# Patient Record
Sex: MALE | Race: ASIAN | NOT HISPANIC OR LATINO | Employment: OTHER | URBAN - METROPOLITAN AREA
[De-identification: names, ages, dates, MRNs, and addresses within clinical notes are randomized per-mention and may not be internally consistent; named-entity substitution may affect disease eponyms.]

---

## 2017-02-09 ENCOUNTER — APPOINTMENT (OUTPATIENT)
Dept: INTERVENTIONAL RADIOLOGY/VASCULAR | Facility: CLINIC | Age: 72
End: 2017-02-09
Attending: INTERNAL MEDICINE
Payer: MEDICARE

## 2017-02-09 ENCOUNTER — ANESTHESIA (OUTPATIENT)
Dept: SURGERY | Facility: CLINIC | Age: 72
End: 2017-02-09
Payer: MEDICARE

## 2017-02-09 ENCOUNTER — HOSPITAL ENCOUNTER (OUTPATIENT)
Facility: CLINIC | Age: 72
Setting detail: OBSERVATION
Discharge: HOME | End: 2017-02-10
Attending: EMERGENCY MEDICINE | Admitting: HOSPITALIST
Payer: MEDICARE

## 2017-02-09 ENCOUNTER — APPOINTMENT (OUTPATIENT)
Dept: ULTRASOUND IMAGING | Facility: CLINIC | Age: 72
End: 2017-02-09
Attending: EMERGENCY MEDICINE
Payer: MEDICARE

## 2017-02-09 ENCOUNTER — ANESTHESIA EVENT (OUTPATIENT)
Dept: SURGERY | Facility: CLINIC | Age: 72
End: 2017-02-09
Payer: MEDICARE

## 2017-02-09 DIAGNOSIS — K80.70 CHOLELITHIASIS WITH CHOLEDOCHOLITHIASIS: ICD-10-CM

## 2017-02-09 PROBLEM — K80.20 CHOLELITHIASIS: Status: ACTIVE | Noted: 2017-02-09

## 2017-02-09 LAB
ALBUMIN SERPL-MCNC: 3.6 G/DL (ref 3.4–5)
ALP SERPL-CCNC: 170 U/L (ref 40–150)
ALT SERPL W P-5'-P-CCNC: 1120 U/L (ref 0–70)
ANION GAP SERPL CALCULATED.3IONS-SCNC: 11 MMOL/L (ref 3–14)
AST SERPL W P-5'-P-CCNC: 993 U/L (ref 0–45)
BASOPHILS # BLD AUTO: 0 10E9/L (ref 0–0.2)
BASOPHILS NFR BLD AUTO: 0.4 %
BILIRUB SERPL-MCNC: 1.3 MG/DL (ref 0.2–1.3)
BUN SERPL-MCNC: 15 MG/DL (ref 7–30)
CALCIUM SERPL-MCNC: 8.3 MG/DL (ref 8.5–10.1)
CHLORIDE SERPL-SCNC: 102 MMOL/L (ref 94–109)
CO2 SERPL-SCNC: 24 MMOL/L (ref 20–32)
CREAT SERPL-MCNC: 0.7 MG/DL (ref 0.66–1.25)
DIFFERENTIAL METHOD BLD: NORMAL
EOSINOPHIL # BLD AUTO: 0.1 10E9/L (ref 0–0.7)
EOSINOPHIL NFR BLD AUTO: 1.8 %
ERCP: NORMAL
ERYTHROCYTE [DISTWIDTH] IN BLOOD BY AUTOMATED COUNT: 12.9 % (ref 10–15)
GFR SERPL CREATININE-BSD FRML MDRD: ABNORMAL ML/MIN/1.7M2
GLUCOSE BLDC GLUCOMTR-MCNC: 113 MG/DL (ref 70–99)
GLUCOSE BLDC GLUCOMTR-MCNC: 127 MG/DL (ref 70–99)
GLUCOSE SERPL-MCNC: 123 MG/DL (ref 70–99)
HCT VFR BLD AUTO: 42.5 % (ref 40–53)
HGB BLD-MCNC: 14.5 G/DL (ref 13.3–17.7)
IMM GRANULOCYTES # BLD: 0 10E9/L (ref 0–0.4)
IMM GRANULOCYTES NFR BLD: 0.4 %
INR PPP: 0.94 (ref 0.86–1.14)
LIPASE SERPL-CCNC: 206 U/L (ref 73–393)
LYMPHOCYTES # BLD AUTO: 1 10E9/L (ref 0.8–5.3)
LYMPHOCYTES NFR BLD AUTO: 19.9 %
MCH RBC QN AUTO: 30.1 PG (ref 26.5–33)
MCHC RBC AUTO-ENTMCNC: 34.1 G/DL (ref 31.5–36.5)
MCV RBC AUTO: 88 FL (ref 78–100)
MONOCYTES # BLD AUTO: 0.4 10E9/L (ref 0–1.3)
MONOCYTES NFR BLD AUTO: 8.6 %
NEUTROPHILS # BLD AUTO: 3.5 10E9/L (ref 1.6–8.3)
NEUTROPHILS NFR BLD AUTO: 68.9 %
NRBC # BLD AUTO: 0 10*3/UL
NRBC BLD AUTO-RTO: 0 /100
PLATELET # BLD AUTO: 250 10E9/L (ref 150–450)
POTASSIUM SERPL-SCNC: 3.9 MMOL/L (ref 3.4–5.3)
PROT SERPL-MCNC: 6.9 G/DL (ref 6.8–8.8)
RBC # BLD AUTO: 4.82 10E12/L (ref 4.4–5.9)
SODIUM SERPL-SCNC: 137 MMOL/L (ref 133–144)
TROPONIN I SERPL-MCNC: NORMAL UG/L (ref 0–0.04)
UPPER EUS: NORMAL
WBC # BLD AUTO: 5 10E9/L (ref 4–11)

## 2017-02-09 PROCEDURE — 27210995 ZZH RX 272: Performed by: INTERNAL MEDICINE

## 2017-02-09 PROCEDURE — 25000132 ZZH RX MED GY IP 250 OP 250 PS 637: Mod: GY | Performed by: PHYSICIAN ASSISTANT

## 2017-02-09 PROCEDURE — 25000128 H RX IP 250 OP 636: Performed by: PHYSICIAN ASSISTANT

## 2017-02-09 PROCEDURE — 40000306 ZZH STATISTIC PRE PROC ASSESS II: Performed by: INTERNAL MEDICINE

## 2017-02-09 PROCEDURE — C1726 CATH, BAL DIL, NON-VASCULAR: HCPCS | Performed by: INTERNAL MEDICINE

## 2017-02-09 PROCEDURE — 36000058 ZZH SURGERY LEVEL 3 EA 15 ADDTL MIN: Performed by: INTERNAL MEDICINE

## 2017-02-09 PROCEDURE — 25500064 ZZH RX 255 OP 636: Performed by: INTERNAL MEDICINE

## 2017-02-09 PROCEDURE — 93005 ELECTROCARDIOGRAM TRACING: CPT

## 2017-02-09 PROCEDURE — 83690 ASSAY OF LIPASE: CPT | Performed by: EMERGENCY MEDICINE

## 2017-02-09 PROCEDURE — 36000060 ZZH SURGERY LEVEL 3 W FLUORO 1ST 30 MIN: Performed by: INTERNAL MEDICINE

## 2017-02-09 PROCEDURE — 25000125 ZZHC RX 250: Performed by: EMERGENCY MEDICINE

## 2017-02-09 PROCEDURE — 36415 COLL VENOUS BLD VENIPUNCTURE: CPT | Performed by: EMERGENCY MEDICINE

## 2017-02-09 PROCEDURE — 25000125 ZZHC RX 250: Performed by: NURSE ANESTHETIST, CERTIFIED REGISTERED

## 2017-02-09 PROCEDURE — 37000009 ZZH ANESTHESIA TECHNICAL FEE, EACH ADDTL 15 MIN: Performed by: INTERNAL MEDICINE

## 2017-02-09 PROCEDURE — 99235 HOSP IP/OBS SAME DATE MOD 70: CPT | Performed by: PHYSICIAN ASSISTANT

## 2017-02-09 PROCEDURE — 40000935 ZZH STATISTIC OUTPATIENT (NON-OBS) EVE

## 2017-02-09 PROCEDURE — 40000063 ZZH STATISTIC EGD (OR PROCEDURE): Performed by: INTERNAL MEDICINE

## 2017-02-09 PROCEDURE — 80053 COMPREHEN METABOLIC PANEL: CPT | Performed by: EMERGENCY MEDICINE

## 2017-02-09 PROCEDURE — 84484 ASSAY OF TROPONIN QUANT: CPT | Performed by: EMERGENCY MEDICINE

## 2017-02-09 PROCEDURE — 85610 PROTHROMBIN TIME: CPT | Performed by: EMERGENCY MEDICINE

## 2017-02-09 PROCEDURE — G0378 HOSPITAL OBSERVATION PER HR: HCPCS

## 2017-02-09 PROCEDURE — 85025 COMPLETE CBC W/AUTO DIFF WBC: CPT | Performed by: EMERGENCY MEDICINE

## 2017-02-09 PROCEDURE — 76705 ECHO EXAM OF ABDOMEN: CPT

## 2017-02-09 PROCEDURE — 71000012 ZZH RECOVERY PHASE 1 LEVEL 1 FIRST HR: Performed by: INTERNAL MEDICINE

## 2017-02-09 PROCEDURE — 25000132 ZZH RX MED GY IP 250 OP 250 PS 637: Performed by: EMERGENCY MEDICINE

## 2017-02-09 PROCEDURE — 25000566 ZZH SEVOFLURANE, EA 15 MIN: Performed by: INTERNAL MEDICINE

## 2017-02-09 PROCEDURE — 27210794 ZZH OR GENERAL SUPPLY STERILE: Performed by: INTERNAL MEDICINE

## 2017-02-09 PROCEDURE — 25000128 H RX IP 250 OP 636: Performed by: NURSE ANESTHETIST, CERTIFIED REGISTERED

## 2017-02-09 PROCEDURE — C1769 GUIDE WIRE: HCPCS | Performed by: INTERNAL MEDICINE

## 2017-02-09 PROCEDURE — 25000125 ZZHC RX 250: Performed by: PHYSICIAN ASSISTANT

## 2017-02-09 PROCEDURE — 00000146 ZZHCL STATISTIC GLUCOSE BY METER IP

## 2017-02-09 PROCEDURE — 40000067 ZZH STATISTIC ERCP (OR PROCEDURE): Performed by: INTERNAL MEDICINE

## 2017-02-09 PROCEDURE — 37000008 ZZH ANESTHESIA TECHNICAL FEE, 1ST 30 MIN: Performed by: INTERNAL MEDICINE

## 2017-02-09 PROCEDURE — 25800025 ZZH RX 258: Performed by: ANESTHESIOLOGY

## 2017-02-09 PROCEDURE — 99285 EMERGENCY DEPT VISIT HI MDM: CPT | Mod: 25

## 2017-02-09 PROCEDURE — 40000799 ZZH STATISTIC EUS (OR PROCEDURE): Performed by: INTERNAL MEDICINE

## 2017-02-09 RX ORDER — ONDANSETRON 4 MG/1
4 TABLET, ORALLY DISINTEGRATING ORAL EVERY 30 MIN PRN
Status: DISCONTINUED | OUTPATIENT
Start: 2017-02-09 | End: 2017-02-09 | Stop reason: HOSPADM

## 2017-02-09 RX ORDER — LABETALOL HYDROCHLORIDE 5 MG/ML
10 INJECTION, SOLUTION INTRAVENOUS
Status: DISCONTINUED | OUTPATIENT
Start: 2017-02-09 | End: 2017-02-09 | Stop reason: HOSPADM

## 2017-02-09 RX ORDER — EPHEDRINE SULFATE 50 MG/ML
INJECTION, SOLUTION INTRAVENOUS PRN
Status: DISCONTINUED | OUTPATIENT
Start: 2017-02-09 | End: 2017-02-09

## 2017-02-09 RX ORDER — FENTANYL CITRATE 50 UG/ML
INJECTION, SOLUTION INTRAMUSCULAR; INTRAVENOUS PRN
Status: DISCONTINUED | OUTPATIENT
Start: 2017-02-09 | End: 2017-02-09

## 2017-02-09 RX ORDER — OXYCODONE HYDROCHLORIDE 5 MG/1
5-10 TABLET ORAL
Status: DISCONTINUED | OUTPATIENT
Start: 2017-02-09 | End: 2017-02-10 | Stop reason: HOSPADM

## 2017-02-09 RX ORDER — LIDOCAINE 40 MG/G
CREAM TOPICAL
Status: DISCONTINUED | OUTPATIENT
Start: 2017-02-09 | End: 2017-02-09 | Stop reason: HOSPADM

## 2017-02-09 RX ORDER — AMOXICILLIN 250 MG
1-2 CAPSULE ORAL 2 TIMES DAILY PRN
Status: DISCONTINUED | OUTPATIENT
Start: 2017-02-09 | End: 2017-02-10 | Stop reason: HOSPADM

## 2017-02-09 RX ORDER — PROPOFOL 10 MG/ML
INJECTION, EMULSION INTRAVENOUS PRN
Status: DISCONTINUED | OUTPATIENT
Start: 2017-02-09 | End: 2017-02-09

## 2017-02-09 RX ORDER — PROCHLORPERAZINE 25 MG
12.5 SUPPOSITORY, RECTAL RECTAL EVERY 12 HOURS PRN
Status: DISCONTINUED | OUTPATIENT
Start: 2017-02-09 | End: 2017-02-10 | Stop reason: HOSPADM

## 2017-02-09 RX ORDER — INDOMETHACIN 50 MG/1
100 SUPPOSITORY RECTAL
Status: COMPLETED | OUTPATIENT
Start: 2017-02-09 | End: 2017-02-09

## 2017-02-09 RX ORDER — HYDROMORPHONE HCL/0.9% NACL/PF 0.2MG/0.2
0.2 SYRINGE (ML) INTRAVENOUS
Status: DISCONTINUED | OUTPATIENT
Start: 2017-02-09 | End: 2017-02-10 | Stop reason: HOSPADM

## 2017-02-09 RX ORDER — PROCHLORPERAZINE MALEATE 5 MG
5 TABLET ORAL EVERY 6 HOURS PRN
Status: DISCONTINUED | OUTPATIENT
Start: 2017-02-09 | End: 2017-02-10 | Stop reason: HOSPADM

## 2017-02-09 RX ORDER — FENTANYL CITRATE 50 UG/ML
25-50 INJECTION, SOLUTION INTRAMUSCULAR; INTRAVENOUS
Status: DISCONTINUED | OUTPATIENT
Start: 2017-02-09 | End: 2017-02-09 | Stop reason: HOSPADM

## 2017-02-09 RX ORDER — ONDANSETRON 4 MG/1
4 TABLET, ORALLY DISINTEGRATING ORAL EVERY 6 HOURS PRN
Status: DISCONTINUED | OUTPATIENT
Start: 2017-02-09 | End: 2017-02-10 | Stop reason: HOSPADM

## 2017-02-09 RX ORDER — ONDANSETRON 2 MG/ML
4 INJECTION INTRAMUSCULAR; INTRAVENOUS EVERY 6 HOURS PRN
Status: DISCONTINUED | OUTPATIENT
Start: 2017-02-09 | End: 2017-02-10 | Stop reason: HOSPADM

## 2017-02-09 RX ORDER — SODIUM CHLORIDE, SODIUM LACTATE, POTASSIUM CHLORIDE, CALCIUM CHLORIDE 600; 310; 30; 20 MG/100ML; MG/100ML; MG/100ML; MG/100ML
INJECTION, SOLUTION INTRAVENOUS CONTINUOUS
Status: DISCONTINUED | OUTPATIENT
Start: 2017-02-09 | End: 2017-02-09 | Stop reason: HOSPADM

## 2017-02-09 RX ORDER — SODIUM CHLORIDE 9 MG/ML
INJECTION, SOLUTION INTRAVENOUS CONTINUOUS
Status: DISCONTINUED | OUTPATIENT
Start: 2017-02-09 | End: 2017-02-10 | Stop reason: HOSPADM

## 2017-02-09 RX ORDER — NALOXONE HYDROCHLORIDE 0.4 MG/ML
.1-.4 INJECTION, SOLUTION INTRAMUSCULAR; INTRAVENOUS; SUBCUTANEOUS
Status: DISCONTINUED | OUTPATIENT
Start: 2017-02-09 | End: 2017-02-10 | Stop reason: HOSPADM

## 2017-02-09 RX ORDER — HYDRALAZINE HYDROCHLORIDE 20 MG/ML
2.5-5 INJECTION INTRAMUSCULAR; INTRAVENOUS EVERY 10 MIN PRN
Status: DISCONTINUED | OUTPATIENT
Start: 2017-02-09 | End: 2017-02-09 | Stop reason: HOSPADM

## 2017-02-09 RX ORDER — CEFTAZIDIME 1 G/1
1 INJECTION, POWDER, FOR SOLUTION INTRAMUSCULAR; INTRAVENOUS
Status: COMPLETED | OUTPATIENT
Start: 2017-02-09 | End: 2017-02-09

## 2017-02-09 RX ORDER — GLYCOPYRROLATE 0.2 MG/ML
INJECTION, SOLUTION INTRAMUSCULAR; INTRAVENOUS PRN
Status: DISCONTINUED | OUTPATIENT
Start: 2017-02-09 | End: 2017-02-09

## 2017-02-09 RX ORDER — LIDOCAINE 40 MG/G
CREAM TOPICAL
Status: DISCONTINUED | OUTPATIENT
Start: 2017-02-09 | End: 2017-02-10 | Stop reason: HOSPADM

## 2017-02-09 RX ORDER — INDOMETHACIN 50 MG/1
100 SUPPOSITORY RECTAL
Status: DISCONTINUED | OUTPATIENT
Start: 2017-02-09 | End: 2017-02-09 | Stop reason: HOSPADM

## 2017-02-09 RX ORDER — NEOSTIGMINE METHYLSULFATE 1 MG/ML
VIAL (ML) INJECTION PRN
Status: DISCONTINUED | OUTPATIENT
Start: 2017-02-09 | End: 2017-02-09

## 2017-02-09 RX ORDER — ONDANSETRON 2 MG/ML
4 INJECTION INTRAMUSCULAR; INTRAVENOUS EVERY 30 MIN PRN
Status: DISCONTINUED | OUTPATIENT
Start: 2017-02-09 | End: 2017-02-09 | Stop reason: HOSPADM

## 2017-02-09 RX ORDER — MULTIVITAMIN,THERAPEUTIC
1 TABLET ORAL EVERY EVENING
COMMUNITY

## 2017-02-09 RX ORDER — DIMENHYDRINATE 50 MG/ML
25 INJECTION, SOLUTION INTRAMUSCULAR; INTRAVENOUS
Status: DISCONTINUED | OUTPATIENT
Start: 2017-02-09 | End: 2017-02-09 | Stop reason: HOSPADM

## 2017-02-09 RX ORDER — POLYETHYLENE GLYCOL 3350 17 G/17G
17 POWDER, FOR SOLUTION ORAL DAILY PRN
Status: DISCONTINUED | OUTPATIENT
Start: 2017-02-09 | End: 2017-02-10 | Stop reason: HOSPADM

## 2017-02-09 RX ORDER — HYDROMORPHONE HYDROCHLORIDE 1 MG/ML
.3-.5 INJECTION, SOLUTION INTRAMUSCULAR; INTRAVENOUS; SUBCUTANEOUS EVERY 5 MIN PRN
Status: DISCONTINUED | OUTPATIENT
Start: 2017-02-09 | End: 2017-02-09 | Stop reason: HOSPADM

## 2017-02-09 RX ORDER — DEXAMETHASONE SODIUM PHOSPHATE 4 MG/ML
INJECTION, SOLUTION INTRA-ARTICULAR; INTRALESIONAL; INTRAMUSCULAR; INTRAVENOUS; SOFT TISSUE PRN
Status: DISCONTINUED | OUTPATIENT
Start: 2017-02-09 | End: 2017-02-09

## 2017-02-09 RX ORDER — METFORMIN HCL 500 MG
1000 TABLET, EXTENDED RELEASE 24 HR ORAL
COMMUNITY

## 2017-02-09 RX ADMIN — MIDAZOLAM HYDROCHLORIDE 2 MG: 1 INJECTION, SOLUTION INTRAMUSCULAR; INTRAVENOUS at 16:00

## 2017-02-09 RX ADMIN — ONDANSETRON 4 MG: 2 INJECTION INTRAMUSCULAR; INTRAVENOUS at 16:08

## 2017-02-09 RX ADMIN — GLYCOPYRROLATE 0.4 MG: 0.2 INJECTION, SOLUTION INTRAMUSCULAR; INTRAVENOUS at 16:45

## 2017-02-09 RX ADMIN — CEFTAZIDIME 1 G: 1 INJECTION, POWDER, FOR SOLUTION INTRAMUSCULAR; INTRAVENOUS at 16:18

## 2017-02-09 RX ADMIN — GLYCOPYRROLATE 0.2 MG: 0.2 INJECTION, SOLUTION INTRAMUSCULAR; INTRAVENOUS at 16:08

## 2017-02-09 RX ADMIN — ROCURONIUM BROMIDE 35 MG: 10 INJECTION INTRAVENOUS at 16:08

## 2017-02-09 RX ADMIN — SODIUM CHLORIDE, POTASSIUM CHLORIDE, SODIUM LACTATE AND CALCIUM CHLORIDE: 600; 310; 30; 20 INJECTION, SOLUTION INTRAVENOUS at 16:00

## 2017-02-09 RX ADMIN — DEXAMETHASONE SODIUM PHOSPHATE 4 MG: 4 INJECTION, SOLUTION INTRAMUSCULAR; INTRAVENOUS at 16:08

## 2017-02-09 RX ADMIN — SODIUM CHLORIDE: 9 INJECTION, SOLUTION INTRAVENOUS at 18:54

## 2017-02-09 RX ADMIN — FENTANYL CITRATE 100 MCG: 50 INJECTION, SOLUTION INTRAMUSCULAR; INTRAVENOUS at 16:08

## 2017-02-09 RX ADMIN — PROPOFOL 200 MG: 10 INJECTION, EMULSION INTRAVENOUS at 16:08

## 2017-02-09 RX ADMIN — EPHEDRINE SULFATE 5 MG: 50 INJECTION, SOLUTION INTRAMUSCULAR; INTRAVENOUS; SUBCUTANEOUS at 16:28

## 2017-02-09 RX ADMIN — Medication 3.5 MG: at 16:45

## 2017-02-09 RX ADMIN — Medication 30 MG: at 16:08

## 2017-02-09 RX ADMIN — LIDOCAINE HYDROCHLORIDE 30 ML: 20 SOLUTION ORAL; TOPICAL at 10:19

## 2017-02-09 RX ADMIN — FENTANYL CITRATE 50 MCG: 50 INJECTION, SOLUTION INTRAMUSCULAR; INTRAVENOUS at 16:36

## 2017-02-09 ASSESSMENT — ENCOUNTER SYMPTOMS
ABDOMINAL PAIN: 1
FEVER: 0
BLOOD IN STOOL: 0
DYSRHYTHMIAS: 0
SHORTNESS OF BREATH: 0
STRIDOR: 0
SEIZURES: 0
NAUSEA: 0
DIARRHEA: 0
VOMITING: 0

## 2017-02-09 ASSESSMENT — COPD QUESTIONNAIRES: COPD: 0

## 2017-02-09 ASSESSMENT — LIFESTYLE VARIABLES: TOBACCO_USE: 0

## 2017-02-09 NOTE — ED NOTES
Pt presents c/o epigastric pain on and off since eating a large meal on 1/29. Pt states pain presents after eating. Pt is having pain at this time 7/10. Pt is A&O, ABC's intact.

## 2017-02-09 NOTE — CONSULTS
GASTROENTEROLOGY CONSULTATION      Krista Antonio  [address] [facility] [address]  71 year old male     Admission Date/Time: 2/9/2017  Primary Care Provider: [facility], [place]     We were asked to see the patient in consultation by Dr. Layne for evaluation of epigastric pain, elevated LFTs.    CC: epigastric pain     HPI:  Krista Antonio is a 71 year old male with past medical history significant for diabetes and hyperlipidemia presenting to the hospital with episodic postprandial epigastric abdominal pain starting 4 days ago. He reports eating a hamburger with a beer 4 days ago at a [employer] Bowl party and approximately an hour later developed sharp epigastric pain, nonradiating. The pain did improve but subsequently every time he at, the pain would recur and become more intense. He took an aspirin to see if this would help but no change. He denies any fevers, chills, heartburn, reflux, nausea, vomiting, or changes in his stools. Due to the pain, he presented to the hospital this morning. Labs show elevated transaminases with normal bilirubin. RUQ US shows gall bladder wall thickening, gall stones, and prominent common bile duct (normal for age).     , ALT 1120, Alk phos 170. lipase, WBC, and total bilirubin normal. He consumes alcohol rarely. He is a nonsmoker. He takes metformin for the last year, and Crestor for the past 5 years. He also takes CoQ10 supplement.      PAST MEDICAL HISTORY:  Diabetes  Hyperlipidemia     ROS: A comprehensive ten point review of systems was negative aside from those in mentioned in the HPI.       MEDICATIONS:   Metformin  Crestor     ALLERGIES:   Allergies   Allergen Reactions     Sulfa Drugs         SOCIAL HISTORY:  Social History   Substance Use Topics     Smoking status: Not on file     Smokeless tobacco: Not on file     Alcohol Use: Not on file        FAMILY HISTORY:  Denies family history of gall bladder disease or liver issues.      PHYSICAL  "EXAM:   /82 mmHg  Pulse 82  Temp(Src) 97.7  F (36.5  C) (Oral)  Resp 18  Ht 1.753 m (5' 9\")  Wt 78.472 kg (173 lb)  BMI 25.54 kg/m2  SpO2 97%     PHYSICAL EXAM:  General: alert, oriented, NAD  SKIN: no suspicious lesions, rashes, jaundice, or spider angiomas  HEAD: Normocephalic. No masses, lesions, tenderness or abnormalities  NECK: Neck supple. No adenopathy. Thyroid symmetric, normal size.  EYES: No scleral icterus  ENT: ENT exam normal, no neck nodes or sinus tenderness  RESPIRATORY: negative, Good diaphragmatic excursion. Lungs clear  CARDIOVASCULAR: negative, PMI normal. No lifts, heaves, or thrills. RRR. No murmurs, clicks gallops or rub  GASTROINTESTINAL: +BS, soft, minimal epigastric tenderness, ND, no HSM, no masses/guarding/rebound  JOINT/EXTREMITIES: extremities normal- no gross deformities noted, gait normal and normal muscle tone  NEURO: Reflexes grossly normal and symmetric. Sensation grossly WNL.  PSYCH: no abnormal anxiety/depression  LYMPH: No anterior cervical, posterior cervical, or supraclavicular adenopathy     LABS:  I reviewed the patient's new clinical lab test results.   Recent Labs   Lab Test  02/09/17 0927   WBC  5.0   HGB  14.5   MCV  88   PLT  250     Recent Labs   Lab Test  02/09/17 0927   NA  137   POTASSIUM  3.9   CHLORIDE  102   CO2  24   BUN  15   ANIONGAP  11   NANCI  8.3*     Recent Labs   Lab Test  02/09/17 0927   ALBUMIN  3.6   BILITOTAL  1.3   ALT  1120*   AST  993*   ALKPHOS  170*   LIPASE  206        IMAGING  I personally reviewed the patient's new imaging results.    RUQ US:  FINDINGS: Multiple small mobile echogenic foci are noted within the  gallbladder which may represent nonshadowing stones. The gallbladder  wall was mildly thickened measuring 0.4 cm. The common bile duct was  prominent but likely normal for age. Increased hepatic echotexture  suggests fatty infiltration. The right kidney and visualized portion  of the pancreas appear within normal " limits.                                                                       IMPRESSION: Multiple small gallstones. Nonspecific gallbladder wall  thickening. Probable hepatic fatty infiltration.     CONSULTATION ASSESSMENT AND PLAN:    71 year old male presenting with episodic postprandial epigastric pain starting 4 days ago with workup consistent with biliary colic and mild cholecystitis with concern for possible obstructing stone. Given how elevated transaminases are at this point, prefer EUS to exclude CBD stone. EUS, possible ERCP today. INR ordered. Continue NPO. Orders placed.       Thank you for asking us to participate in the care of this patient.    Annetta Evans PA-C  [facility]

## 2017-02-09 NOTE — ED PROVIDER NOTES
"Chief Complaint:  Abdominal pain   History of Present Illness   Krista Antonio is a 71 year old male with a history of hypertension, diabetes and hyperlipidemia who presents to the emergency department for evaluation of abdominal pain. The patient reports that four days ago shortly after eating dinner, he developed pain to his \"solar plexus\" region. He describes the pain as \"someone punching [him] in the stomach\".  It is non-radiating. He initially thought this was indigestion but states that since that time, he experiences similar epigastric pains 30 minutes after eating. He states that he had the most severe episode of pain yesterday after lunch that has lasted through the night and into this morning. He experiences some relief when lying down. He denies any radiation of pain and has taken aspirin without significant change. He has not experienced this pain while exercising. His daughter-in-law, a hospitalist, encouraged him to be evaluated for cardiac vs gallbladder pathology. He rates his current pain at 6-7/10. He denies any fever, nausea, vomiting, black/bloody stool, sore throat, shortness of breath or known trauma/injury. The patient voices no further concerns at this time.    Cardiac Risk Factors:  SEX:              male  Tobacco:              Negative  Hypertension:       Positive  Diabetes:             Positive  Lipids:                Positive  Personal history:  Negative  Family History:       Negative    Allergies:  Sulfa Drugs    Medications:  Baby aspirin    Medical History:  Diabetes  Hypertension   Hyperlipidemia    Surgical History:  Hip surgery    Family History:  History reviewed. No pertinent family history.    Social History:  The patient presents to the emergency department alone, brought in by daughter-in-law.   Tobacco use: No  Alcohol use: Yes  Marital Status:   PCP: No primary care provider on file.     Review of Systems   Constitutional: Negative for fever.   Respiratory: " "Negative for shortness of breath.    Gastrointestinal: Positive for abdominal pain. Negative for nausea, vomiting, diarrhea and blood in stool.   All other systems reviewed and are negative.    First Vitals:  Patient Vitals for the past 24 hrs:   BP Temp Temp src Pulse Heart Rate Resp SpO2 Height Weight   02/09/17 1030 - - - - 74 - 96 % - -   02/09/17 1000 143/87 mmHg - - - 83 - - - -   02/09/17 0850 151/82 mmHg 97.7  F (36.5  C) Oral 82 - 18 97 % 1.753 m (5' 9\") 78.472 kg (173 lb)     Physical Exam  Gen: Pleasant, appears stated age.  Minimal discomfort.    Eye:   Pupils are equal, round, and reactive.     Sclera non-injected.    ENT:   Moist mucus membranes.     Normal tongue.    Oropharynx without lesions.    Cardiac:     Normal rate, occasional PVC's.   No murmurs, gallops, or rubs.    Pulmonary:     Clear to auscultation bilaterally.    No wheezes, rales, or rhonchi.    Abdomen:     Normal active bowel sounds.     Abdomen is soft and non-distended.    Mild epigastric abdominal tenderness, negative Hendrickson's sign.    Musculoskeletal:     Normal movement of all extremities without evidence for deficit.    Extremities:    No edema.    Skin:   Warm and dry.    Neurologic:    Non-focal exam without asymmetric weakness or numbness.    Normal tone    Psychiatric:     Normal affect with appropriate interaction with examiner.     Emergency Department Course   Laboratory:  CBC: WNL (WBC - 5.0, HGB - 14.5, PLT - 250)  CMP: Glucose - 123 (H), Ca - 8.3 (L), Alkphos - 170 (H), ALT - 1120 (HH), AST - 993 (HH) o/w WNL (Creat - 0.70)  Lipase: 206  Troponin: <0.015    EKG:  Indication: epigastric pain  Time: 08:46  Vent. Rate 84 bpm. AR interval 176. QRS duration 88. QT/QTc 378/446. P-R-T axis 27 37 26. Sinus rhythm with premature atrial complexes with aberrant conduction. Otherwise normal ECG. Read time: 1000. Agree.     Imaging:  US Abdomen Limited: Multiple small mobile echogenic foci are noted within the  gallbladder which " may represent nonshadowing stones. The gallbladder  wall was mildly thickened measuring 0.4 cm. The common bile duct was  prominent but likely normal for age. Increased hepatic echotexture  suggests fatty infiltration. The right kidney and visualized portion  of the pancreas appear within normal limits. Report per radiology     Interventions:  1019 Oral GI Cocktail    Medications   lidocaine (XYLOCAINE) 2 % 15 mL, alum & mag hydroxide-simethicone (MYLANTA ES/MAALOX  ES) 15 mL GI Cocktail (30 mLs Oral Given 2/9/17 1019)       Emergency Department Course Summary:  I reviewed the patient's medical history, charts, vitals, and nursing notes. I examined the patient and discussed the plan of care.    EKG obtained in the ED, see results above.     Blood drawn. This was sent to the lab for further testing, results above.    The patient was sent for an abdominal ultrasound while in the emergency department, findings above.     1033 I discussed the patient with Dr. Roblero, CHANEL. He will try to arrange for EUS.    1036 I reevaluated the patient and provided an update in regards to his ED course.  He declines any pain medications.    11:42   I discussed the case with Dr. Aaron, hospitalist.    12:31 PM  Dr. Roblero confirmed that the patient is scheduled for an endoscopic ultrasound and possible ERCP.    Findings and plan explained to patient. I spoke with Dr. Roblero, from , regarding the patient's case. They agree to accept the patient for transfer and admission to an Adult med/surg bed for further monitoring, evaluation, and treatment. The patient was made aware of this plan, consented, and all questions were answered prior to transfer.    Medical Decision Making   Impression and Plan:  Krista Antonio is a 71 year old male with a history of diabetes, hypertension and hyperlipidemia who presents with intermittent, now constant, epigastric abdominal pain. On exam, the patient has a benign abdominal but mild epigastric  tenderness. He has normal vitals. Labs were notable to markedly elevated ALT and AST. Right upper quadrant ultrasound demonstrates a borderline normal common duct but multiple gallstones. I discussed the case with Dr. Roblero, who evaluated the patient in the ED. He is arranging for an endoscopic ultrasound and possibly ERCP. The patient has been updated and agrees with this plan. His pain is currently controlled and he will remain NPO while in the ED. He will be admitted to the observation unit pending the above interventions.     Diagnosis:    ICD-10-CM    1. Cholelithiasis with choledocholithiasis K80.70       Disposition:   Discharge    Sonia ALLEN, am serving as a scribe at 9:14 AM on 2/9/2017 to document services personally performed by Indu Layne MD, based on my observations and the provider's statements to me.     [facility] EMERGENCY DEPARTMENT  [employer]      Indu Layne MD  02/09/17 [id number]

## 2017-02-09 NOTE — PHARMACY-ADMISSION MEDICATION HISTORY
Admission medication history interview status for this patient is complete. See [facility] admission navigator for allergy information, prior to admission medications and immunization status.     Medication history interview source(s):Patient and Family  Medication history resources (including written lists, pill bottles, clinic record):None  Primary pharmacy:[facility]    Changes made to [facility] medication list:  Added: all  Deleted: none  Changed: none    Actions taken by pharmacist (provider contacted, etc):None     Additional medication history information:None    Medication reconciliation/reorder completed by provider prior to medication history? No    For patients on insulin therapy: No (Yes/No)    Time spent in this activity: 10 min    Prior to Admission medications    Medication Sig Last Dose Taking? Auth Provider   ASPIRIN EC PO Take 81 mg by mouth every evening  2/8/2017 at pm Yes Unknown, Entered By History   metFORMIN (GLUCOPHAGE-XR) 500 MG 24 hr tablet Take 1,000 mg by mouth daily (with dinner)  2/8/2017 at pm Yes Unknown, Entered By History   Rosuvastatin Calcium (CRESTOR PO) Take 10 mg by mouth every evening  2/8/2017 at pm Yes Unknown, Entered By History   Coenzyme Q10 (COQ10 PO) Take 1 tablet by mouth every evening  2/8/2017 at pm Yes Unknown, Entered By History   multivitamin, therapeutic (THERA-VIT) TABS tablet Take 1 tablet by mouth every evening  2/8/2017 at pm Yes Unknown, Entered By History

## 2017-02-09 NOTE — ANESTHESIA CARE TRANSFER NOTE
Patient: Krista Antonio    Procedure(s):  COMBINED ENDOSCOPIC ULTRASOUND, ESOPHAGOSCOPY, GASTROSCOPY, DUODENOSCOPY (EGD)  POSSIBLE ENDOSCOPIC RETROGRADE CHOLANGIOPANCREATOGRAM  - Wound Class: II-Clean Contaminated   - Wound Class: II-Clean Contaminated    Diagnosis: unknown  Diagnosis Additional Information: No value filed.    Anesthesia Type:   General, ETT     Note:  Airway :Face Mask  Patient transferred to:PACU  Comments: To PACU, adequate gas exchange, report to RN.      Vitals: (Last set prior to Anesthesia Care Transfer)    CRNA VITALS  2/9/2017 1625 - 2/9/2017 1704      2/9/2017             Pulse: 78    SpO2: 99 %    Resp Rate (observed): (!) 5                Electronically Signed By: REANNA Berrios CRNA  February 9, 2017  5:04 PM

## 2017-02-09 NOTE — ANESTHESIA POSTPROCEDURE EVALUATION
Patient: Krista Antonio    Procedure(s):  COMBINED ENDOSCOPIC ULTRASOUND, ESOPHAGOSCOPY, GASTROSCOPY, DUODENOSCOPY (EGD)  POSSIBLE ENDOSCOPIC RETROGRADE CHOLANGIOPANCREATOGRAM  - Wound Class: II-Clean Contaminated   - Wound Class: II-Clean Contaminated    Diagnosis:unknown  Diagnosis Additional Information: choledocholithiasis    Anesthesia Type:  General, ETT    Note:  Anesthesia Post Evaluation    Patient location during evaluation: PACU  Patient participation: Able to fully participate in evaluation  Level of consciousness: awake  Pain management: adequate  Airway patency: patent  Cardiovascular status: acceptable  Respiratory status: acceptable  Hydration status: acceptable  PONV: controlled     Anesthetic complications: None          Last vitals:  Filed Vitals:    02/09/17 1700 02/09/17 1705 02/09/17 1710   BP: 165/116  145/78   Pulse:      Temp: 97.2  F (36.2  C)     Resp: 18 11 10   SpO2: 100% 100% 99%         Electronically Signed By: David Coleman MD  February 9, 2017  5:28 PM

## 2017-02-09 NOTE — H&P
History and Physical     Krista Antonio MRN# [id number]   YOB: 1945 Age: 71 year old      Date of Admission:  2/9/2017    Primary care provider: [facility], [place]          Assessment and Plan:   Krista Antonio is a 71 year old male with a PMH significant for diabetes, HLD, who presents with postprandial epigastric pain.    1. Epigastric pain in setting of cholelithiasis: Seen in ED by GI, episodic postprandial epigastric pain and ED workup c/w biliary colic and mild cholecystitis, possible obstructing stone.  Marked elevated transaminases and RUQ u/s demonstrating mildly thickened gallbladder with multiple stones.  -NPO w/ IVF  -Will place formal GI consult to follow during admission  -EUS to exclude CBD stone.   -Likely ERCP today following EUS.  -Likely cholecystectomy tomorrow.  2. Diabetes mellitus: Will hold metformin 1000 mg today while NPO. Will have on SSI.  3. HLD: Hold rosuvastatin 10 mg while NPO, to resume following procedures.    Prophylaxis - PCDs, encourage ambulation every shift.  Code status - Full Code.  Dispo - Anticipate < 2 evening stay.              Chief Complaint:   Abdominal pain         History of Present Illness:   Krista Antonio is a 71 year old male with PMH significant for diabetes, HLD presenting to the hospital with episodic postprandial epigastric abdominal pain.  His episodes started 4 days ago. He reported eating a hamburger and drinking a beer 4 days ago at a [employer] party; an hour later developed sharp epigastric pain, nonradiating. His pain improved on its own, but every time he ate something the pain would recur and intensify.  He took an aspirin yesterday to see if this would help but no change.  His pain recurred this AM after drinking water, so he presented to the hospital for evaluation.    He denies any fevers, chills, heartburn, reflux, nausea, vomiting, or changes in his stools. Due to the pain, he presented to the hospital this  morning.  On arrival to ED, VSS. Labs show elevated transaminases (ALT 1120, ), alk phos 170, with normal bilirubin on CMP. Troponin < 0.015. CBC w/ diff unremarkable.  INR 0.94.  RUQ US shows gallbladder wall thickening 0.4 cm, gallstones, and prominent common bile duct.  GI was contacted and evaluated the patient in the ED.  He has been placed NPO and plan for EUS and likely ERCP today.    The patient denies fever, chills, diaphoresis, change in weight, lightheadedness, syncope, sore throat, nasal congestion, sinus pressure, cough, wheezing, shortness of breath, chest pain, palpitations, nausea, vomiting, reflux, or diarrhea.  He has no history of CAD, MI, DVT/PE, CVA, or pulmonary issues.  He exercises daily with no chest pain or sob.    History was obtained by speaking with the Emergency Room physician, chart review and interview with the patient.             Past Medical History:   History reviewed. No pertinent past medical history.         Past Surgical History:   History reviewed. No pertinent past surgical history.         Social History:     Social History     Social History     Marital Status:      Spouse Name: N/A     Number of Children: N/A     Years of Education: N/A     Occupational History     Not on file.     Social History Main Topics     Smoking status: Never Smoker      Smokeless tobacco: Not on file     Alcohol Use: Not on file     Drug Use: Not on file     Sexual Activity: Not on file     Other Topics Concern     Not on file     Social History Narrative     No narrative on file          Family History:   History reviewed. No pertinent family history.          Allergies:      Allergies   Allergen Reactions     Sulfa Drugs           Medications:     Prior to Admission medications    Medication Sig Last Dose Taking? Auth Provider   ASPIRIN EC PO Take 81 mg by mouth every evening  2/8/2017 at pm Yes Unknown, Entered By History   metFORMIN (GLUCOPHAGE-XR) 500 MG 24 hr tablet Take 1,000  "mg by mouth daily (with dinner)  2/8/2017 at pm Yes Unknown, Entered By History   Rosuvastatin Calcium (CRESTOR PO) Take 10 mg by mouth every evening  2/8/2017 at pm Yes Unknown, Entered By History   Coenzyme Q10 (COQ10 PO) Take 1 tablet by mouth every evening  2/8/2017 at pm Yes Unknown, Entered By History   multivitamin, therapeutic (THERA-VIT) TABS tablet Take 1 tablet by mouth every evening  2/8/2017 at pm Yes Unknown, Entered By History          Review of Systems:   A Comprehensive greater than 10 system review of systems was carried out.  Pertinent positives and negatives are noted above.  Otherwise negative for contributory information.          Physical Exam:   Blood pressure 147/90, pulse 82, temperature 97.7  F (36.5  C), temperature source Oral, resp. rate 16, height 1.753 m (5' 9\"), weight 78.472 kg (173 lb), SpO2 95 %.  Exam:  GENERAL: Comfortable.  PSYCH: Pleasant, oriented, no acute distress.  HEENT:  PERRLA. Normal conjunctiva, hearing intact. Normal nasal mucosa. Oropharynx pink and moist.  NECK:  Supple, no neck vein distention, adenopathy or bruits, normal thyroid.  HEART:  Normal S1, S2 w/ occasional extra beats, no pericardial rub, gallops or S3 or S4.  LUNGS:  Clear to auscultation, normal respiratory effort. No wheezing, rales, or ronchi.  ABDOMEN:  Soft, no hepatosplenomegaly, normal bowel sounds. Non-tender, non distended.    EXTREMITIES:  No pedal edema, +2 pulses bilateral and equal.  SKIN:  Dry to touch. No rash, wound or ulcerations.  NEUROLOGIC:  CN 2-12 intact, BL 5/5 symmetric upper and lower extremity strength, sensation is intact with no focal deficits.           Data:     Results for orders placed or performed during the hospital encounter of 02/09/17   US Abdomen Limited    Narrative    ULTRASOUND ABDOMEN LIMITED 2/9/2017 10:05 AM     HISTORY: Right upper quadrant pain.    FINDINGS: Multiple small mobile echogenic foci are noted within the  gallbladder which may represent " nonshadowing stones. The gallbladder  wall was mildly thickened measuring 0.4 cm. The common bile duct was  prominent but likely normal for age. Increased hepatic echotexture  suggests fatty infiltration. The right kidney and visualized portion  of the pancreas appear within normal limits.      Impression    IMPRESSION: Multiple small gallstones. Nonspecific gallbladder wall  thickening. Probable hepatic fatty infiltration.    SAIDA DAS MD   CBC with platelets differential   Result Value Ref Range    WBC 5.0 4.0 - 11.0 10e9/L    RBC Count 4.82 4.4 - 5.9 10e12/L    Hemoglobin 14.5 13.3 - 17.7 g/dL    Hematocrit 42.5 40.0 - 53.0 %    MCV 88 78 - 100 fl    MCH 30.1 26.5 - 33.0 pg    MCHC 34.1 31.5 - 36.5 g/dL    RDW 12.9 10.0 - 15.0 %    Platelet Count 250 150 - 450 10e9/L    Diff Method Automated Method     % Neutrophils 68.9 %    % Lymphocytes 19.9 %    % Monocytes 8.6 %    % Eosinophils 1.8 %    % Basophils 0.4 %    % Immature Granulocytes 0.4 %    Nucleated RBCs 0 0 /100    Absolute Neutrophil 3.5 1.6 - 8.3 10e9/L    Absolute Lymphocytes 1.0 0.8 - 5.3 10e9/L    Absolute Monocytes 0.4 0.0 - 1.3 10e9/L    Absolute Eosinophils 0.1 0.0 - 0.7 10e9/L    Absolute Basophils 0.0 0.0 - 0.2 10e9/L    Abs Immature Granulocytes 0.0 0 - 0.4 10e9/L    Absolute Nucleated RBC 0.0    Comprehensive metabolic panel   Result Value Ref Range    Sodium 137 133 - 144 mmol/L    Potassium 3.9 3.4 - 5.3 mmol/L    Chloride 102 94 - 109 mmol/L    Carbon Dioxide 24 20 - 32 mmol/L    Anion Gap 11 3 - 14 mmol/L    Glucose 123 (H) 70 - 99 mg/dL    Urea Nitrogen 15 7 - 30 mg/dL    Creatinine 0.70 0.66 - 1.25 mg/dL    GFR Estimate >90  Non  GFR Calc   >60 mL/min/1.7m2    GFR Estimate If Black >90   GFR Calc   >60 mL/min/1.7m2    Calcium 8.3 (L) 8.5 - 10.1 mg/dL    Bilirubin Total 1.3 0.2 - 1.3 mg/dL    Albumin 3.6 3.4 - 5.0 g/dL    Protein Total 6.9 6.8 - 8.8 g/dL    Alkaline Phosphatase 170 (H) 40 - 150 U/L     ALT 1120 (HH) 0 - 70 U/L     (HH) 0 - 45 U/L   Lipase   Result Value Ref Range    Lipase 206 73 - 393 U/L   Troponin I   Result Value Ref Range    Troponin I ES  0.000 - 0.045 ug/L     <0.015  The 99th percentile for upper reference range is 0.045 ug/L.  Troponin values in   the range of 0.045 - 0.120 ug/L may be associated with risks of adverse   clinical events.     INR   Result Value Ref Range    INR 0.94 0.86 - 1.14       Jenn Rausch PA-C

## 2017-02-09 NOTE — IP AVS SNAPSHOT
MRN:[id number]                      After Visit Summary   2/9/2017    Krista Antonio    MRN: [id number]           Thank you!     Thank you for choosing [facility] for your care. Our goal is always to provide you with excellent care. Hearing back from our patients is one way we can continue to improve our services. Please take a few minutes to complete the written survey that you may receive in the mail after you visit. If you would like to speak to someone directly about your visit please contact Patient Relations at [phone]. Thank you!          Patient Information     Date Of Birth          1945        About your hospital stay     You were admitted on:  February 9, 2017 You last received care in the:  [facility]    You were discharged on:  February 10, 2017        Reason for your hospital stay       You were evaluate in the hospital for upper abdominal pain related to gallstones passing through your biliary tract.  You had an endoscopic ultrasound and an ERCP to evaluate the biliary tract system, and they found evidence of sludge, likely recently passed stone.  Today you had surgery to remove your gallbladder so that you will not have symptoms like this in the future.  You should follow up with the general surgery team in clinic per their recommendations that they will give you on discharge post-operatively.                  Who to Call     For medical emergencies, please call [phone].  For non-urgent questions about your medical care, please call your primary care provider or clinic, [phone]  For questions related to your surgery, please call your surgery clinic        Attending Provider     Provider    Indu Layne MD Young, Jean Tsai, MD       Primary Care Provider Office Phone # Fax #    [facility] [phone] [address] [place] [address]         When to contact your care team        Call your primary doctor if you have any of the following: temperature greater than 101, increased shortness of breath, increased drainage or increased pain.                  After Care Instructions     Activity       Your activity upon discharge: activity as tolerated            Diet       Follow this diet upon discharge: Moderate CHO diet for diabetes                  Follow-up Appointments     Follow-up and recommended labs and tests        Follow up with primary care provider, [facility], within 7 days for hospital follow- up.  No follow up labs or test are needed.    Follow up with Dr. Kemp in clinic per his recommendations for post-operative evaluation.                  Further instructions from your care team           HOME CARE FOLLOWING LAPAROSCOPIC CHOLECYSTECTOMY  Anh Kemp, JIMMIE Chan, GLORIA Hyman. BUZZ Rasheed &  SVEN Ballard      INCISIONAL CARE:  Replace the bandage over your incisions until all drainage stops, or if more comfortable to have in place.  If present, leave the steri-strips (white paper tapes) in place until they fall off.  If Dermabond (a type of skin glue) is present, leave in place until it wears/flakes off.     BATHING:  Avoid baths for 1 week after surgery.  Showers are okay.  You may wash your hair at any time.  Gently pat your incisions dry after bathing.    ACTIVITY:  Light Activity -- you may immediately be up and about as tolerated.  Driving -- you may drive when comfortable and off narcotic pain medications.  Light Work -- resume when comfortable off pain medications.  (If you can drive, you probably can work.)  Strenuous Work/Activity -- limit lifting to 20 pounds for 1 week.  Progressively increase with time.  Active Sports (running, biking, etc.) -- cautiously resume after 2 weeks.    DISCOMFORT:  Use pain medications as prescribed by your surgeon.  Take the pain medication with some food, when possible, to minimize side effects.   Intermittent use of ice packs at the incision sites may help during the first 48 hours.  Expect gradual improvement.  You may experience shoulder pain, which is due to the air placed within your abdomen during the procedure.  This is temporary and usually passes within 2 days.    DIET:  Drink plenty of fluids.  While taking pain medications, increase dietary fiber or add a fiber supplementation like Metamucil or Citrucel to help prevent constipation - a possible side effect of pain medications.  If taking Metamucil or Citrucel, take with plenty of fluids as instructed.  It is not uncommon to experience some bowel changes (loose stools or constipation) after surgery.  Your body has to adapt to you no longer having a gall bladder.  To help minimize this side effect, avoid fatty foods for the first week after surgery.  You may then slowly increase the amount of fatty foods in your diet.      NAUSEA:  If nauseated from the anesthetic/pain meds; rest in bed, get up cautiously with assistance, and drink clear liquids (juice, tea, broth).    RETURN APPOINTMENT:  Schedule a follow-up visit 1-3 weeks post-op (you may do this any time after surgery is scheduled).  Office Phone:  [phone]      CONTACT US IF THE FOLLOWING DEVELOPS:  1.  A fever that is above 101    2.  If there is a large amount of drainage, bleeding, or swelling.  3.  Severe pain that is not relieved by your prescription.  4.  Drainage that is thick, cloudy, yellow, green or white.  5.  Any other questions not answered by  Frequently Asked Questions  sheet.         GENERAL ANESTHESIA OR SEDATION ADULT DISCHARGE INSTRUCTIONS   SPECIAL PRECAUTIONS FOR 24 HOURS AFTER SURGERY    IT IS NOT UNUSUAL TO FEEL LIGHT-HEADED OR FAINT, UP TO 24 HOURS AFTER SURGERY OR WHILE TAKING PAIN MEDICATION.  IF YOU HAVE THESE SYMPTOMS; SIT FOR A FEW MINUTES BEFORE STANDING AND HAVE SOMEONE ASSIST YOU WHEN YOU GET UP TO WALK OR USE THE BATHROOM.    YOU SHOULD REST AND RELAX FOR  "THE NEXT 24 HOURS AND YOU MUST MAKE ARRANGEMENTS TO HAVE SOMEONE STAY WITH YOU FOR AT LEAST 24 HOURS AFTER YOUR DISCHARGE.  AVOID HAZARDOUS AND STRENUOUS ACTIVITIES.  DO NOT MAKE IMPORTANT DECISIONS FOR 24 HOURS.    DO NOT DRIVE ANY VEHICLE OR OPERATE MECHANICAL EQUIPMENT FOR 24 HOURS FOLLOWING THE END OF YOUR SURGERY.  EVEN THOUGH YOU MAY FEEL NORMAL, YOUR REACTIONS MAY BE AFFECTED BY THE MEDICATION YOU HAVE RECEIVED.    DO NOT DRINK ALCOHOLIC BEVERAGES FOR 24 HOURS FOLLOWING YOUR SURGERY.    DRINK CLEAR LIQUIDS (APPLE JUICE, GINGER ALE, 7-UP, BROTH, ETC.).  PROGRESS TO YOUR REGULAR DIET AS YOU FEEL ABLE.    YOU MAY HAVE A DRY MOUTH, A SORE THROAT, MUSCLES ACHES OR TROUBLE SLEEPING.  THESE SHOULD GO AWAY AFTER 24 HOURS.    CALL YOUR DOCTOR FOR ANY OF THE FOLLOWING:  SIGNS OF INFECTION (FEVER, GROWING TENDERNESS AT THE SURGERY SITE, A LARGE AMOUNT OF DRAINAGE OR BLEEDING, SEVERE PAIN, FOUL-SMELLING DRAINAGE, REDNESS OR SWELLING.    IT HAS BEEN OVER 8 TO 10 HOURS SINCE SURGERY AND YOU ARE STILL NOT ABLE TO URINATE (PASS WATER).         Medications Given:  **You received Toradol, an IV form of ibuprofen (Motrin) at 3:00  PM.  Do not take any ibuprofen products until 9:00 PM.          Pending Results     Date and Time Order Name Status Description    2/10/2017 1316 Surgical pathology exam In process             Admission Information        Provider Department Dept Phone    2/9/2017 Greyson Aaron MD  [facility] [phone]      Your Vitals Were     Blood Pressure Pulse Temperature    163/94 mmHg 71 97.3  F (36.3  C) (Temporal)    Respirations Height Weight    12 1.753 m (5' 9\") 78.019 kg (172 lb)    BMI (Body Mass Index) Pulse Oximetry       25.39 kg/m2 97%       MyChart Information     [employer] lets you send messages to your doctor, view your test results, renew your prescriptions, schedule appointments and more. To sign up, go to [email or web address] . Click on \"Log in\" on the left side of the screen, which will " "take you to the Welcome page. Then click on \"Sign up Now\" on the right side of the page.     You will be asked to enter the access code listed below, as well as some personal information. Please follow the directions to create your username and password.     Your access code is: [email or web address]  Expires: 2017  2:06 PM     Your access code will  in 90 days. If you need help or a new code, please call your [facility] or [phone].        Care EveryWhere ID     This is your Care EveryWhere ID. This could be used by other organizations to access your [facility] medical records  [id number]           Review of your medicines      START taking        Dose / Directions    oxyCODONE-acetaminophen 5-325 MG per tablet   Commonly known as:  PERCOCET   Used for:  Cholelithiasis with choledocholithiasis        Dose:  1-2 tablet   Take 1-2 tablets by mouth every 4 hours as needed for pain (moderate to severe)   Quantity:  25 tablet   Refills:  0         CONTINUE these medicines which have NOT CHANGED        Dose / Directions    ASPIRIN EC PO        Dose:  81 mg   Take 81 mg by mouth every evening   Refills:  0       COQ10 PO        Dose:  1 tablet   Take 1 tablet by mouth every evening   Refills:  0       CRESTOR PO        Dose:  10 mg   Take 10 mg by mouth every evening   Refills:  0       metFORMIN 500 MG 24 hr tablet   Commonly known as:  GLUCOPHAGE-XR        Dose:  1000 mg   Take 1,000 mg by mouth daily (with dinner)   Refills:  0       multivitamin, therapeutic Tabs tablet        Dose:  1 tablet   Take 1 tablet by mouth every evening   Refills:  0            Where to get your medicines      Some of these will need a paper prescription and others can be bought over the counter. Ask your nurse if you have questions.     Bring a paper prescription for each of these medications    - oxyCODONE-acetaminophen 5-325 MG per tablet             Protect others around you: Learn how to safely use, store and throw away your " medicines at www.disposemymeds.org.             Medication List: This is a list of all your medications and when to take them. Check marks below indicate your daily home schedule. Keep this list as a reference.      Medications           Morning Afternoon Evening Bedtime As Needed    ASPIRIN EC PO   Take 81 mg by mouth every evening                                COQ10 PO   Take 1 tablet by mouth every evening                                CRESTOR PO   Take 10 mg by mouth every evening                                metFORMIN 500 MG 24 hr tablet   Commonly known as:  GLUCOPHAGE-XR   Take 1,000 mg by mouth daily (with dinner)                                multivitamin, therapeutic Tabs tablet   Take 1 tablet by mouth every evening                                oxyCODONE-acetaminophen 5-325 MG per tablet   Commonly known as:  PERCOCET   Take 1-2 tablets by mouth every 4 hours as needed for pain (moderate to severe)

## 2017-02-09 NOTE — ANESTHESIA PREPROCEDURE EVALUATION
Anesthesia Evaluation     . Pt has had prior anesthetic. Type: Regional    No history of anesthetic complications     ROS/MED HX    ENT/Pulmonary:  - neg pulmonary ROS    (-) tobacco use, asthma, COPD, JARON risk factors and recent URI   Neurologic:  - neg neurologic ROS    (-) seizures and CVA   Cardiovascular:     (+) Dyslipidemia, ----. : . . . :. .      (-) hypertension, CAD, arrhythmias and valvular problems/murmurs   METS/Exercise Tolerance:     Hematologic: Comments: Lab Test        02/09/17 0927          WBC          5.0           HGB          14.5          MCV          88            PLT          250           INR          0.94           Lab Test        02/09/17 0927          NA           137           POTASSIUM    3.9           CHLORIDE     102           CO2          24            BUN          15            CR           0.70          ANIONGAP     11            NANCI          8.3*          GLC          123*           - neg hematologic  ROS       Musculoskeletal:  - neg musculoskeletal ROS       GI/Hepatic:  - neg GI/hepatic ROS      (-) GERD, hepatitis and liver disease   Renal/Genitourinary:  - ROS Renal section negative       Endo:     (+) type II DM Not using insulin - not using insulin pump .   (-) Type I DM, thyroid disease, chronic steroid usage and obesity   Psychiatric:  - neg psychiatric ROS       Infectious Disease:  - neg infectious disease ROS       Malignancy:      - no malignancy   Other:    - neg other ROS           Physical Exam  Normal systems: cardiovascular, pulmonary and dental    Airway   Mallampati: I  TM distance: >3 FB  Neck ROM: full    Dental     Cardiovascular   Rhythm and rate: regular and normal  (-) no friction rub, no systolic click and no murmur    Pulmonary    breath sounds clear to auscultation(-) no rhonchi, no decreased breath sounds, no wheezes, no rales and no stridor                    Anesthesia Plan      History & Physical  Review  History and physical reviewed and following examination; no interval change.    ASA Status:  2 .    NPO Status:  > 8 hours    Plan for General and ETT with Intravenous induction.   PONV prophylaxis:  Ondansetron (or other 5HT-3) and Dexamethasone or Solumedrol    Surgeon will do ERCP now.  Patient agrees to GA.      Postoperative Care  Postoperative pain management:  IV analgesics.      Consents  Anesthetic plan, risks, benefits and alternatives discussed with:  Patient..                          .

## 2017-02-09 NOTE — IP AVS SNAPSHOT
[facility]    [address]    [facility] [address]    Phone:  [phone]    Fax:  [phone]                                       After Visit Summary   2/9/2017    Krista Antonio    MRN: [id number]           After Visit Summary Signature Page     I have received my discharge instructions, and my questions have been answered. I have discussed any challenges I see with this plan with the nurse or doctor.    ..........................................................................................................................................  Patient/Patient Representative Signature      ..........................................................................................................................................  Patient Representative Print Name and Relationship to Patient    ..................................................               ................................................  Date                                            Time    ..........................................................................................................................................  Reviewed by Signature/Title    ...................................................              ..............................................  Date                                                            Time

## 2017-02-10 ENCOUNTER — ANESTHESIA (OUTPATIENT)
Dept: SURGERY | Facility: CLINIC | Age: 72
End: 2017-02-10
Payer: MEDICARE

## 2017-02-10 ENCOUNTER — ANESTHESIA EVENT (OUTPATIENT)
Dept: SURGERY | Facility: CLINIC | Age: 72
End: 2017-02-10
Payer: MEDICARE

## 2017-02-10 VITALS
BODY MASS INDEX: 25.48 KG/M2 | HEIGHT: 69 IN | TEMPERATURE: 98.3 F | RESPIRATION RATE: 15 BRPM | OXYGEN SATURATION: 96 % | DIASTOLIC BLOOD PRESSURE: 82 MMHG | SYSTOLIC BLOOD PRESSURE: 143 MMHG | WEIGHT: 172 LBS | HEART RATE: 71 BPM

## 2017-02-10 LAB
ALBUMIN SERPL-MCNC: 3.5 G/DL (ref 3.4–5)
ALP SERPL-CCNC: 172 U/L (ref 40–150)
ALT SERPL W P-5'-P-CCNC: 793 U/L (ref 0–70)
ANION GAP SERPL CALCULATED.3IONS-SCNC: 12 MMOL/L (ref 3–14)
AST SERPL W P-5'-P-CCNC: 387 U/L (ref 0–45)
BASOPHILS # BLD AUTO: 0 10E9/L (ref 0–0.2)
BASOPHILS NFR BLD AUTO: 0.3 %
BILIRUB DIRECT SERPL-MCNC: 0.2 MG/DL (ref 0–0.2)
BILIRUB SERPL-MCNC: 0.8 MG/DL (ref 0.2–1.3)
BUN SERPL-MCNC: 13 MG/DL (ref 7–30)
CALCIUM SERPL-MCNC: 8.2 MG/DL (ref 8.5–10.1)
CHLORIDE SERPL-SCNC: 104 MMOL/L (ref 94–109)
CO2 SERPL-SCNC: 23 MMOL/L (ref 20–32)
CREAT SERPL-MCNC: 0.71 MG/DL (ref 0.66–1.25)
DIFFERENTIAL METHOD BLD: NORMAL
EOSINOPHIL # BLD AUTO: 0 10E9/L (ref 0–0.7)
EOSINOPHIL NFR BLD AUTO: 0 %
ERYTHROCYTE [DISTWIDTH] IN BLOOD BY AUTOMATED COUNT: 13.1 % (ref 10–15)
GFR SERPL CREATININE-BSD FRML MDRD: >90 ML/MIN/1.7M2
GLUCOSE SERPL-MCNC: 121 MG/DL (ref 70–99)
HCT VFR BLD AUTO: 42.4 % (ref 40–53)
HGB BLD-MCNC: 14.3 G/DL (ref 13.3–17.7)
IMM GRANULOCYTES # BLD: 0 10E9/L (ref 0–0.4)
IMM GRANULOCYTES NFR BLD: 0.4 %
LIPASE SERPL-CCNC: 75 U/L (ref 73–393)
LYMPHOCYTES # BLD AUTO: 0.8 10E9/L (ref 0.8–5.3)
LYMPHOCYTES NFR BLD AUTO: 11 %
MCH RBC QN AUTO: 29.9 PG (ref 26.5–33)
MCHC RBC AUTO-ENTMCNC: 33.7 G/DL (ref 31.5–36.5)
MCV RBC AUTO: 89 FL (ref 78–100)
MONOCYTES # BLD AUTO: 0.5 10E9/L (ref 0–1.3)
MONOCYTES NFR BLD AUTO: 6.5 %
NEUTROPHILS # BLD AUTO: 6.2 10E9/L (ref 1.6–8.3)
NEUTROPHILS NFR BLD AUTO: 81.8 %
NRBC # BLD AUTO: 0 10*3/UL
NRBC BLD AUTO-RTO: 0 /100
PLATELET # BLD AUTO: 267 10E9/L (ref 150–450)
POTASSIUM SERPL-SCNC: 4.2 MMOL/L (ref 3.4–5.3)
PROT SERPL-MCNC: 6.8 G/DL (ref 6.8–8.8)
RBC # BLD AUTO: 4.78 10E12/L (ref 4.4–5.9)
SODIUM SERPL-SCNC: 139 MMOL/L (ref 133–144)
WBC # BLD AUTO: 7.5 10E9/L (ref 4–11)

## 2017-02-10 PROCEDURE — 25800025 ZZH RX 258: Performed by: NURSE ANESTHETIST, CERTIFIED REGISTERED

## 2017-02-10 PROCEDURE — 25000566 ZZH SEVOFLURANE, EA 15 MIN: Performed by: SURGERY

## 2017-02-10 PROCEDURE — 25000132 ZZH RX MED GY IP 250 OP 250 PS 637: Mod: GY | Performed by: ANESTHESIOLOGY

## 2017-02-10 PROCEDURE — A9270 NON-COVERED ITEM OR SERVICE: HCPCS | Mod: GY | Performed by: ANESTHESIOLOGY

## 2017-02-10 PROCEDURE — 25000128 H RX IP 250 OP 636: Performed by: PHYSICIAN ASSISTANT

## 2017-02-10 PROCEDURE — 37000009 ZZH ANESTHESIA TECHNICAL FEE, EACH ADDTL 15 MIN: Performed by: SURGERY

## 2017-02-10 PROCEDURE — 88304 TISSUE EXAM BY PATHOLOGIST: CPT | Mod: 26 | Performed by: SURGERY

## 2017-02-10 PROCEDURE — 85025 COMPLETE CBC W/AUTO DIFF WBC: CPT | Performed by: PHYSICIAN ASSISTANT

## 2017-02-10 PROCEDURE — 71000027 ZZH RECOVERY PHASE 2 EACH 15 MINS: Performed by: SURGERY

## 2017-02-10 PROCEDURE — 36000056 ZZH SURGERY LEVEL 3 1ST 30 MIN: Performed by: SURGERY

## 2017-02-10 PROCEDURE — 40000306 ZZH STATISTIC PRE PROC ASSESS II: Performed by: SURGERY

## 2017-02-10 PROCEDURE — 25000128 H RX IP 250 OP 636: Performed by: NURSE ANESTHETIST, CERTIFIED REGISTERED

## 2017-02-10 PROCEDURE — 25000125 ZZHC RX 250: Performed by: SURGERY

## 2017-02-10 PROCEDURE — 36000058 ZZH SURGERY LEVEL 3 EA 15 ADDTL MIN: Performed by: SURGERY

## 2017-02-10 PROCEDURE — 37000008 ZZH ANESTHESIA TECHNICAL FEE, 1ST 30 MIN: Performed by: SURGERY

## 2017-02-10 PROCEDURE — 80053 COMPREHEN METABOLIC PANEL: CPT | Performed by: PHYSICIAN ASSISTANT

## 2017-02-10 PROCEDURE — 82248 BILIRUBIN DIRECT: CPT | Performed by: PHYSICIAN ASSISTANT

## 2017-02-10 PROCEDURE — 25000128 H RX IP 250 OP 636: Performed by: ANESTHESIOLOGY

## 2017-02-10 PROCEDURE — 27210794 ZZH OR GENERAL SUPPLY STERILE: Performed by: SURGERY

## 2017-02-10 PROCEDURE — 47562 LAPAROSCOPIC CHOLECYSTECTOMY: CPT | Performed by: SURGERY

## 2017-02-10 PROCEDURE — 25000125 ZZHC RX 250: Performed by: PHYSICIAN ASSISTANT

## 2017-02-10 PROCEDURE — 71000012 ZZH RECOVERY PHASE 1 LEVEL 1 FIRST HR: Performed by: SURGERY

## 2017-02-10 PROCEDURE — 25000128 H RX IP 250 OP 636: Performed by: SURGERY

## 2017-02-10 PROCEDURE — 71000013 ZZH RECOVERY PHASE 1 LEVEL 1 EA ADDTL HR: Performed by: SURGERY

## 2017-02-10 PROCEDURE — 25800025 ZZH RX 258: Performed by: SURGERY

## 2017-02-10 PROCEDURE — G0378 HOSPITAL OBSERVATION PER HR: HCPCS

## 2017-02-10 PROCEDURE — 88304 TISSUE EXAM BY PATHOLOGIST: CPT | Performed by: SURGERY

## 2017-02-10 PROCEDURE — 99219 ZZC INITIAL OBSERVATION CARE,LEVL II: CPT | Mod: 57 | Performed by: SURGERY

## 2017-02-10 PROCEDURE — 83690 ASSAY OF LIPASE: CPT | Performed by: PHYSICIAN ASSISTANT

## 2017-02-10 PROCEDURE — 25000125 ZZHC RX 250: Performed by: ANESTHESIOLOGY

## 2017-02-10 PROCEDURE — 36415 COLL VENOUS BLD VENIPUNCTURE: CPT | Performed by: PHYSICIAN ASSISTANT

## 2017-02-10 PROCEDURE — 25000125 ZZHC RX 250: Performed by: NURSE ANESTHETIST, CERTIFIED REGISTERED

## 2017-02-10 RX ORDER — OXYCODONE AND ACETAMINOPHEN 5; 325 MG/1; MG/1
1-2 TABLET ORAL EVERY 4 HOURS PRN
Qty: 25 TABLET | Refills: 0 | Status: SHIPPED | OUTPATIENT
Start: 2017-02-10 | End: 2019-12-13

## 2017-02-10 RX ORDER — PROPOFOL 10 MG/ML
INJECTION, EMULSION INTRAVENOUS PRN
Status: DISCONTINUED | OUTPATIENT
Start: 2017-02-10 | End: 2017-02-10

## 2017-02-10 RX ORDER — HYDROMORPHONE HYDROCHLORIDE 1 MG/ML
.3-.5 INJECTION, SOLUTION INTRAMUSCULAR; INTRAVENOUS; SUBCUTANEOUS EVERY 5 MIN PRN
Status: DISCONTINUED | OUTPATIENT
Start: 2017-02-10 | End: 2017-02-10 | Stop reason: HOSPADM

## 2017-02-10 RX ORDER — SODIUM CHLORIDE, SODIUM LACTATE, POTASSIUM CHLORIDE, CALCIUM CHLORIDE 600; 310; 30; 20 MG/100ML; MG/100ML; MG/100ML; MG/100ML
INJECTION, SOLUTION INTRAVENOUS CONTINUOUS PRN
Status: DISCONTINUED | OUTPATIENT
Start: 2017-02-10 | End: 2017-02-10

## 2017-02-10 RX ORDER — CEFAZOLIN SODIUM 2 G/100ML
2 INJECTION, SOLUTION INTRAVENOUS
Status: COMPLETED | OUTPATIENT
Start: 2017-02-10 | End: 2017-02-10

## 2017-02-10 RX ORDER — ONDANSETRON 2 MG/ML
4 INJECTION INTRAMUSCULAR; INTRAVENOUS EVERY 30 MIN PRN
Status: DISCONTINUED | OUTPATIENT
Start: 2017-02-10 | End: 2017-02-10 | Stop reason: HOSPADM

## 2017-02-10 RX ORDER — ONDANSETRON 2 MG/ML
INJECTION INTRAMUSCULAR; INTRAVENOUS PRN
Status: DISCONTINUED | OUTPATIENT
Start: 2017-02-10 | End: 2017-02-10

## 2017-02-10 RX ORDER — NEOSTIGMINE METHYLSULFATE 1 MG/ML
VIAL (ML) INJECTION PRN
Status: DISCONTINUED | OUTPATIENT
Start: 2017-02-10 | End: 2017-02-10

## 2017-02-10 RX ORDER — AMOXICILLIN 250 MG
1-2 CAPSULE ORAL 2 TIMES DAILY
Qty: 15 TABLET | Refills: 1 | Status: SHIPPED | OUTPATIENT
Start: 2017-02-10 | End: 2017-02-10

## 2017-02-10 RX ORDER — BUPIVACAINE HYDROCHLORIDE AND EPINEPHRINE 5; 5 MG/ML; UG/ML
INJECTION, SOLUTION PERINEURAL PRN
Status: DISCONTINUED | OUTPATIENT
Start: 2017-02-10 | End: 2017-02-10 | Stop reason: HOSPADM

## 2017-02-10 RX ORDER — LABETALOL HYDROCHLORIDE 5 MG/ML
10 INJECTION, SOLUTION INTRAVENOUS
Status: COMPLETED | OUTPATIENT
Start: 2017-02-10 | End: 2017-02-10

## 2017-02-10 RX ORDER — GLYCOPYRROLATE 0.2 MG/ML
INJECTION, SOLUTION INTRAMUSCULAR; INTRAVENOUS PRN
Status: DISCONTINUED | OUTPATIENT
Start: 2017-02-10 | End: 2017-02-10

## 2017-02-10 RX ORDER — SODIUM CHLORIDE, SODIUM LACTATE, POTASSIUM CHLORIDE, CALCIUM CHLORIDE 600; 310; 30; 20 MG/100ML; MG/100ML; MG/100ML; MG/100ML
INJECTION, SOLUTION INTRAVENOUS CONTINUOUS
Status: DISCONTINUED | OUTPATIENT
Start: 2017-02-10 | End: 2017-02-10 | Stop reason: HOSPADM

## 2017-02-10 RX ORDER — OXYCODONE AND ACETAMINOPHEN 5; 325 MG/1; MG/1
1-2 TABLET ORAL
Status: DISCONTINUED | OUTPATIENT
Start: 2017-02-10 | End: 2017-02-10 | Stop reason: HOSPADM

## 2017-02-10 RX ORDER — DIMENHYDRINATE 50 MG/ML
25 INJECTION, SOLUTION INTRAMUSCULAR; INTRAVENOUS
Status: DISCONTINUED | OUTPATIENT
Start: 2017-02-10 | End: 2017-02-10 | Stop reason: HOSPADM

## 2017-02-10 RX ORDER — HYDRALAZINE HYDROCHLORIDE 20 MG/ML
2.5-5 INJECTION INTRAMUSCULAR; INTRAVENOUS EVERY 10 MIN PRN
Status: DISCONTINUED | OUTPATIENT
Start: 2017-02-10 | End: 2017-02-10 | Stop reason: HOSPADM

## 2017-02-10 RX ORDER — IBUPROFEN 400 MG/1
400 TABLET, FILM COATED ORAL ONCE
Status: COMPLETED | OUTPATIENT
Start: 2017-02-10 | End: 2017-02-10

## 2017-02-10 RX ORDER — FENTANYL CITRATE 50 UG/ML
INJECTION, SOLUTION INTRAMUSCULAR; INTRAVENOUS PRN
Status: DISCONTINUED | OUTPATIENT
Start: 2017-02-10 | End: 2017-02-10

## 2017-02-10 RX ORDER — ONDANSETRON 4 MG/1
4 TABLET, ORALLY DISINTEGRATING ORAL EVERY 30 MIN PRN
Status: DISCONTINUED | OUTPATIENT
Start: 2017-02-10 | End: 2017-02-10 | Stop reason: HOSPADM

## 2017-02-10 RX ORDER — DEXAMETHASONE SODIUM PHOSPHATE 4 MG/ML
INJECTION, SOLUTION INTRA-ARTICULAR; INTRALESIONAL; INTRAMUSCULAR; INTRAVENOUS; SOFT TISSUE PRN
Status: DISCONTINUED | OUTPATIENT
Start: 2017-02-10 | End: 2017-02-10

## 2017-02-10 RX ORDER — FENTANYL CITRATE 50 UG/ML
25-50 INJECTION, SOLUTION INTRAMUSCULAR; INTRAVENOUS
Status: DISCONTINUED | OUTPATIENT
Start: 2017-02-10 | End: 2017-02-10 | Stop reason: HOSPADM

## 2017-02-10 RX ADMIN — PROPOFOL 200 MG: 10 INJECTION, EMULSION INTRAVENOUS at 12:18

## 2017-02-10 RX ADMIN — Medication 2 MG: at 13:23

## 2017-02-10 RX ADMIN — LIDOCAINE HYDROCHLORIDE 50 MG: 10 INJECTION, SOLUTION EPIDURAL; INFILTRATION; INTRACAUDAL; PERINEURAL at 12:18

## 2017-02-10 RX ADMIN — ONDANSETRON 4 MG: 2 INJECTION INTRAMUSCULAR; INTRAVENOUS at 12:18

## 2017-02-10 RX ADMIN — LABETALOL HYDROCHLORIDE 10 MG: 5 INJECTION, SOLUTION INTRAVENOUS at 13:45

## 2017-02-10 RX ADMIN — FENTANYL CITRATE 100 MCG: 50 INJECTION, SOLUTION INTRAMUSCULAR; INTRAVENOUS at 12:18

## 2017-02-10 RX ADMIN — IBUPROFEN 400 MG: 200 TABLET, FILM COATED ORAL at 15:00

## 2017-02-10 RX ADMIN — SODIUM CHLORIDE, POTASSIUM CHLORIDE, SODIUM LACTATE AND CALCIUM CHLORIDE: 600; 310; 30; 20 INJECTION, SOLUTION INTRAVENOUS at 12:56

## 2017-02-10 RX ADMIN — HYDRALAZINE HYDROCHLORIDE 5 MG: 20 INJECTION INTRAMUSCULAR; INTRAVENOUS at 14:09

## 2017-02-10 RX ADMIN — SODIUM CHLORIDE: 9 INJECTION, SOLUTION INTRAVENOUS at 04:34

## 2017-02-10 RX ADMIN — HYDRALAZINE HYDROCHLORIDE 5 MG: 20 INJECTION INTRAMUSCULAR; INTRAVENOUS at 15:03

## 2017-02-10 RX ADMIN — GLYCOPYRROLATE 0.2 MG: 0.2 INJECTION, SOLUTION INTRAMUSCULAR; INTRAVENOUS at 12:18

## 2017-02-10 RX ADMIN — ROCURONIUM BROMIDE 30 MG: 10 INJECTION INTRAVENOUS at 12:18

## 2017-02-10 RX ADMIN — FENTANYL CITRATE 150 MCG: 50 INJECTION, SOLUTION INTRAMUSCULAR; INTRAVENOUS at 12:43

## 2017-02-10 RX ADMIN — CEFAZOLIN SODIUM 2 G: 2 INJECTION, SOLUTION INTRAVENOUS at 12:17

## 2017-02-10 RX ADMIN — MIDAZOLAM HYDROCHLORIDE 2 MG: 1 INJECTION, SOLUTION INTRAMUSCULAR; INTRAVENOUS at 12:16

## 2017-02-10 RX ADMIN — DEXAMETHASONE SODIUM PHOSPHATE 4 MG: 4 INJECTION, SOLUTION INTRAMUSCULAR; INTRAVENOUS at 12:18

## 2017-02-10 RX ADMIN — SODIUM CHLORIDE, POTASSIUM CHLORIDE, SODIUM LACTATE AND CALCIUM CHLORIDE: 600; 310; 30; 20 INJECTION, SOLUTION INTRAVENOUS at 11:29

## 2017-02-10 RX ADMIN — GLYCOPYRROLATE 0.4 MG: 0.2 INJECTION, SOLUTION INTRAMUSCULAR; INTRAVENOUS at 13:23

## 2017-02-10 ASSESSMENT — ENCOUNTER SYMPTOMS
SEIZURES: 0
STRIDOR: 0
DYSRHYTHMIAS: 0

## 2017-02-10 ASSESSMENT — LIFESTYLE VARIABLES: TOBACCO_USE: 0

## 2017-02-10 ASSESSMENT — COPD QUESTIONNAIRES: COPD: 0

## 2017-02-10 NOTE — PROGRESS NOTES
[facility]    Patient went to OR. Chart reviewed.     Labs improving s/p ERCP with sphincterotomy, sludge removal yesterday.   In OR for lap tri today.     Will sign off, please call with questions.     LATASHA Bright  [facility] Office: [phone]

## 2017-02-10 NOTE — DISCHARGE INSTRUCTIONS
HOME CARE FOLLOWING LAPAROSCOPIC CHOLECYSTECTOMY  GLORIA Mistry, JIMMIE Sahu, GLORIA Hyman. BUZZ Ballard      INCISIONAL CARE:  Replace the bandage over your incisions until all drainage stops, or if more comfortable to have in place.  If present, leave the steri-strips (white paper tapes) in place until they fall off.  If Dermabond (a type of skin glue) is present, leave in place until it wears/flakes off.     BATHING:  Avoid baths for 1 week after surgery.  Showers are okay.  You may wash your hair at any time.  Gently pat your incisions dry after bathing.    ACTIVITY:  Light Activity -- you may immediately be up and about as tolerated.  Driving -- you may drive when comfortable and off narcotic pain medications.  Light Work -- resume when comfortable off pain medications.  (If you can drive, you probably can work.)  Strenuous Work/Activity -- limit lifting to 20 pounds for 1 week.  Progressively increase with time.  Active Sports (running, biking, etc.) -- cautiously resume after 2 weeks.    DISCOMFORT:  Use pain medications as prescribed by your surgeon.  Take the pain medication with some food, when possible, to minimize side effects.  Intermittent use of ice packs at the incision sites may help during the first 48 hours.  Expect gradual improvement.  You may experience shoulder pain, which is due to the air placed within your abdomen during the procedure.  This is temporary and usually passes within 2 days.    DIET:  Drink plenty of fluids.  While taking pain medications, increase dietary fiber or add a fiber supplementation like Metamucil or Citrucel to help prevent constipation - a possible side effect of pain medications.  If taking Metamucil or Citrucel, take with plenty of fluids as instructed.  It is not uncommon to experience some bowel changes (loose stools or constipation) after surgery.  Your body has to adapt to you no longer having a gall bladder.  To help  minimize this side effect, avoid fatty foods for the first week after surgery.  You may then slowly increase the amount of fatty foods in your diet.      NAUSEA:  If nauseated from the anesthetic/pain meds; rest in bed, get up cautiously with assistance, and drink clear liquids (juice, tea, broth).    RETURN APPOINTMENT:  Schedule a follow-up visit 1-3 weeks post-op (you may do this any time after surgery is scheduled).  Office Phone:  [phone]      CONTACT US IF THE FOLLOWING DEVELOPS:  1.  A fever that is above 101    2.  If there is a large amount of drainage, bleeding, or swelling.  3.  Severe pain that is not relieved by your prescription.  4.  Drainage that is thick, cloudy, yellow, green or white.  5.  Any other questions not answered by  Frequently Asked Questions  sheet.         GENERAL ANESTHESIA OR SEDATION ADULT DISCHARGE INSTRUCTIONS   SPECIAL PRECAUTIONS FOR 24 HOURS AFTER SURGERY    IT IS NOT UNUSUAL TO FEEL LIGHT-HEADED OR FAINT, UP TO 24 HOURS AFTER SURGERY OR WHILE TAKING PAIN MEDICATION.  IF YOU HAVE THESE SYMPTOMS; SIT FOR A FEW MINUTES BEFORE STANDING AND HAVE SOMEONE ASSIST YOU WHEN YOU GET UP TO WALK OR USE THE BATHROOM.    YOU SHOULD REST AND RELAX FOR THE NEXT 24 HOURS AND YOU MUST MAKE ARRANGEMENTS TO HAVE SOMEONE STAY WITH YOU FOR AT LEAST 24 HOURS AFTER YOUR DISCHARGE.  AVOID HAZARDOUS AND STRENUOUS ACTIVITIES.  DO NOT MAKE IMPORTANT DECISIONS FOR 24 HOURS.    DO NOT DRIVE ANY VEHICLE OR OPERATE MECHANICAL EQUIPMENT FOR 24 HOURS FOLLOWING THE END OF YOUR SURGERY.  EVEN THOUGH YOU MAY FEEL NORMAL, YOUR REACTIONS MAY BE AFFECTED BY THE MEDICATION YOU HAVE RECEIVED.    DO NOT DRINK ALCOHOLIC BEVERAGES FOR 24 HOURS FOLLOWING YOUR SURGERY.    DRINK CLEAR LIQUIDS (APPLE JUICE, GINGER ALE, 7-UP, BROTH, ETC.).  PROGRESS TO YOUR REGULAR DIET AS YOU FEEL ABLE.    YOU MAY HAVE A DRY MOUTH, A SORE THROAT, MUSCLES ACHES OR TROUBLE SLEEPING.  THESE SHOULD GO AWAY AFTER 24 HOURS.    CALL YOUR DOCTOR  FOR ANY OF THE FOLLOWING:  SIGNS OF INFECTION (FEVER, GROWING TENDERNESS AT THE SURGERY SITE, A LARGE AMOUNT OF DRAINAGE OR BLEEDING, SEVERE PAIN, FOUL-SMELLING DRAINAGE, REDNESS OR SWELLING.    IT HAS BEEN OVER 8 TO 10 HOURS SINCE SURGERY AND YOU ARE STILL NOT ABLE TO URINATE (PASS WATER).         Medications Given:  **You received Toradol, an IV form of ibuprofen (Motrin) at 3:00  PM.  Do not take any ibuprofen products until 9:00 PM.

## 2017-02-10 NOTE — PROVIDER NOTIFICATION
Dr. Kuhn notifed that patient requested a non-narcotic medication for pain. Orders received for Motrin 400 mg orally.

## 2017-02-10 NOTE — ANESTHESIA CARE TRANSFER NOTE
Patient: Krista Antonio    Procedure(s):  LAPAROSCOPIC CHOLECYSTECTOMY - Wound Class: II-Clean Contaminated    Diagnosis: unknown  Diagnosis Additional Information: No value filed.    Anesthesia Type:   General, ETT     Note:  Airway :Face Mask  Patient transferred to:[facility]        Vitals: (Last set prior to Anesthesia Care Transfer)    CRNA VITALS  2/10/2017 1307 - 2/10/2017 1340      2/10/2017             Pulse: 97    SpO2: 99 %    Resp Rate (observed): 15                Electronically Signed By: REANNA Acevedo CRNA  February 10, 2017  1:40 PM

## 2017-02-10 NOTE — ANESTHESIA PREPROCEDURE EVALUATION
Anesthesia Evaluation     . Pt has had prior anesthetic. Type: General    History of anesthetic complications  - difficult intubation    ROS/MED HX    ENT/Pulmonary:      (-) tobacco use, asthma, COPD, JARON risk factors and recent URI   Neurologic:  - neg neurologic ROS    (-) seizures and CVA   Cardiovascular:     (+) Dyslipidemia, ----. : . . . :. .      (-) hypertension, CAD, arrhythmias and valvular problems/murmurs   METS/Exercise Tolerance:     Hematologic: Comments: Lab Test        02/10/17     02/09/17                       0615          0927          WBC          7.5          5.0           HGB          14.3         14.5          MCV          89           88            PLT          267          250           INR           --          0.94           Lab Test        02/10/17     02/09/17                       0615          0927          NA           139          137           POTASSIUM    4.2          3.9           CHLORIDE     104          102           CO2          23           24            BUN          13           15            CR           0.71         0.70          ANIONGAP     12           11            NANCI          8.2*         8.3*          GLC          121*         123*           - neg hematologic  ROS       Musculoskeletal:  - neg musculoskeletal ROS       GI/Hepatic:  - neg GI/hepatic ROS      (-) GERD, hepatitis and liver disease   Renal/Genitourinary:  - ROS Renal section negative       Endo:     (+) type II DM Not using insulin - not using insulin pump .   (-) Type I DM, thyroid disease, chronic steroid usage and obesity   Psychiatric:  - neg psychiatric ROS       Infectious Disease:  - neg infectious disease ROS       Malignancy:         Other:    - neg other ROS           Physical Exam  Normal systems: cardiovascular, pulmonary and dental    Airway   Mallampati: II  TM distance: >3 FB  Neck ROM: limited    Dental     Cardiovascular   Rhythm and rate: regular and normal  (-) no friction rub,  no systolic click and no murmur    Pulmonary    breath sounds clear to auscultation(-) no rhonchi, no decreased breath sounds, no wheezes, no rales and no stridor                    Anesthesia Plan      History & Physical Review  History and physical reviewed and following examination; no interval change.    ASA Status:  2 .    NPO Status:  > 8 hours    Plan for General and ETT with Intravenous induction.   PONV prophylaxis:  Ondansetron (or other 5HT-3) and Dexamethasone or Solumedrol       Postoperative Care  Postoperative pain management:  IV analgesics.      Consents  Anesthetic plan, risks, benefits and alternatives discussed with:  Patient or representative, Patient and Spouse..                          .

## 2017-02-10 NOTE — ED NOTES
PRIMARY DIAGNOSIS: BILIARY COLIC-s/p EUS and ERCP  OUTPATIENT/OBSERVATION GOALS TO BE MET BEFORE DISCHARGE    1. Pain status: Pain free.  2. Stable vital signs and labs (if performed) at disposition: Yes  3. Tolerating adequate PO diet: No-NPO for possible surgery tomorrow  4. Successful cholecystectomy or clear follow up plan with General Surgery team if immediate surgery not performed - Possible tri tomorrow  5. ADLs back to baseline?  Yes  6. Activity and level of assistance: Ambulating independently.  7. Barriers to discharge noted No  8.  Labs:  ALT 1120,     Is patient a falls risk? No  Falls armband on?  No  Within Arm's Reach? No.Reason not within arm's reach is: no falls risk  Bed alarm turned on?   Not applicable,    Personal alarm in place and turned on?   Not applicable,      A & 0 x 4. VSS on RA. Denies pain, nausea, dizziness.  Ambulating independently in room and hallway.  Voiding without difficulty.   NPO for possible tri tomorrow, IVFs infusing. Continue to monitor and offer supportive cares.

## 2017-02-10 NOTE — ED NOTES
Bed: 0222-01  Expected date: 2/9/17  Expected time:   Means of arrival:   Comments:  renaldo- going to or first

## 2017-02-10 NOTE — ED NOTES
PRIMARY DIAGNOSIS: BILIARY COLIC-s/p EUS and ERCP  OUTPATIENT/OBSERVATION GOALS TO BE MET BEFORE DISCHARGE    1. Pain status: Pain free.  2. Stable vital signs and labs (if performed) at disposition: Yes  3. Tolerating adequate PO diet: No-NPO for possible surgery tomorrow  4. Successful cholecystectomy or clear follow up plan with General Surgery team if immediate surgery not performed - Possible tri tomorrow  5. ADLs back to baseline?  Yes  6. Activity and level of assistance: Ambulating independently.  7. Barriers to discharge noted No  8.  Labs:  ALT 1120,     Is patient a falls risk? No  Falls armband on?  No  Within Arm's Reach? No.Reason not within arm's reach is: no falls risk  Bed alarm turned on?   Not applicable,     Personal alarm in place and turned on?   Not applicable,      A & 0 x 4. VSS on RA. Denies pain, nausea, dizziness.  Ambulating independently.  Voiding without difficulty.   NPO (pt is aware of reason why), for possible tri tomorrow, IVFs infusing. Continue to monitor and offer supportive cares.

## 2017-02-10 NOTE — ED NOTES
OBSERVATION patient IN TIME: 1809  ROOM #  222    Living Situation (if not independent, order SW consult): Lives in home with wife  Facility name:    Activity level at baseline:Ind  Activity level on admit: Ind    Is patient a falls risk? No  Falls armband on? Not applicable, not falls risk  Within Arm's Reach? No.Reason not within arm's reach is:    Bed alarm turned on?   Not applicable,    Personal alarm in place and turned on?   Not applicable,      Patient registered to observation; given Patient Bill of Rights; given the opportunity to ask questions about observation status and their plan of care.  Patient has been oriented to the observation room, bathroom, and call light is in place.    : Norma, spouse -see demographics

## 2017-02-10 NOTE — OP NOTE
-   DATE OF SERVICE: 2/10/2017     SURGEON   MACK TRUJILLO MD     ASSISTANT   Jane Serna PA-C and A physician assistant was necessary to assist with retraction and suturing and thereby reduce operative time and time under anesthesia.    PREOPERATIVE DIAGNOSIS   Cholelithiasis with choledocholithiasis.     POSTOPERATIVE DIAGNOSIS   Same.     PROCEDURE   Laparoscopic cholecystectomy.     SPECIMEN   Gallbladder.     ESTIMATED BLOOD LOSS   25.     COMPLICATIONS   None.     FINDINGS  Fibrosis and resolving inflammation around gallbladder.    INDICATIONS AND DESCRIPTION OF THE PROCEDURE   This is a 71 year old male with choledocholithiasis who underwent ERCP and sphincterotomy. Evaluation was consistent with additional stones in the gallbladder. The patient was therefore offered a laparoscopic cholecystectomy after a full discussion of risks, benefits, and alternatives. Informed consent was obtained. The patient was taken to the operating room and placed on the operating room table in the supine position. General anesthesia was induced. The patient's abdomen was prepped and draped in the usual sterile fashion. A supraumbilical approximately 2 cm incision was created in the midline sharply after anesthetizing the skin, and was carried down to the fascia bluntly as well as with cautery. The fascia was then grasped with Kocher clamps and elevated. The fascia was divided vertically in the midline. 2 stay sutures of 0 Vicryl were placed at either end across the incision. A finger sweep was then used to confirm entry into the peritoneal cavity. A 12 mm Nadeem trocar was then placed into the abdomen and the abdomen was inspected. Evidence of fibrosis around the gallbladder and mild edema and swelling of the wall was found. Three additional ports were placed--one in the epigastrium and two in the right costal margin, all 5 mm in size. Next, using the lateral ports, the gallbladder was then grasped at the fundus and  elevated, and Corrie's pouch was then grasped and retracted laterally to expose the triangle of Calot. Using sharp dissection with hook cautery as well as blunt dissection, the cystic artery and cystic duct were skeletonized. The cystic duct appeared normal in size. Ultimately, both structures were circumferentially dissected and the gallbladder was elevated off the liver plate for approximately one-third of the distance which confirmed the critical view of safety. The cystic duct was triply clipped on the patient side and divided. The cystic artery was also clipped and divided with scissors. Next, hook cautery was used to remove the gallbladder from the liver bed. The gallbladder was then removed from the umbilical port site using an EndoCatch bag. Small oozing points on the gallbladder bed surface were then controlled with cautery. The abdomen was then copiously irrigated and suctioned dry. Hemostasis appeared excellent. The fascia of the port site for the umbilical port was then closed using a figure-of-eight stitch of 0 Vicryl once the abdomen had been desufflated and the ports had been removed under direct vision and the stay sutures were also tied; the wound was then irrigated and suctioned dry. 4-0 Vicryl subcuticular stitches were used to close the skin. Benzoin, Steri-Strips and sterile dressings were applied. This terminated the procedure. All sponge and instrument counts were correct x2 at the end of the procedure.     MACK TRUJILLO MD

## 2017-02-10 NOTE — CONSULTS
[facility]     Hospital Consultation     Krista Antonio MRN# [id number]   YOB: 1945 Age: 71 year old     Primary care provider: [facility], [place]    ASSESSMENT:   Krista Antonio is an 71 year old year old male who I was asked to see by Jenn Rausch for common bile duct sludge.    Successful ERCP and sphincterotomy yesterday. Recommended cholecystectomy by the gastroenterologist following this to prevent any further episodes.    RECOMMENDATIONS:   We discussed this. I think that he is a candidate for surgery today and I expect this to be a same-day procedure with him able to discharge home postoperatively. We discussed the perioperative expectations including expected postoperative pain and we discussed the restrictions after surgery. We'll move forward with surgery scheduled for noon today.    Ashwin Kemp MD  [phone] (r)  Click here to send text page.     This note was created using voice recognition software. Undetected word substitutions or other errors may have occurred.      HPI:    Krista Antonio is a 71 year old male who I was asked to see following his ERCP yesterday. He is a generally healthy male who presented to the emergency room with complaints of 4 days of intense constant epigastric pain which was not radiating. It was 10/10. There was no nausea or vomiting. There were no fevers or chills. There was no jaundice or pancreatitis in the past that he knows of. His LFTs are quite elevated and gastroenterology was called. They took him for an ERCP with sphincterotomy yesterday. He now feels much better. Surgery was recommended to avoid further problems.          PAST MEDICAL HISTORY:   History reviewed. No pertinent past medical history.     PAST SURGICAL HISTORY:   History reviewed. No pertinent past surgical history.    SOCIAL HISTORY:     Social History     Social History     Marital Status:      Spouse Name: N/A     Number of  "Children: N/A     Years of Education: N/A     Social History Main Topics     Smoking status: Never Smoker      Smokeless tobacco: None     Alcohol Use: None     Drug Use: None     Sexual Activity: Not Asked     Other Topics Concern     None     Social History Narrative     None        FAMILY HISTORY:   History reviewed. No pertinent family history.    MEDICATIONS:     Current Outpatient Prescriptions   Medication Sig Dispense Refill     ASPIRIN EC PO Take 81 mg by mouth every evening        metFORMIN (GLUCOPHAGE-XR) 500 MG 24 hr tablet Take 1,000 mg by mouth daily (with dinner)        Rosuvastatin Calcium (CRESTOR PO) Take 10 mg by mouth every evening        Coenzyme Q10 (COQ10 PO) Take 1 tablet by mouth every evening        multivitamin, therapeutic (THERA-VIT) TABS tablet Take 1 tablet by mouth every evening           ALLERGIES:     Allergies   Allergen Reactions     Atorvastatin      PN: arthralgias     Simvastatin      PN: arthralgias     Sulfa Drugs Rash        REVIEW OF SYSTEMS:   10 point ROS neg other than the symptoms noted above in the HPI or here.      PHYSICAL EXAM:   /81 mmHg  Pulse 71  Temp(Src) 96.6  F (35.9  C) (Oral)  Resp 18  Ht 1.753 m (5' 9\")  Wt 78.472 kg (173 lb)  BMI 25.54 kg/m2  SpO2 95% Estimated body mass index is 25.54 kg/(m^2) as calculated from the following:    Height as of this encounter: 1.753 m (5' 9\").    Weight as of this encounter: 78.472 kg (173 lb).   Constitutional: No acute distress  Eyes: Sclerae anicteric, moist conjunctivae; no lid-lag; PERRLA  ENT: Atraumatic; oropharynx clear with moist mucous membranes and no mucosal ulcerations; normal hard and soft palate  Neck: Supple neck without lymphadenopathy, no thyromegaly  Respiratory: Clear to auscultation bilaterally with normal respiratory effort  Cardiovascular: Regular rate and rhythm, no MRGs  GI: Soft, nontender, nondistended; no masses or HSM  Lymph/Hematologic: No pretibial edema  Genitourinary: " Deferred  Skin: Normal temperature, turgor and texture; no rash, ulcers or subcutaneous nodules  Musculoskeletal: Grossly symmetric and normal muscle bulk for age in all extremities; gait and station normal; no clubbing or cyanosis  Neurologic: CN II-XII grossly intact without deficits; sensation intact to light touch over all extremities  Neuropsychiatric: Appropriate affect, alert and oriented to person, place and time    LABORATORY AND IMAGING:      Results for orders placed or performed during the hospital encounter of 02/09/17   ENDOSCOPIC RETROGRADE CHOLANGIOPANCREATOGRAPHY   Result Value Ref Range    ERCP       [facility]  _______________________________________________________________________________  Patient Name: Krista Antonio          Procedure Date: 2/9/2017 3:56 PM  MRN: [id number]                       Account Number: [id number]  YOB: 1945              Admit Type: Inpatient  Age: 71                               Gender: Male  Attending MD: Jelly Carlton MD   Total Sedation Time:   Instrument Name: 154                    _______________________________________________________________________________     Procedure:                ERCP  Indications:              Suspected bile duct stone(s)  Providers:                Jelly Carlton MD (Doctor)  Referring MD:               Medicines:                General Anesthesia, Indomethacin 100 mg CT, Fortaz                             1 g  Complications:            No immediate complications.  _______________________________________________________________________________  Procedure:                Pre-Anesth esia Assessment:                            - Prior to the procedure, a History and Physical                             was performed, and patient medications and                             allergies were reviewed. The patient is competent.                             The risks and benefits of the procedure and the                              sedation options and risks were discussed with the                             patient. All questions were answered and informed                             consent was obtained. Patient identification and                             proposed procedure were verified by the physician                             in the procedure room. Mental Status Examination:                             alert and oriented. Airway Examination: normal                             oropharyngeal airway and neck mobility. Respiratory                             Examination: clear to auscultation. CV Examination:                             normal. Prophylactic Antibio tics: The patient                             requires prophylactic antibiotics. Prior                             Anticoagulants: The patient has taken aspirin. ASA                             Grade Assessment: III - A patient with severe                             systemic disease. After reviewing the risks and                             benefits, the patient was deemed in satisfactory                             condition to undergo the procedure. The anesthesia                             plan was to use general anesthesia. Immediately                             prior to administration of medications, the patient                             was re-assessed for adequacy to receive sedatives.                             The heart rate, respiratory rate, oxygen                             saturations, blood pressure, adequacy of pulmonary                             ventilation, and response to care were monitored                             throughout the procedure. The physical s tatus of                             the patient was re-assessed after the procedure.                            - Immediately prior to administration of                             medications, the patient was re-assessed for                             adequacy to  receive sedatives.                            - The heart rate, respiratory rate, oxygen                             saturations, blood pressure, adequacy of pulmonary                             ventilation, and response to care were monitored                             throughout the procedure.                            - The physical status of the patient was                             re-assessed after the procedure.                            After obtaining informed consent, the scope was                             passed under direct vision. Throughout the                             procedure, the patient's blood pressure, pulse, and                             oxygen saturations were monitored continuously. The                              Olympus Duodenoscope Model #[id number]-Q180V, SN#[id number]                             was introduced through the mouth, and used to                             inject contrast into and used to inject contrast                             into the bile duct. The ERCP was accomplished with                             ease. The patient tolerated the procedure well.                                                                                   Findings:       The major papilla was normal. The minor papilla was not seen. A long        0.035 inch Soft Jagwire was easily selectively passed into the biliary        tree. The short-nosed traction sphincterotome was passed over the        guidewire and the bile duct was then deeply cannulated. Contrast was        injected. I personally interpreted the bile duct images. Ductal flow of        contrast was adequate. Image quality was excellent. Contrast extended to        the gallbladder. Contrast extended to the hepatic ducts . The main bile        duct was normal. A 6 mm biliary sphincterotomy was made with a        monofilament traction (standard) sphincterotome using ERBE        electrocautery. There was self limited oozing from the  sphincterotomy        which did not require treatment. The biliary tree was swept with an 8.5        mm balloon starting at the biliary pancreatic junction, left main        hepatic duct and right main hepatic duct. Nothing was found. The        endoscope was withdrawn from the patient.                                                                                   Impression:               - Gallbladdder sludge, post ERCP and sphincterotomy.  Recommendation:           - Return patient to hospital [facility] for ongoing care.                            - Clear liquid diet for 1 day.                            - Observe patient's clinical course.                                                                                     PEDRO Carlton MD  _____________________  Jelly de souza MD  2/9/2017 4:49 PM  I was physically present for the entire viewing portion of the exam.  __________________________Jelly Carlton MD  Number of Addenda: 0    Note Initiated On: 2/9/2017 3:56 PM  MRN:                      [id number]  Procedure Date:           2/9/2017 3:56:30 PM  Scope Withdrawal Time: 0 hours 0 minutes 0 seconds   Total Procedure Duration: 0 hours 8 minutes 40 seconds   Estimated Blood Loss:       minimal  Scope In: 4:35:01 PM  Scope Out: 4:43:41 PM     UPPER EUS   Result Value Ref Range    Upper EUS       [facility]  _______________________________________________________________________________  Patient Name: Krista Antonio          Procedure Date: 2/9/2017 3:54 PM  MRN: [id number]                       Account Number: [id number]  YOB: 1945              Admit Type: Inpatient  Age: 71                               Gender: Male  Attending MD: Jelly Carlton MD   Total Sedation Time:   Instrument Name: 114                    _______________________________________________________________________________     Procedure:                Upper EUS  Indications:              Suspected  choledocholithiasis  Providers:                Jelly Carlton MD (Doctor)  Referring MD:               Medicines:                General Anesthesia  Complications:            No immediate complications.  _______________________________________________________________________________  Procedure:                Pre-Anesthesia Assessment:                            - Prior to the  procedure, a History and Physical                             was performed, and patient medications and                             allergies were reviewed. The patient is competent.                             The risks and benefits of the procedure and the                             sedation options and risks were discussed with the                             patient. All questions were answered and informed                             consent was obtained. Patient identification and                             proposed procedure were verified by the physician                             in the procedure room. Mental Status Examination:                             alert and oriented. Airway Examination: normal                             oropharyngeal airway and neck mobility. Respiratory                             Examination: clear to auscultation. CV Examination:                             normal. Prophylactic Antibiotics: The patient does                             not req uire prophylactic antibiotics. Prior                             Anticoagulants: The patient has taken aspirin. ASA                             Grade Assessment: III - A patient with severe                             systemic disease. After reviewing the risks and                             benefits, the patient was deemed in satisfactory                             condition to undergo the procedure. The anesthesia                             plan was to use general anesthesia. Immediately                             prior to administration of  medications, the patient                             was re-assessed for adequacy to receive sedatives.                             The heart rate, respiratory rate, oxygen                             saturations, blood pressure, adequacy of pulmonary                             ventilation, and response to care were monitored                             throughout the procedure. The physical status of                             the patient w as re-assessed after the procedure.                            - Immediately prior to administration of                             medications, the patient was re-assessed for                             adequacy to receive sedatives.                            - The heart rate, respiratory rate, oxygen                             saturations, blood pressure, adequacy of pulmonary                             ventilation, and response to care were monitored                             throughout the procedure.                            - The physical status of the patient was                             re-assessed after the procedure.                            After obtaining informed consent, the endoscope was                             passed under direct vision. Throughout the                             procedure, the patient's blood pressure, pulse, and                             oxygen saturations were monitored continuously. The                             Olympus EUS Linea r Scope Model [id number], Endora                             #114, SN#[id number] was introduced through the mouth,                             and advanced to the second part of duodenum. The                             upper EUS was accomplished with ease. The patient                             tolerated the procedure well.                                                                                   Findings:       Endoscopic Finding :       The ampulla and examined duodenum were normal.        Endosonographic Finding :       Moderate hyperechoic material consistent with sludge was visualized        endosonographically in the gallbladder body.       CBD was 4 mm. Unable to exlude small amount of sludge in proximal CBD       There was no sign of significant endosonographic abnormality in the        pancreatic head, in the pancreatic body, in the pancreatic tail, in the        main pancreatic duct and in the ampulla. The pancreas was well        visualized, no pathologic lymphadenopath y, no masses, no cysts, no        calcifications, the pancreatic duct was well visualized from ampulla to        tail, the pancreatic duct was thin in caliber, the pancreatic duct was        regular in contour. PD was 2.6 mm in the head and 1.9 mm in the body       Normal celiac axis and left adrenal gland. No worrisome mediastinal        nodes.                                                                                   Impression:               - Gallbladder sludge, cannot exclude small amount                             of sludge in proximal CBD  Recommendation:           - Perform an ERCP today.                                                                                     PEDRO Carlton MD  _____________________  Jelly Carlton MD  2/9/2017 4:53 PM  I was physically present for the entire viewing portion of the exam.  __________________________Jelly Carlton MD  Number of Addenda: 0    Note Initiated On: 2/9/2017 3:54 PM  MRN:                      [id number]  Procedu re Date:           2/9/2017 3:54:17 PM  Scope Withdrawal Time: 0 hours 0 minutes 0 seconds   Total Procedure Duration: 0 hours 6 minutes 23 seconds   Estimated Blood Loss:       none  Scope In: 4:20:43 PM  Scope Out: 4:27:06 PM     US Abdomen Limited    Narrative    ULTRASOUND ABDOMEN LIMITED 2/9/2017 10:05 AM     HISTORY: Right upper quadrant pain.    FINDINGS: Multiple small mobile echogenic foci are noted within the  gallbladder which may  represent nonshadowing stones. The gallbladder  wall was mildly thickened measuring 0.4 cm. The common bile duct was  prominent but likely normal for age. Increased hepatic echotexture  suggests fatty infiltration. The right kidney and visualized portion  of the pancreas appear within normal limits.      Impression    IMPRESSION: Multiple small gallstones. Nonspecific gallbladder wall  thickening. Probable hepatic fatty infiltration.    SAIDA DAS MD   XR ERCP    Narrative    This exam was marked as non-reportable because it will not be read by a   radiologist or a [place] non-radiologist provider.             CBC with platelets differential   Result Value Ref Range    WBC 5.0 4.0 - 11.0 10e9/L    RBC Count 4.82 4.4 - 5.9 10e12/L    Hemoglobin 14.5 13.3 - 17.7 g/dL    Hematocrit 42.5 40.0 - 53.0 %    MCV 88 78 - 100 fl    MCH 30.1 26.5 - 33.0 pg    MCHC 34.1 31.5 - 36.5 g/dL    RDW 12.9 10.0 - 15.0 %    Platelet Count 250 150 - 450 10e9/L    Diff Method Automated Method     % Neutrophils 68.9 %    % Lymphocytes 19.9 %    % Monocytes 8.6 %    % Eosinophils 1.8 %    % Basophils 0.4 %    % Immature Granulocytes 0.4 %    Nucleated RBCs 0 0 /100    Absolute Neutrophil 3.5 1.6 - 8.3 10e9/L    Absolute Lymphocytes 1.0 0.8 - 5.3 10e9/L    Absolute Monocytes 0.4 0.0 - 1.3 10e9/L    Absolute Eosinophils 0.1 0.0 - 0.7 10e9/L    Absolute Basophils 0.0 0.0 - 0.2 10e9/L    Abs Immature Granulocytes 0.0 0 - 0.4 10e9/L    Absolute Nucleated RBC 0.0    Comprehensive metabolic panel   Result Value Ref Range    Sodium 137 133 - 144 mmol/L    Potassium 3.9 3.4 - 5.3 mmol/L    Chloride 102 94 - 109 mmol/L    Carbon Dioxide 24 20 - 32 mmol/L    Anion Gap 11 3 - 14 mmol/L    Glucose 123 (H) 70 - 99 mg/dL    Urea Nitrogen 15 7 - 30 mg/dL    Creatinine 0.70 0.66 - 1.25 mg/dL    GFR Estimate >90  Non  GFR Calc   >60 mL/min/1.7m2    GFR Estimate If Black >90   GFR Calc   >60 mL/min/1.7m2    Calcium 8.3 (L)  8.5 - 10.1 mg/dL    Bilirubin Total 1.3 0.2 - 1.3 mg/dL    Albumin 3.6 3.4 - 5.0 g/dL    Protein Total 6.9 6.8 - 8.8 g/dL    Alkaline Phosphatase 170 (H) 40 - 150 U/L    ALT 1120 (HH) 0 - 70 U/L     (HH) 0 - 45 U/L   Lipase   Result Value Ref Range    Lipase 206 73 - 393 U/L   Troponin I   Result Value Ref Range    Troponin I ES  0.000 - 0.045 ug/L     <0.015  The 99th percentile for upper reference range is 0.045 ug/L.  Troponin values in   the range of 0.045 - 0.120 ug/L may be associated with risks of adverse   clinical events.     INR   Result Value Ref Range    INR 0.94 0.86 - 1.14   Glucose by meter   Result Value Ref Range    Glucose 113 (H) 70 - 99 mg/dL   Glucose by meter   Result Value Ref Range    Glucose 127 (H) 70 - 99 mg/dL   Comprehensive metabolic panel   Result Value Ref Range    Sodium 139 133 - 144 mmol/L    Potassium 4.2 3.4 - 5.3 mmol/L    Chloride 104 94 - 109 mmol/L    Carbon Dioxide 23 20 - 32 mmol/L    Anion Gap 12 3 - 14 mmol/L    Glucose 121 (H) 70 - 99 mg/dL    Urea Nitrogen 13 7 - 30 mg/dL    Creatinine 0.71 0.66 - 1.25 mg/dL    GFR Estimate >90 >60 mL/min/1.7m2    GFR Estimate If Black >90 >60 mL/min/1.7m2    Calcium 8.2 (L) 8.5 - 10.1 mg/dL    Bilirubin Total 0.8 0.2 - 1.3 mg/dL    Albumin 3.5 3.4 - 5.0 g/dL    Protein Total 6.8 6.8 - 8.8 g/dL    Alkaline Phosphatase 172 (H) 40 - 150 U/L     (HH) 0 - 70 U/L     (H) 0 - 45 U/L   CBC with platelets differential   Result Value Ref Range    WBC 7.5 4.0 - 11.0 10e9/L    RBC Count 4.78 4.4 - 5.9 10e12/L    Hemoglobin 14.3 13.3 - 17.7 g/dL    Hematocrit 42.4 40.0 - 53.0 %    MCV 89 78 - 100 fl    MCH 29.9 26.5 - 33.0 pg    MCHC 33.7 31.5 - 36.5 g/dL    RDW 13.1 10.0 - 15.0 %    Platelet Count 267 150 - 450 10e9/L    Diff Method Automated Method     % Neutrophils 81.8 %    % Lymphocytes 11.0 %    % Monocytes 6.5 %    % Eosinophils 0.0 %    % Basophils 0.3 %    % Immature Granulocytes 0.4 %    Nucleated RBCs 0 0 /100     Absolute Neutrophil 6.2 1.6 - 8.3 10e9/L    Absolute Lymphocytes 0.8 0.8 - 5.3 10e9/L    Absolute Monocytes 0.5 0.0 - 1.3 10e9/L    Absolute Eosinophils 0.0 0.0 - 0.7 10e9/L    Absolute Basophils 0.0 0.0 - 0.2 10e9/L    Abs Immature Granulocytes 0.0 0 - 0.4 10e9/L    Absolute Nucleated RBC 0.0    Lipase   Result Value Ref Range    Lipase 75 73 - 393 U/L   Bilirubin direct   Result Value Ref Range    Bilirubin Direct 0.2 0.0 - 0.2 mg/dL     I personally reviewed and interpreted the ultrasound. The gallbladder has a small amount of echogenic material in it. I do not see a measurement of the wall that is 4 mm as reported.    30 minutes were spent in this encounter, over 50% in counseling and coordination of care.

## 2017-02-12 NOTE — DISCHARGE SUMMARY
[facility]  Outpatient/Observation Unit  Discharge Summary        Patient ID:  Krista Antonio  [id number]  71 year old  1945    Admit date: 2/9/2017    Discharge date and time: 2/12/2017     Admitting Provider: Greyson Aaron MD    Discharge Provider: Jenn Rausch PA-C    Admission Diagnoses:  Cholelithiasis with choledocholithiasis [K80.70]    Discharge Diagnoses: Cholelithiasis with choledocholithiasis    Admission Condition: fair    Discharged Condition: good    Hospital Course: Krista Antonio is a 71 year old male with PMH significant for diabetes, HLD who presented to the hospital with episodic postprandial epigastric abdominal pain for the past 4 days. He reported eating a hamburger and drinking a beer at a [employer] party 4 days before admission; an hour later developed sharp nonradiating epigastric pain. His pain improved on its own, but every time he ate something the pain would recur and intensify. He took an aspirin to see if this would help but no change. His pain recurred this AM after drinking water, so he presented to the hospital for evaluation.  He denied any fevers, chills, heartburn, reflux, nausea, vomiting, or changes in his stools.     On arrival to ED, VSS. Labs show elevated transaminases (ALT 1120, ), alk phos 170, with normal bilirubin on CMP. Troponin < 0.015. CBC w/ diff unremarkable. INR 0.94. RUQ US showed gallbladder wall thickening 0.4 cm, gallstones, and a prominent common bile duct. GI was contacted and evaluated the patient in the ED. He has been placed NPO and underwent EUS showing gallbladder sludge with possible small amount of sludge in the CBD.  He subsequently underwent ERCP and sphincterotomy, gallbladder sludge was noted during this procedure.  He tolerated the procedure well with no elevation in lipase overnight and no post-op epigastric pain.  He was seen by the general surgery team who performed a laparoscopic cholecystectomy the  following day.  He was able to discharge in good condition after that procedure.  He was instructed to follow up with the surgical team per their recommendations on discharge from the PACU.      Reason for your hospital stay       You were evaluate in the hospital for upper abdominal pain related to   gallstones passing through your biliary tract.  You had an endoscopic   ultrasound and an ERCP to evaluate the biliary tract system, and they   found evidence of sludge, likely recently passed stone.  Today you had   surgery to remove your gallbladder so that you will not have symptoms like   this in the future.  You should follow up with the general surgery team in   clinic per their recommendations that they will give you on discharge   post-operatively.                  Consults: general surgery and gastroenterology    Significant Diagnostic Studies:   Results for orders placed or performed during the hospital encounter of 02/09/17   ENDOSCOPIC RETROGRADE CHOLANGIOPANCREATOGRAPHY   Result Value Ref Range    ERCP       [facility]  _______________________________________________________________________________  Patient Name: Krista Antonio          Procedure Date: 2/9/2017 3:56 PM  MRN: [id number]                       Account Number: [id number]  YOB: 1945              Admit Type: Inpatient  Age: 71                               Gender: Male  Attending MD: Jelly Carlton MD   Total Sedation Time:   Instrument Name: 154                    _______________________________________________________________________________     Procedure:                ERCP  Indications:              Suspected bile duct stone(s)  Providers:                Jelly Carlton MD (Doctor)  Referring MD:               Medicines:                General Anesthesia, Indomethacin 100 mg [place], Fortaz                             1 g  Complications:            No immediate  complications.  _______________________________________________________________________________  Procedure:                Pre-Anesth esia Assessment:                            - Prior to the procedure, a History and Physical                             was performed, and patient medications and                             allergies were reviewed. The patient is competent.                             The risks and benefits of the procedure and the                             sedation options and risks were discussed with the                             patient. All questions were answered and informed                             consent was obtained. Patient identification and                             proposed procedure were verified by the physician                             in the procedure room. Mental Status Examination:                             alert and oriented. Airway Examination: normal                             oropharyngeal airway and neck mobility. Respiratory                             Examination: clear to auscultation. CV Examination:                             normal. Prophylactic Antibio tics: The patient                             requires prophylactic antibiotics. Prior                             Anticoagulants: The patient has taken aspirin. ASA                             Grade Assessment: III - A patient with severe                             systemic disease. After reviewing the risks and                             benefits, the patient was deemed in satisfactory                             condition to undergo the procedure. The anesthesia                             plan was to use general anesthesia. Immediately                             prior to administration of medications, the patient                             was re-assessed for adequacy to receive sedatives.                             The heart rate, respiratory rate, oxygen                              saturations, blood pressure, adequacy of pulmonary                             ventilation, and response to care were monitored                             throughout the procedure. The physical s tatus of                             the patient was re-assessed after the procedure.                            - Immediately prior to administration of                             medications, the patient was re-assessed for                             adequacy to receive sedatives.                            - The heart rate, respiratory rate, oxygen                             saturations, blood pressure, adequacy of pulmonary                             ventilation, and response to care were monitored                             throughout the procedure.                            - The physical status of the patient was                             re-assessed after the procedure.                            After obtaining informed consent, the scope was                             passed under direct vision. Throughout the                             procedure, the patient's blood pressure, pulse, and                             oxygen saturations were monitored continuously. The                              Olympus Duodenoscope Model #[id number]-[id number], [id number]                             was introduced through the mouth, and used to                             inject contrast into and used to inject contrast                             into the bile duct. The ERCP was accomplished with                             ease. The patient tolerated the procedure well.                                                                                   Findings:       The major papilla was normal. The minor papilla was not seen. A long        0.035 inch Soft Jagwire was easily selectively passed into the biliary        tree. The short-nosed traction sphincterotome was passed over the        guidewire and the bile duct was then  deeply cannulated. Contrast was        injected. I personally interpreted the bile duct images. Ductal flow of        contrast was adequate. Image quality was excellent. Contrast extended to        the gallbladder. Contrast extended to the hepatic ducts . The main bile        duct was normal. A 6 mm biliary sphincterotomy was made with a        monofilament traction (standard) sphincterotome using ERBE        electrocautery. There was self limited oozing from the sphincterotomy        which did not require treatment. The biliary tree was swept with an 8.5        mm balloon starting at the biliary pancreatic junction, left main        hepatic duct and right main hepatic duct. Nothing was found. The        endoscope was withdrawn from the patient.                                                                                   Impression:               - Gallbladdder sludge, post ERCP and sphincterotomy.  Recommendation:           - Return patient to hospital [facility] for ongoing care.                            - Clear liquid diet for 1 day.                            - Observe patient's clinical course.                                                                                     PEDRO Carlton MD  _____________________  Jelly de souza MD  2/9/2017 4:49 PM  I was physically present for the entire viewing portion of the exam.  __________________________Jelly Carlton MD  Number of Addenda: 0    Note Initiated On: 2/9/2017 3:56 PM  MRN:                      [id number]  Procedure Date:           2/9/2017 3:56:30 PM  Scope Withdrawal Time: 0 hours 0 minutes 0 seconds   Total Procedure Duration: 0 hours 8 minutes 40 seconds   Estimated Blood Loss:       minimal  Scope In: 4:35:01 PM  Scope Out: 4:43:41 PM     UPPER EUS   Result Value Ref Range    Upper EUS       [facility]  _______________________________________________________________________________  Patient Name: Krista Antonio           Procedure Date: 2/9/2017 3:54 PM  MRN: [id number]                       Account Number: [id number]  YOB: 1945              Admit Type: Inpatient  Age: 71                               Gender: Male  Attending MD: Jelly Carlton MD   Total Sedation Time:   Instrument Name: 114                    _______________________________________________________________________________     Procedure:                Upper EUS  Indications:              Suspected choledocholithiasis  Providers:                Jelly Carlton MD (Doctor)  Referring MD:               Medicines:                General Anesthesia  Complications:            No immediate complications.  _______________________________________________________________________________  Procedure:                Pre-Anesthesia Assessment:                            - Prior to the  procedure, a History and Physical                             was performed, and patient medications and                             allergies were reviewed. The patient is competent.                             The risks and benefits of the procedure and the                             sedation options and risks were discussed with the                             patient. All questions were answered and informed                             consent was obtained. Patient identification and                             proposed procedure were verified by the physician                             in the procedure room. Mental Status Examination:                             alert and oriented. Airway Examination: normal                             oropharyngeal airway and neck mobility. Respiratory                             Examination: clear to auscultation. CV Examination:                             normal. Prophylactic Antibiotics: The patient does                             not req uire prophylactic antibiotics. Prior                             Anticoagulants: The patient  has taken aspirin. ASA                             Grade Assessment: III - A patient with severe                             systemic disease. After reviewing the risks and                             benefits, the patient was deemed in satisfactory                             condition to undergo the procedure. The anesthesia                             plan was to use general anesthesia. Immediately                             prior to administration of medications, the patient                             was re-assessed for adequacy to receive sedatives.                             The heart rate, respiratory rate, oxygen                             saturations, blood pressure, adequacy of pulmonary                             ventilation, and response to care were monitored                             throughout the procedure. The physical status of                             the patient w as re-assessed after the procedure.                            - Immediately prior to administration of                             medications, the patient was re-assessed for                             adequacy to receive sedatives.                            - The heart rate, respiratory rate, oxygen                             saturations, blood pressure, adequacy of pulmonary                             ventilation, and response to care were monitored                             throughout the procedure.                            - The physical status of the patient was                             re-assessed after the procedure.                            After obtaining informed consent, the endoscope was                             passed under direct vision. Throughout the                             procedure, the patient's blood pressure, pulse, and                             oxygen saturations were monitored continuously. The                             Olympus EUS Linea r Scope Model [id number], Endora                              #114, #[id number] was introduced through the mouth,                             and advanced to the second part of duodenum. The                             upper EUS was accomplished with ease. The patient                             tolerated the procedure well.                                                                                   Findings:       Endoscopic Finding :       The ampulla and examined duodenum were normal.       Endosonographic Finding :       Moderate hyperechoic material consistent with sludge was visualized        endosonographically in the gallbladder body.       CBD was 4 mm. Unable to exlude small amount of sludge in proximal CBD       There was no sign of significant endosonographic abnormality in the        pancreatic head, in the pancreatic body, in the pancreatic tail, in the        main pancreatic duct and in the ampulla. The pancreas was well        visualized, no pathologic lymphadenopath y, no masses, no cysts, no        calcifications, the pancreatic duct was well visualized from ampulla to        tail, the pancreatic duct was thin in caliber, the pancreatic duct was        regular in contour. PD was 2.6 mm in the head and 1.9 mm in the body       Normal celiac axis and left adrenal gland. No worrisome mediastinal        nodes.                                                                                   Impression:               - Gallbladder sludge, cannot exclude small amount                             of sludge in proximal CBD  Recommendation:           - Perform an ERCP today.                                                                                     PEDRO Carlton MD  _____________________  Jelly Carlton MD  2/9/2017 4:53 PM  I was physically present for the entire viewing portion of the exam.  __________________________Jelly Carlton MD  Number of Addenda: 0    Note Initiated On: 2/9/2017 3:54 PM  MRN:                       [id number]  Procedu re Date:           2/9/2017 3:54:17 PM  Scope Withdrawal Time: 0 hours 0 minutes 0 seconds   Total Procedure Duration: 0 hours 6 minutes 23 seconds   Estimated Blood Loss:       none  Scope In: 4:20:43 PM  Scope Out: 4:27:06 PM     US Abdomen Limited    Narrative    ULTRASOUND ABDOMEN LIMITED 2/9/2017 10:05 AM     HISTORY: Right upper quadrant pain.    FINDINGS: Multiple small mobile echogenic foci are noted within the  gallbladder which may represent nonshadowing stones. The gallbladder  wall was mildly thickened measuring 0.4 cm. The common bile duct was  prominent but likely normal for age. Increased hepatic echotexture  suggests fatty infiltration. The right kidney and visualized portion  of the pancreas appear within normal limits.      Impression    IMPRESSION: Multiple small gallstones. Nonspecific gallbladder wall  thickening. Probable hepatic fatty infiltration.    SAIDA DAS MD   XR ERCP    Narrative    This exam was marked as non-reportable because it will not be read by a   radiologist or a [place] non-radiologist provider.             CBC with platelets differential   Result Value Ref Range    WBC 5.0 4.0 - 11.0 10e9/L    RBC Count 4.82 4.4 - 5.9 10e12/L    Hemoglobin 14.5 13.3 - 17.7 g/dL    Hematocrit 42.5 40.0 - 53.0 %    MCV 88 78 - 100 fl    MCH 30.1 26.5 - 33.0 pg    MCHC 34.1 31.5 - 36.5 g/dL    RDW 12.9 10.0 - 15.0 %    Platelet Count 250 150 - 450 10e9/L    Diff Method Automated Method     % Neutrophils 68.9 %    % Lymphocytes 19.9 %    % Monocytes 8.6 %    % Eosinophils 1.8 %    % Basophils 0.4 %    % Immature Granulocytes 0.4 %    Nucleated RBCs 0 0 /100    Absolute Neutrophil 3.5 1.6 - 8.3 10e9/L    Absolute Lymphocytes 1.0 0.8 - 5.3 10e9/L    Absolute Monocytes 0.4 0.0 - 1.3 10e9/L    Absolute Eosinophils 0.1 0.0 - 0.7 10e9/L    Absolute Basophils 0.0 0.0 - 0.2 10e9/L    Abs Immature Granulocytes 0.0 0 - 0.4 10e9/L    Absolute Nucleated RBC 0.0    Comprehensive  metabolic panel   Result Value Ref Range    Sodium 137 133 - 144 mmol/L    Potassium 3.9 3.4 - 5.3 mmol/L    Chloride 102 94 - 109 mmol/L    Carbon Dioxide 24 20 - 32 mmol/L    Anion Gap 11 3 - 14 mmol/L    Glucose 123 (H) 70 - 99 mg/dL    Urea Nitrogen 15 7 - 30 mg/dL    Creatinine 0.70 0.66 - 1.25 mg/dL    GFR Estimate >90  Non  GFR Calc   >60 mL/min/1.7m2    GFR Estimate If Black >90   GFR Calc   >60 mL/min/1.7m2    Calcium 8.3 (L) 8.5 - 10.1 mg/dL    Bilirubin Total 1.3 0.2 - 1.3 mg/dL    Albumin 3.6 3.4 - 5.0 g/dL    Protein Total 6.9 6.8 - 8.8 g/dL    Alkaline Phosphatase 170 (H) 40 - 150 U/L    ALT 1120 (HH) 0 - 70 U/L     (HH) 0 - 45 U/L   Lipase   Result Value Ref Range    Lipase 206 73 - 393 U/L   Troponin I   Result Value Ref Range    Troponin I ES  0.000 - 0.045 ug/L     <0.015  The 99th percentile for upper reference range is 0.045 ug/L.  Troponin values in   the range of 0.045 - 0.120 ug/L may be associated with risks of adverse   clinical events.     INR   Result Value Ref Range    INR 0.94 0.86 - 1.14   Glucose by meter   Result Value Ref Range    Glucose 113 (H) 70 - 99 mg/dL   Glucose by meter   Result Value Ref Range    Glucose 127 (H) 70 - 99 mg/dL   Comprehensive metabolic panel   Result Value Ref Range    Sodium 139 133 - 144 mmol/L    Potassium 4.2 3.4 - 5.3 mmol/L    Chloride 104 94 - 109 mmol/L    Carbon Dioxide 23 20 - 32 mmol/L    Anion Gap 12 3 - 14 mmol/L    Glucose 121 (H) 70 - 99 mg/dL    Urea Nitrogen 13 7 - 30 mg/dL    Creatinine 0.71 0.66 - 1.25 mg/dL    GFR Estimate >90 >60 mL/min/1.7m2    GFR Estimate If Black >90 >60 mL/min/1.7m2    Calcium 8.2 (L) 8.5 - 10.1 mg/dL    Bilirubin Total 0.8 0.2 - 1.3 mg/dL    Albumin 3.5 3.4 - 5.0 g/dL    Protein Total 6.8 6.8 - 8.8 g/dL    Alkaline Phosphatase 172 (H) 40 - 150 U/L     (HH) 0 - 70 U/L     (H) 0 - 45 U/L   CBC with platelets differential   Result Value Ref Range    WBC 7.5 4.0 -  11.0 10e9/L    RBC Count 4.78 4.4 - 5.9 10e12/L    Hemoglobin 14.3 13.3 - 17.7 g/dL    Hematocrit 42.4 40.0 - 53.0 %    MCV 89 78 - 100 fl    MCH 29.9 26.5 - 33.0 pg    MCHC 33.7 31.5 - 36.5 g/dL    RDW 13.1 10.0 - 15.0 %    Platelet Count 267 150 - 450 10e9/L    Diff Method Automated Method     % Neutrophils 81.8 %    % Lymphocytes 11.0 %    % Monocytes 6.5 %    % Eosinophils 0.0 %    % Basophils 0.3 %    % Immature Granulocytes 0.4 %    Nucleated RBCs 0 0 /100    Absolute Neutrophil 6.2 1.6 - 8.3 10e9/L    Absolute Lymphocytes 0.8 0.8 - 5.3 10e9/L    Absolute Monocytes 0.5 0.0 - 1.3 10e9/L    Absolute Eosinophils 0.0 0.0 - 0.7 10e9/L    Absolute Basophils 0.0 0.0 - 0.2 10e9/L    Abs Immature Granulocytes 0.0 0 - 0.4 10e9/L    Absolute Nucleated RBC 0.0    Lipase   Result Value Ref Range    Lipase 75 73 - 393 U/L   Bilirubin direct   Result Value Ref Range    Bilirubin Direct 0.2 0.0 - 0.2 mg/dL   Surgery General IP Consult: Patient to be seen: Routine - within 24 hours; biliary colic, EUS and ERCP tonight, likely cholecystectomy tomorrow; Consultant may enter orders: Yes    Narrative    Ashwin Kemp MD     2/10/2017 10:18 AM  [facility]     Hospital Consultation     Krista Antonio MRN# [id number]   YOB: 1945 Age: 71 year old     Primary care provider: [facility], [place]    ASSESSMENT:   Krista Antonio is an 71 year old year old male who I was   asked to see by Jenn Rausch for common bile duct sludge.    Successful ERCP and sphincterotomy yesterday. Recommended   cholecystectomy by the gastroenterologist following this to   prevent any further episodes.    RECOMMENDATIONS:   We discussed this. I think that he is a candidate for surgery   today and I expect this to be a same-day procedure with him able   to discharge home postoperatively. We discussed the perioperative   expectations including expected postoperative pain and we   discussed the restrictions after  surgery. We'll move forward with   surgery scheduled for noon today.    Ashwin Kemp MD  [phone] (m)  Click here to send text page.     This note was created using voice recognition software.   Undetected word substitutions or other errors may have occurred.      HPI:    Krista Antonio is a 71 year old male who I was asked to see   following his ERCP yesterday. He is a generally healthy male who   presented to the emergency room with complaints of 4 days of   intense constant epigastric pain which was not radiating. It was   10/10. There was no nausea or vomiting. There were no fevers or   chills. There was no jaundice or pancreatitis in the past that he   knows of. His LFTs are quite elevated and gastroenterology was   called. They took him for an ERCP with sphincterotomy yesterday.   He now feels much better. Surgery was recommended to avoid   further problems.          PAST MEDICAL HISTORY:   History reviewed. No pertinent past medical history.     PAST SURGICAL HISTORY:   History reviewed. No pertinent past surgical history.    SOCIAL HISTORY:     Social History     Social History     Marital Status:      Spouse Name: N/A     Number of Children: N/A     Years of Education: N/A     Social History Main Topics     Smoking status: Never Smoker      Smokeless tobacco: None     Alcohol Use: None     Drug Use: None     Sexual Activity: Not Asked     Other Topics Concern     None     Social History Narrative     None        FAMILY HISTORY:   History reviewed. No pertinent family history.    MEDICATIONS:     Current Outpatient Prescriptions   Medication Sig Dispense Refill     ASPIRIN EC PO Take 81 mg by mouth every evening        metFORMIN (GLUCOPHAGE-XR) 500 MG 24 hr tablet Take 1,000 mg by   mouth daily (with dinner)        Rosuvastatin Calcium (CRESTOR PO) Take 10 mg by mouth every   evening        Coenzyme Q10 (COQ10 PO) Take 1 tablet by mouth every evening          multivitamin,  "therapeutic (THERA-VIT) TABS tablet Take 1 tablet   by mouth every evening           ALLERGIES:     Allergies   Allergen Reactions     Atorvastatin      PN: arthralgias     Simvastatin      PN: arthralgias     Sulfa Drugs Rash        REVIEW OF SYSTEMS:   10 point ROS neg other than the symptoms noted above in the HPI   or here.      PHYSICAL EXAM:   /81 mmHg  Pulse 71  Temp(Src) 96.6  F (35.9  C) (Oral)    Resp 18  Ht 1.753 m (5' 9\")  Wt 78.472 kg (173 lb)  BMI 25.54   kg/m2  SpO2 95% Estimated body mass index is 25.54 kg/(m^2) as   calculated from the following:    Height as of this encounter: 1.753 m (5' 9\").    Weight as of this encounter: 78.472 kg (173 lb).   Constitutional: No acute distress  Eyes: Sclerae anicteric, moist conjunctivae; no lid-lag; PERRLA  ENT: Atraumatic; oropharynx clear with moist mucous membranes and   no mucosal ulcerations; normal hard and soft palate  Neck: Supple neck without lymphadenopathy, no thyromegaly  Respiratory: Clear to auscultation bilaterally with normal   respiratory effort  Cardiovascular: Regular rate and rhythm, no MRGs  GI: Soft, nontender, nondistended; no masses or HSM  Lymph/Hematologic: No pretibial edema  Genitourinary: Deferred  Skin: Normal temperature, turgor and texture; no rash, ulcers or   subcutaneous nodules  Musculoskeletal: Grossly symmetric and normal muscle bulk for age   in all extremities; gait and station normal; no clubbing or   cyanosis  Neurologic: CN II-XII grossly intact without deficits; sensation   intact to light touch over all extremities  Neuropsychiatric: Appropriate affect, alert and oriented to   person, place and time    LABORATORY AND IMAGING:      Results for orders placed or performed during the hospital   encounter of 02/09/17   ENDOSCOPIC RETROGRADE CHOLANGIOPANCREATOGRAPHY   Result Value Ref Range    ERCP       [facility]  __________________________________________________________________  _____________  Patient Name: Krista Antonio          Procedure Date: 2/9/2017   3:56 PM  MRN: [id number]                       Account Number: [id number]  YOB: 1945              Admit Type: Inpatient  Age: 71                               Gender: Male  Attending MD: Jelly Carlton MD   Total Sedation Time:   Instrument Name: 154                    __________________________________________________________________  _____________     Procedure:                ERCP  Indications:              Suspected bile duct stone(s)  Providers:                Jelly Carlton MD (Doctor)  Referring MD:               Medicines:                General Anesthesia, Indomethacin 100 mg   IN, Fortaz                             1 g  Complications:            No immediate complications.  __________________________________________________________________  _____________  Procedure:                Pre-Anesth esia Assessment:                            - Prior to the procedure, a History and   Physical                             was performed, and patient medications   and                             allergies were reviewed. The patient is   competent.                             The risks and benefits of the procedure   and the                             sedation options and risks were   discussed with the                             patient. All questions were answered   and informed                             consent was obtained. Patient   identification and                             proposed procedure were verified by the   physician                             in the procedure room. Mental Status   Examination:                             alert and oriented. Airway Examination:   normal                             oropharyngeal airway and neck mobility.   Respiratory                             Examination: clear to auscultation. CV    Examination:                             normal. Prophylactic Antibio tics: The   patient                             requires prophylactic antibiotics.   Prior                             Anticoagulants: The patient has taken   aspirin. ASA                             Grade Assessment: III - A patient with   severe                             systemic disease. After reviewing the   risks and                             benefits, the patient was deemed in   satisfactory                             condition to undergo the procedure. The   anesthesia                             plan was to use general anesthesia.   Immediately                             prior to administration of medications,   the patient                             was re-assessed for adequacy to receive   sedatives.                             The heart rate, respiratory rate,   oxygen                             saturations, blood pressure, adequacy   of pulmonary                             ventilation, and response to care were   monitored                             throughout the procedure. The physical   s tatus of                             the patient was re-assessed after the   procedure.                            - Immediately prior to administration   of                             medications, the patient was   re-assessed for                             adequacy to receive sedatives.                            - The heart rate, respiratory rate,   oxygen                             saturations, blood pressure, adequacy   of pulmonary                             ventilation, and response to care were   monitored                             throughout the procedure.                            - The physical status of the patient   was                             re-assessed after the procedure.                            After obtaining informed consent, the   scope was                             passed under direct vision.  Throughout   the                             procedure, the patient's blood   pressure, pulse, and                             oxygen saturations were monitored   continuously. The                              Olympus Duodenoscope Model #[id number],   [id number]                             was introduced through the mouth, and   used to                             inject contrast into and used to inject   contrast                             into the bile duct. The ERCP was   accomplished with                             ease. The patient tolerated the   procedure well.                                                                                     Findings:       The major papilla was normal. The minor papilla was not   seen. A long        0.035 inch Soft Jagwire was easily selectively passed into   the biliary        tree. The short-nosed traction sphincterotome was passed   over the        guidewire and the bile duct was then deeply cannulated.   Contrast was        injected. I personally interpreted the bile duct images.   Ductal flow of        contrast was adequate. Image quality was excellent. Contrast   extended to        the gallbladder. Contrast extended to the hepatic ducts .   The main bile        duct was normal. A 6 mm biliary sphincterotomy was made with   a        monofilament traction (standard) sphincterotome using ERBE        electrocautery. There was self limited oozing from the   sphincterotomy        which did not require treatment. The biliary tree was swept   with an 8.5        mm balloon starting at the biliary pancreatic junction, left   main        hepatic duct and right main hepatic duct. Nothing was found.   The        endoscope was withdrawn from the patient.                                                                                     Impression:               - Gallbladdder sludge, post ERCP and   sphincterotomy.  Recommendation:           - [facility] to [facility] for    ongoing care.                            - Clear liquid diet for 1 day.                            - Observe patient's clinical course.                                                                                       PEDRO Carlton MD  _____________________  Jelly de souza MD  2/9/2017 4:49 PM  I was physically present for the entire viewing portion of the   exam.  __________________________Jelly Carlton MD  Number of Addenda: 0    Note Initiated On: 2/9/2017 3:56 PM  MRN:                      [id number]  Procedure Date:           2/9/2017 3:56:30 PM  Scope Withdrawal Time: 0 hours 0 minutes 0 seconds   Total Procedure Duration: 0 hours 8 minutes 40 seconds   Estimated Blood Loss:       minimal  Scope In: 4:35:01 PM  Scope Out: 4:43:41 PM     UPPER EUS   Result Value Ref Range    Upper EUS       [facility]  __________________________________________________________________  _____________  Patient Name: Krista Antonio          Procedure Date: 2/9/2017   3:54 PM  MRN: [id number]                       Account Number: [id number]  YOB: 1945              Admit Type: Inpatient  Age: 71                               Gender: Male  Attending MD: Jelly Carlton MD   Total Sedation Time:   Instrument Name: 114                    __________________________________________________________________  _____________     Procedure:                Upper EUS  Indications:              Suspected choledocholithiasis  Providers:                Jelly Carlton MD (Doctor)  Referring MD:               Medicines:                General Anesthesia  Complications:            No immediate complications.  __________________________________________________________________  _____________  Procedure:                Pre-Anesthesia Assessment:                            - Prior to the  procedure, a History   and Physical                             was performed, and patient medications   and                              allergies were reviewed. The patient is   competent.                             The risks and benefits of the procedure   and the                             sedation options and risks were   discussed with the                             patient. All questions were answered   and informed                             consent was obtained. Patient   identification and                             proposed procedure were verified by the   physician                             in the procedure room. Mental Status   Examination:                             alert and oriented. Airway Examination:   normal                             oropharyngeal airway and neck mobility.   Respiratory                             Examination: clear to auscultation. CV   Examination:                             normal. Prophylactic Antibiotics: The   patient does                             not req uire prophylactic antibiotics.   Prior                             Anticoagulants: The patient has taken   aspirin. ASA                             Grade Assessment: III - A patient with   severe                             systemic disease. After reviewing the   risks and                             benefits, the patient was deemed in   satisfactory                             condition to undergo the procedure. The   anesthesia                             plan was to use general anesthesia.   Immediately                             prior to administration of medications,   the patient                             was re-assessed for adequacy to receive   sedatives.                             The heart rate, respiratory rate,   oxygen                             saturations, blood pressure, adequacy   of pulmonary                             ventilation, and response to care were   monitored                             throughout the procedure. The physical   status of                             the patient w as re-assessed  after the   procedure.                            - Immediately prior to administration   of                             medications, the patient was   re-assessed for                             adequacy to receive sedatives.                            - The heart rate, respiratory rate,   oxygen                             saturations, blood pressure, adequacy   of pulmonary                             ventilation, and response to care were   monitored                             throughout the procedure.                            - The physical status of the patient   was                             re-assessed after the procedure.                            After obtaining informed consent, the   endoscope was                             passed under direct vision. Throughout   the                             procedure, the patient's blood   pressure, pulse, and                             oxygen saturations were monitored   continuously. The                             Olympus EUS Linea r Scope Model   [id number], [place]                             #114, SN#[id number] was introduced through   the mouth,                             and advanced to the second part of   duodenum. The                             upper EUS was accomplished with ease.   The patient                             tolerated the procedure well.                                                                                     Findings:       Endoscopic Finding :       The ampulla and examined duodenum were normal.       Endosonographic Finding :       Moderate hyperechoic material consistent with sludge was   visualized        endosonographically in the gallbladder body.       CBD was 4 mm. Unable to exlude small amount of sludge in   proximal CBD       There was no sign of significant endosonographic abnormality   in the        pancreatic head, in the pancreatic body, in the pancreatic   tail, in the        main pancreatic duct and in the  ampulla. The pancreas was   well        visualized, no pathologic lymphadenopath y, no masses, no   cysts, no        calcifications, the pancreatic duct was well visualized from   ampulla to        tail, the pancreatic duct was thin in caliber, the   pancreatic duct was        regular in contour. PD was 2.6 mm in the head and 1.9 mm in   the body       Normal celiac axis and left adrenal gland. No worrisome   mediastinal        nodes.                                                                                     Impression:               - Gallbladder sludge, cannot exclude   small amount                             of sludge in proximal CBD  Recommendation:           - Perform an ERCP today.                                                                                       PEDRO Carlton MD  _____________________  Jelly Carlton MD  2/9/2017 4:53 PM  I was physically present for the entire viewing portion of the   exam.  __________________________Jelly Carlton MD  Number of Addenda: 0    Note Initiated On: 2/9/2017 3:54 PM  MRN:                      [id number]  Procedu re Date:           2/9/2017 3:54:17 PM  Scope Withdrawal Time: 0 hours 0 minutes 0 seconds   Total Procedure Duration: 0 hours 6 minutes 23 seconds   Estimated Blood Loss:       none  Scope In: 4:20:43 PM  Scope Out: 4:27:06 PM     US Abdomen Limited    Narrative    ULTRASOUND ABDOMEN LIMITED 2/9/2017 10:05 AM     HISTORY: Right upper quadrant pain.    FINDINGS: Multiple small mobile echogenic foci are noted within   the  gallbladder which may represent nonshadowing stones. The   gallbladder  wall was mildly thickened measuring 0.4 cm. The common bile duct   was  prominent but likely normal for age. Increased hepatic   echotexture  suggests fatty infiltration. The right kidney and visualized   portion  of the pancreas appear within normal limits.      Impression    IMPRESSION: Multiple small gallstones. Nonspecific gallbladder    wall  thickening. Probable hepatic fatty infiltration.    SAIDA DAS MD   XR ERCP    Narrative    This exam was marked as non-reportable because it will not be   read by a   radiologist or a [place] non-radiologist provider.             CBC with platelets differential   Result Value Ref Range    WBC 5.0 4.0 - 11.0 10e9/L    RBC Count 4.82 4.4 - 5.9 10e12/L    Hemoglobin 14.5 13.3 - 17.7 g/dL    Hematocrit 42.5 40.0 - 53.0 %    MCV 88 78 - 100 fl    MCH 30.1 26.5 - 33.0 pg    MCHC 34.1 31.5 - 36.5 g/dL    RDW 12.9 10.0 - 15.0 %    Platelet Count 250 150 - 450 10e9/L    Diff Method Automated Method     % Neutrophils 68.9 %    % Lymphocytes 19.9 %    % Monocytes 8.6 %    % Eosinophils 1.8 %    % Basophils 0.4 %    % Immature Granulocytes 0.4 %    Nucleated RBCs 0 0 /100    Absolute Neutrophil 3.5 1.6 - 8.3 10e9/L    Absolute Lymphocytes 1.0 0.8 - 5.3 10e9/L    Absolute Monocytes 0.4 0.0 - 1.3 10e9/L    Absolute Eosinophils 0.1 0.0 - 0.7 10e9/L    Absolute Basophils 0.0 0.0 - 0.2 10e9/L    Abs Immature Granulocytes 0.0 0 - 0.4 10e9/L    Absolute Nucleated RBC 0.0    Comprehensive metabolic panel   Result Value Ref Range    Sodium 137 133 - 144 mmol/L    Potassium 3.9 3.4 - 5.3 mmol/L    Chloride 102 94 - 109 mmol/L    Carbon Dioxide 24 20 - 32 mmol/L    Anion Gap 11 3 - 14 mmol/L    Glucose 123 (H) 70 - 99 mg/dL    Urea Nitrogen 15 7 - 30 mg/dL    Creatinine 0.70 0.66 - 1.25 mg/dL    GFR Estimate >90  Non  GFR Calc   >60 mL/min/1.7m2    GFR Estimate If Black >90   GFR Calc   >60 mL/min/1.7m2    Calcium 8.3 (L) 8.5 - 10.1 mg/dL    Bilirubin Total 1.3 0.2 - 1.3 mg/dL    Albumin 3.6 3.4 - 5.0 g/dL    Protein Total 6.9 6.8 - 8.8 g/dL    Alkaline Phosphatase 170 (H) 40 - 150 U/L    ALT 1120 (HH) 0 - 70 U/L     (HH) 0 - 45 U/L   Lipase   Result Value Ref Range    Lipase 206 73 - 393 U/L   Troponin I   Result Value Ref Range    Troponin I ES  0.000 - 0.045 ug/L     <0.015  The 99th  percentile for upper reference range is 0.045 ug/L.    Troponin values in   the range of 0.045 - 0.120 ug/L may be associated with risks of   adverse   clinical events.     INR   Result Value Ref Range    INR 0.94 0.86 - 1.14   Glucose by meter   Result Value Ref Range    Glucose 113 (H) 70 - 99 mg/dL   Glucose by meter   Result Value Ref Range    Glucose 127 (H) 70 - 99 mg/dL   Comprehensive metabolic panel   Result Value Ref Range    Sodium 139 133 - 144 mmol/L    Potassium 4.2 3.4 - 5.3 mmol/L    Chloride 104 94 - 109 mmol/L    Carbon Dioxide 23 20 - 32 mmol/L    Anion Gap 12 3 - 14 mmol/L    Glucose 121 (H) 70 - 99 mg/dL    Urea Nitrogen 13 7 - 30 mg/dL    Creatinine 0.71 0.66 - 1.25 mg/dL    GFR Estimate >90 >60 mL/min/1.7m2    GFR Estimate If Black >90 >60 mL/min/1.7m2    Calcium 8.2 (L) 8.5 - 10.1 mg/dL    Bilirubin Total 0.8 0.2 - 1.3 mg/dL    Albumin 3.5 3.4 - 5.0 g/dL    Protein Total 6.8 6.8 - 8.8 g/dL    Alkaline Phosphatase 172 (H) 40 - 150 U/L     (HH) 0 - 70 U/L     (H) 0 - 45 U/L   CBC with platelets differential   Result Value Ref Range    WBC 7.5 4.0 - 11.0 10e9/L    RBC Count 4.78 4.4 - 5.9 10e12/L    Hemoglobin 14.3 13.3 - 17.7 g/dL    Hematocrit 42.4 40.0 - 53.0 %    MCV 89 78 - 100 fl    MCH 29.9 26.5 - 33.0 pg    MCHC 33.7 31.5 - 36.5 g/dL    RDW 13.1 10.0 - 15.0 %    Platelet Count 267 150 - 450 10e9/L    Diff Method Automated Method     % Neutrophils 81.8 %    % Lymphocytes 11.0 %    % Monocytes 6.5 %    % Eosinophils 0.0 %    % Basophils 0.3 %    % Immature Granulocytes 0.4 %    Nucleated RBCs 0 0 /100    Absolute Neutrophil 6.2 1.6 - 8.3 10e9/L    Absolute Lymphocytes 0.8 0.8 - 5.3 10e9/L    Absolute Monocytes 0.5 0.0 - 1.3 10e9/L    Absolute Eosinophils 0.0 0.0 - 0.7 10e9/L    Absolute Basophils 0.0 0.0 - 0.2 10e9/L    Abs Immature Granulocytes 0.0 0 - 0.4 10e9/L    Absolute Nucleated RBC 0.0    Lipase   Result Value Ref Range    Lipase 75 73 - 393 U/L   Bilirubin direct    Result Value Ref Range    Bilirubin Direct 0.2 0.0 - 0.2 mg/dL     I personally reviewed and interpreted the ultrasound. The   gallbladder has a small amount of echogenic material in it. I do   not see a measurement of the wall that is 4 mm as reported.    30 minutes were spent in this encounter, over 50% in counseling   and coordination of care.                          Treatments: IV hydration and surgery: EUS, ERCP w/ sphincterotomy, laparoscopic cholecystectomy    Discharge Exam:    B/P: 143/82, T: 98.3, P: 71, R: 15    GENERAL:  Comfortable.  PSYCH: pleasant, oriented, No acute distress.  HEENT:  Atraumatic, normocephalic. PERRLA. Normal conjunctiva, normal hearing, and oropharynx is normal.  NECK:  Supple, no neck vein distention, adenopathy or bruits, normal thyroid.  HEART:  Normal S1, S2 with no murmur, no pericardial rub, gallops or S3 or S4.  LUNGS:  Clear to auscultation, normal respiratory effort. No wheezing, rales or ronchi.  GI:  Soft, no hepatosplenomegaly, normal bowel sounds. Non-tender, non distended.   EXTREMITIES:  No pedal edema, +2 pulses bilateral and equal.  SKIN:  Dry to touch, No rash, wound or ulcerations.  NEUROLOGIC:  CN 2-12 intact, BL 5/5 symmetric upper and lower extremity strength, sensation is intact with no focal deficits.     Pending Studies:    Unresulted Labs Ordered in the Past 30 Days of this Admission     Date and Time Order Name Status Description    2/10/2017 1316 Surgical pathology exam In process            Disposition: home    Patient Instructions:        Review of your medicines      START taking       Dose / Directions    oxyCODONE-acetaminophen 5-325 MG per tablet   Commonly known as:  PERCOCET   Used for:  Cholelithiasis with choledocholithiasis        Dose:  1-2 tablet   Take 1-2 tablets by mouth every 4 hours as needed for pain (moderate to severe)   Quantity:  25 tablet   Refills:  0         CONTINUE these medicines which have NOT CHANGED       Dose /  Directions    ASPIRIN EC PO        Dose:  81 mg   Take 81 mg by mouth every evening   Refills:  0       COQ10 PO        Dose:  1 tablet   Take 1 tablet by mouth every evening   Refills:  0       CRESTOR PO        Dose:  10 mg   Take 10 mg by mouth every evening   Refills:  0       metFORMIN 500 MG 24 hr tablet   Commonly known as:  GLUCOPHAGE-XR        Dose:  1000 mg   Take 1,000 mg by mouth daily (with dinner)   Refills:  0       multivitamin, therapeutic Tabs tablet        Dose:  1 tablet   Take 1 tablet by mouth every evening   Refills:  0            Where to get your medicines      Some of these will need a paper prescription and others can be bought over the counter. Ask your nurse if you have questions.     Bring a paper prescription for each of these medications      oxyCODONE-acetaminophen 5-325 MG per tablet                 Activity: activity as tolerated    Active Diet Order      Diet  Wound Care: keep wound clean and dry and ice to area for comfort    Follow-up with general surgery per their recommendations for post-op evaluation.    Signed:  Jenn Rausch PA-C

## 2017-02-13 LAB — COPATH REPORT: NORMAL

## 2017-02-23 ENCOUNTER — OFFICE VISIT (OUTPATIENT)
Dept: SURGERY | Facility: CLINIC | Age: 72
End: 2017-02-23
Payer: COMMERCIAL

## 2017-02-23 DIAGNOSIS — Z09 SURGICAL FOLLOWUP VISIT: Primary | ICD-10-CM

## 2017-02-23 PROCEDURE — 99024 POSTOP FOLLOW-UP VISIT: CPT | Performed by: PHYSICIAN ASSISTANT

## 2017-02-23 NOTE — LETTER
2017    [facility]      RE:  Krista Antonio-:  47    Subjective:  Krista Antonio is here for his first postoperative visit. He underwent a laparoscopic cholecystectomy by Dr. Kemp on 2/10/17. Today he tells me he has been feeling well since surgery. He currently does not require narcotic pain medications, he is eating a normal diet and his bowels are regular. He has no concerns today.     Objective:  Abd - soft, non-tender, non-distended, +bowel sounds, no masses or organomegaly   Inc - c/d/i, no erythema, +healing ridge, no masses     Assessment:  S/p laparoscopic cholecystectomy. The pathology confirms chronic cholecystitis and cholelithiasis.     Plan:  RTC PRN        Cecilio Ness PA-C  2017

## 2017-02-23 NOTE — MR AVS SNAPSHOT
"              After Visit Summary   2017    Krista Antonio    MRN: [id number]           Patient Information     Date Of Birth          1945        Visit Information        Provider Department      2017 10:30 AM Cecilio Ness PA-C [facility] General Surgery      Today's Diagnoses     Surgical followup visit    -  1       Follow-ups after your visit        Follow-up notes from your care team     Return if symptoms worsen or fail to improve.      Who to contact     If you have questions or need follow up information about today's clinic visit or your schedule please contact [facility] directly at [phone].  Normal or non-critical lab and imaging results will be communicated to you by Tauntrhart, letter or phone within 4 business days after the clinic has received the results. If you do not hear from us within 7 days, please contact the clinic through Tauntrhart or phone. If you have a critical or abnormal lab result, we will notify you by phone as soon as possible.  Submit refill requests through [facility] or call your pharmacy and they will forward the refill request to us. Please allow 3 business days for your refill to be completed.          Additional Information About Your Visit        MyChart Information     [facility] lets you send messages to your doctor, view your test results, renew your prescriptions, schedule appointments and more. To sign up, go to [email or web address] . Click on \"Log in\" on the left side of the screen, which will take you to the Welcome page. Then click on \"Sign up Now\" on the right side of the page.     You will be asked to enter the access code listed below, as well as some personal information. Please follow the directions to create your username and password.     Your access code is: [email or web address]  Expires: 2017  2:06 PM     Your access code will  in 90 days. If you need help " or a new code, please call your [place] clinic or [phone].        Care EveryWhere ID     This is your Care EveryWhere ID. This could be used by other organizations to access your [place] medical records  [id number]         Blood Pressure from Last 3 Encounters:   02/10/17 143/82    Weight from Last 3 Encounters:   02/10/17 172 lb (78 kg)              Today, you had the following     No orders found for display       Primary Care Provider Office Phone # Fax #    [facility] [phone] [address] [place] [address]        Thank you!     Thank you for choosing [facility]  for your care. Our goal is always to provide you with excellent care. Hearing back from our patients is one way we can continue to improve our services. Please take a few minutes to complete the written survey that you may receive in the mail after your visit with us. Thank you!             Your Updated Medication List - Protect others around you: Learn how to safely use, store and throw away your medicines at www.disposemymeds.org.          This list is accurate as of: 2/23/17 11:04 AM.  Always use your most recent med list.                   Brand Name Dispense Instructions for use    ASPIRIN EC PO      Take 81 mg by mouth every evening       COQ10 PO      Take 1 tablet by mouth every evening       CRESTOR PO      Take 10 mg by mouth every evening       metFORMIN 500 MG 24 hr tablet    GLUCOPHAGE-XR     Take 1,000 mg by mouth daily (with dinner)       multivitamin, therapeutic Tabs tablet      Take 1 tablet by mouth every evening       oxyCODONE-acetaminophen 5-325 MG per tablet    PERCOCET    25 tablet    Take 1-2 tablets by mouth every 4 hours as needed for pain (moderate to severe)

## 2019-11-11 NOTE — TELEPHONE ENCOUNTER
RECORDS STATUS - ALL OTHER DIAGNOSIS      RECORDS RECEIVED FROM: Epic/   DATE RECEIVED: 11/13/2019    NOTES STATUS DETAILS   OFFICE NOTE from referring provider Complete  Dr. Stephanie Lyn  ([facility])    OFFICE NOTE from medical oncologist N/A    DISCHARGE SUMMARY from hospital N/A    DISCHARGE REPORT from the ER N/A    OPERATIVE REPORT N/A    MEDICATION LIST Complete [facility]   CLINICAL TRIAL TREATMENTS TO DATE N/A    LABS     PATHOLOGY REPORTS Complete-Bx slides arrived on 11/12/2019 and sent to the 5th floor path lab, Case: [id number] [facility] 10/28/2019   Tracking Number: [id number]   ANYTHING RELATED TO DIAGNOSIS     GENONOMIC TESTING     TYPE:     IMAGING (NEED IMAGES & REPORT)     CT SCANS Complete PACS   MRI Complete PACS   NM Bone Scan  Complete  PACS   Xray Lumber  Complete  PACS   MAMMO     ULTRASOUND     PET       Action    Action Taken 11/11/2019 1:50pm     I called [facility] and requested IMG to be pushed to PACS    [facility]

## 2019-11-11 NOTE — TELEPHONE ENCOUNTER
ONCOLOGY INTAKE: Records Information      APPT INFORMATION:  Referring provider:  Dr. Stephanie Lyn  Referring provider s clinic:  [facility]  Reason for visit/diagnosis:  prostate cancer  Has patient been notified of appointment date and time?: yes    RECORDS INFORMATION:  Were the records received with the referral (via Rightfax)? no    Has patient been seen for any external appt for this diagnosis? yes    If yes, where? [facility]    Has patient had any imaging or procedures outside of [facility] for this condition? yes      If Yes, where? [facility]    ADDITIONAL INFORMATION:

## 2019-11-12 ENCOUNTER — PATIENT OUTREACH (OUTPATIENT)
Dept: ONCOLOGY | Facility: CLINIC | Age: 74
End: 2019-11-12

## 2019-11-12 NOTE — PROGRESS NOTES
S-(situation): Chart prep for consult with Spencer Kinsey MD. Request Radiology push images.    B-(background): Prostate Cancer.     A-(assessment): Contacted [facility] to request radiology push images.    R-(recommendations): Dede at [facility] stated she will work on this immediately. RNCC contacted Marely at [facility] hub to accept images for consult 11/13/2019. Marely stated they have not appeared in queue but will watch for them and push them through.    Melissa Borges, BSN, RN, OCN  RN Cancer Care Coordinator  [facility]  [phone]

## 2019-11-13 ENCOUNTER — HOSPITAL ENCOUNTER (OUTPATIENT)
Facility: CLINIC | Age: 74
Setting detail: SPECIMEN
End: 2019-11-13
Attending: UROLOGY
Payer: COMMERCIAL

## 2019-11-13 ENCOUNTER — PRE VISIT (OUTPATIENT)
Dept: ONCOLOGY | Facility: CLINIC | Age: 74
End: 2019-11-13

## 2019-11-13 ENCOUNTER — ONCOLOGY VISIT (OUTPATIENT)
Dept: ONCOLOGY | Facility: CLINIC | Age: 74
End: 2019-11-13
Payer: COMMERCIAL

## 2019-11-13 VITALS
OXYGEN SATURATION: 97 % | WEIGHT: 176 LBS | TEMPERATURE: 97.7 F | RESPIRATION RATE: 15 BRPM | DIASTOLIC BLOOD PRESSURE: 94 MMHG | HEART RATE: 97 BPM | SYSTOLIC BLOOD PRESSURE: 154 MMHG | HEIGHT: 68 IN | BODY MASS INDEX: 26.67 KG/M2

## 2019-11-13 DIAGNOSIS — C61 PROSTATE CANCER (H): Primary | ICD-10-CM

## 2019-11-13 PROCEDURE — 99204 OFFICE O/P NEW MOD 45 MIN: CPT | Performed by: UROLOGY

## 2019-11-13 PROCEDURE — G0463 HOSPITAL OUTPT CLINIC VISIT: HCPCS

## 2019-11-13 RX ORDER — CEFAZOLIN SODIUM 1 G
1 VIAL (EA) INJECTION SEE ADMIN INSTRUCTIONS
Status: CANCELLED | OUTPATIENT
Start: 2019-11-13

## 2019-11-13 RX ORDER — HEPARIN SODIUM 10000 [USP'U]/ML
5000 INJECTION, SOLUTION INTRAVENOUS; SUBCUTANEOUS
Status: CANCELLED | OUTPATIENT
Start: 2019-11-13

## 2019-11-13 RX ORDER — LATANOPROST 50 UG/ML
SOLUTION/ DROPS OPHTHALMIC
Refills: 10 | COMMUNITY
Start: 2019-11-06

## 2019-11-13 ASSESSMENT — PAIN SCALES - GENERAL: PAINLEVEL: NO PAIN (0)

## 2019-11-13 ASSESSMENT — MIFFLIN-ST. JEOR: SCORE: 1519.58

## 2019-11-13 NOTE — LETTER
11/13/2019         RE: Krista Antonio  [address] [address] [address]        Dear Colleague,    Thank you for referring your patient, Krista Antonio, to the [facility]. Please see a copy of my visit note below.          Chief Complaint:    Prostate Cancer         Consult or Referral:     Mr. Krista Antonio is a 74 year old male seen at the request of Dr. Lyn.         History of Present Illness:   Krista Antonio is a very pleasant 74 year old male who presents with a history of Prostate Cancer. PSA rise to 6.2. Ultimately underwent MRI to assess for lesions and found to have PIRADS 4 lesion. Biopsy completed demonstrating Jimbo 4+4=8 prostate cancer. No significant urinary symptoms at baseline. No hematuria or dysuria. Has some erectile dysfunction at baseline.  Family history positive for prostate cancer in his brother.    Fusion Biopsy 10/23/2019  FINAL DIAGNOSIS:   CASE FROM [place], [place] ([id number], OBTAINED   10/23/2019):   A. Prostate, right apex, needle core biopsy:   - Prostatic adenocarcinoma, acinar type   - [place] score 6 (3+3)   - Grade group 1   - Extent: Involves 20% of the tissue     B. Prostate, right middle, needle core biopsy:   - Prostatic adenocarcinoma, acinar type   - [place] score 7 (3+4; 30% pattern 4)   - Grade group 2   - Extent: Involves 40% of the tissue     C. Prostate, right base, needle core biopsy:   - Prostatic adenocarcinoma, acinar type   - Jimbo score 6 (3+3)   - Grade group 1   - Extent: Involves < 5% of the tissue     D. Prostate, left apex, needle core biopsy:   - Focal prostatic adenocarcinoma, acinar type   - Jimbo score 8 (4+4)   - Grade group 4   - Extent: Involves < 5% of the tissue     E. Prostate, left middle, needle core biopsy:   - Prostatic adenocarcinoma, acinar type   - [place] score 8 (4+4)   - Grade group 4   - Extent: Involves 40% of the tissue     F. Prostate, left base, needle core  biopsy:   - Benign prostate tissue     G. Prostate, index lesion, needle core biopsy:   - Prostatic adenocarcinoma, acinar type   - Jimbo score 7 (4+3)   - Grade group 3   - Extent: Involves 70% of the tissue           Past Medical History:   Type II DM  HLD  HTN  Prostate Cancer         Past Surgical History:     Past Surgical History:   Procedure Laterality Date     ENDOSCOPIC RETROGRADE CHOLANGIOPANCREATOGRAM N/A 2/9/2017    Procedure: ENDOSCOPIC RETROGRADE CHOLANGIOPANCREATOGRAM;  Surgeon: Jelly Carlton MD;  Location:  OR      UGI ENDOSCOPY W EUS N/A 2/9/2017    Procedure: COMBINED ENDOSCOPIC ULTRASOUND, ESOPHAGOSCOPY, GASTROSCOPY, DUODENOSCOPY (EGD);  Surgeon: Jelly Carlton MD;  Location:  OR     LAPAROSCOPIC CHOLECYSTECTOMY N/A 2/10/2017    Procedure: LAPAROSCOPIC CHOLECYSTECTOMY;  Surgeon: Ashwin Kemp MD;  Location:  OR          Medications     Current Outpatient Medications   Medication     ASPIRIN EC PO     Coenzyme Q10 (COQ10 PO)     metFORMIN (GLUCOPHAGE-XR) 500 MG 24 hr tablet     multivitamin, therapeutic (THERA-VIT) TABS tablet     Rosuvastatin Calcium (CRESTOR PO)     latanoprost (XALATAN) 0.005 % ophthalmic solution     oxyCODONE-acetaminophen (PERCOCET) 5-325 MG per tablet     No current facility-administered medications for this visit.           Family History:   Brother had CaP - had RALP         Social History:     Social History     Socioeconomic History     Marital status:      Spouse name: Not on file     Number of children: Not on file     Years of education: Not on file     Highest education level: Not on file   Occupational History     Not on file   Social Needs     Financial resource strain: Not on file     Food insecurity:     Worry: Not on file     Inability: Not on file     Transportation needs:     Medical: Not on file     Non-medical: Not on file   Tobacco Use     Smoking status: Never Smoker     Smokeless tobacco: Never Used  "  Substance and Sexual Activity     Alcohol use: Yes     Drug use: Never     Sexual activity: Not on file   Lifestyle     Physical activity:     Days per week: Not on file     Minutes per session: Not on file     Stress: Not on file   Relationships     Social connections:     Talks on phone: Not on file     Gets together: Not on file     Attends Yazidi service: Not on file     Active member of club or organization: Not on file     Attends meetings of clubs or organizations: Not on file     Relationship status: Not on file     Intimate partner violence:     Fear of current or ex partner: Not on file     Emotionally abused: Not on file     Physically abused: Not on file     Forced sexual activity: Not on file   Other Topics Concern     Parent/sibling w/ CABG, MI or angioplasty before 65F 55M? Not Asked   Social History Narrative     Not on file          Allergies:   Atorvastatin; Simvastatin; and Sulfa drugs         Review of Systems:  From intake questionnaire     Skin: negative  Eyes: negative  Ears/Nose/Throat: negative  Respiratory: No shortness of breath, dyspnea on exertion, cough, or hemoptysis  Cardiovascular: No chest pain or palpitations  Gastrointestinal: negative; no nausea/vomiting, constipation or diarrhea  Genitourinary: as per HPI  Musculoskeletal: negative  Neurologic: negative  Psychiatric: negative  Hematologic/Lymphatic/Immunologic: negative  Endocrine: negative         Physical Exam:     Patient is a 74 year old  male   Vitals: Blood pressure (!) 154/94, pulse 97, temperature 97.7  F (36.5  C), temperature source Tympanic, resp. rate 15, height 1.73 m (5' 8.11\"), weight 79.8 kg (176 lb), SpO2 97 %.  Constitutional: Body mass index is 26.67 kg/m .  Alert, no acute distress, oriented, conversant  Eyes: no scleral icterus; extraocular muscles intact, moist conjunctivae  Neck: trachea midline, no thyromegaly  Ears/nose/mouth: throat/mouth:normal, good dentition  Respiratory: no respiratory " distress, or pursed lip breathing  Cardiovascular: pulses strong and intact; no obvious jugular venous distension present  Gastrointestinal: soft, nontender, no organomegaly or masses,   Musculoskeletal: extremities normal, no peripheral edema  Skin: no suspicious lesions or rashes  Neuro: Alert, oriented, speech and mentation normal  Psych: affect and mood normal, alert and oriented to person, place and time  Gait: Normal      Labs and Pathology:    I reviewed all applicable laboratory and pathology data and went over findings with patient  Significant for   Lab Results   Component Value Date    CR 0.71 02/10/2017    CR 0.70 02/09/2017       Imaging:    I directly visualized and reviewed all applicable imaging and went over findings with patient.    MRI Prostate 9/4/2019  Size: 23 ml  IMPRESSION:  Images are moderately motion degraded, despite several attempts to reimage.  1. A suspicious abnormality, this examination is characterized as PIRADS 4-high probability. Clinically significant cancer is likely to be present. There is a 1 cm focus of signal abnormality in the posterolateral left peripheral zone mid gland to apex.  2. No suspicious adenopathy or bony lesion.    NM Bone Scan 11/11/2019  IMPRESSION:    1. No convincing evidence for metastatic disease.    2. Multifocal radiotracer uptake as described most likely degenerative in etiology.     CT abd/pelvis 11/7/2019  IMPRESSION:      1. No evidence for metastatic disease.    2. Hepatic steatosis.    3. Degenerative changes in the lumbar spine    4. Total hip arthroplasty obscures some detail in the pelvis but no suspicious findings.    5. Iliac artery aneurysms with eccentric thrombus. One on the left measures 1.8 cm. 6. Minimal perirectal haziness could be from transrectal biopsies. See above.        Outside and Past Medical records:    I spent 10 minutes reviewing outside and past medical records.         Assessment and Plan:     Assessment: 74 year old male  with Jimbo 4+4=8 prostate cancer, PSA 6.2, PIRADS 4 on MRI. We had an extensive discussion about the significance of localized, prostate cancer. We discussed the options for treatment of a localized prostate cancer including active surveillance, brachytherapy, external beam radiation therapy, and surgical removal.      Furthermore, we discussed the relative merits of robotic assisted radical prostatectomy. We also discussed the advantages and disadvantages and roles of open surgery vs. laparoscopic (and [place] assisted) surgery. The anticipated post-operative course was explained, including an anticipated 1-2 day hospital stay. Catheter will remain in place 10-14 days.      We discussed that there was no clear evidence of advantage between surgery and radiation with regard to risk of recurrence. We discussed option for discussion with radiation oncology, but patient would prefer surgery.      The risks, benefits, alternatives, and personnel involved in the procedure were discussed. Specifically, we discussed that risks include but are not limited to anesthetic complications including stroke, MI, DVT/PE, as well as risk of bleeding, bowel injury, infection, lymphocele, urine leak and other potentially unforseen complications. Also discussed the risk of bladder neck contracture and other urinary symptoms after procedure. In addition, we discussed risk of urinary incontinence and erectile dysfunction following the procedure. Finally, we discussed risk for adverse pathologic features, including positive surgical margins and potential for post-surgical radiation, hormone ablation and long-term need for PSA monitoring,.  All questions were answered in detail.  A written informed consent will be finalized on the morning of the procedure.    Plan:  Schedule for robotic-assisted laparoscopic radical prostatectomy    Spencer Kinsey MD  Urology  [facility]        Again, thank you for allowing me to  participate in the care of your patient.        Sincerely,        Spencer Kinsey MD

## 2019-11-13 NOTE — NURSING NOTE
"Oncology Rooming Note    November 13, 2019 1:31 PM   Krista Antonio is a 74 year old male who presents for:    Chief Complaint   Patient presents with     Oncology Clinic Visit     New Patient consult     Initial Vitals: BP (!) 154/94   Pulse 97   Temp 97.7  F (36.5  C) (Tympanic)   Resp 15   Ht 1.73 m (5' 8.11\")   Wt 79.8 kg (176 lb)   SpO2 97%   BMI 26.67 kg/m   Estimated body mass index is 26.67 kg/m  as calculated from the following:    Height as of this encounter: 1.73 m (5' 8.11\").    Weight as of this encounter: 79.8 kg (176 lb). Body surface area is 1.96 meters squared.  No Pain (0) Comment: Data Unavailable   No LMP for male patient.  Allergies reviewed: Yes  Medications reviewed: Yes    Medications: Medication refills not needed today.  Pharmacy name entered into [facility]Pais[facility]: [id number] [place], [place] - [address] [address]    Clinical concerns: follow up       Ladi Flores CMA              "

## 2019-11-13 NOTE — PROGRESS NOTES
Chief Complaint:    Prostate Cancer         Consult or Referral:     Mr. Krista Antonio is a 74 year old male seen at the request of Dr. Lyn.         History of Present Illness:   Krista Antonio is a very pleasant 74 year old male who presents with a history of Prostate Cancer. PSA rise to 6.2. Ultimately underwent MRI to assess for lesions and found to have PIRADS 4 lesion. Biopsy completed demonstrating Jimbo 4+4=8 prostate cancer. No significant urinary symptoms at baseline. No hematuria or dysuria. Has some erectile dysfunction at baseline.  Family history positive for prostate cancer in his brother.    Fusion Biopsy 10/23/2019  FINAL DIAGNOSIS:   CASE FROM [place], [place] ([id number], OBTAINED   10/23/2019):   A. Prostate, right apex, needle core biopsy:   - Prostatic adenocarcinoma, acinar type   - [place] score 6 (3+3)   - Grade group 1   - Extent: Involves 20% of the tissue     B. Prostate, right middle, needle core biopsy:   - Prostatic adenocarcinoma, acinar type   - [place] score 7 (3+4; 30% pattern 4)   - Grade group 2   - Extent: Involves 40% of the tissue     C. Prostate, right base, needle core biopsy:   - Prostatic adenocarcinoma, acinar type   - [place] score 6 (3+3)   - Grade group 1   - Extent: Involves < 5% of the tissue     D. Prostate, left apex, needle core biopsy:   - Focal prostatic adenocarcinoma, acinar type   - [place] score 8 (4+4)   - Grade group 4   - Extent: Involves < 5% of the tissue     E. Prostate, left middle, needle core biopsy:   - Prostatic adenocarcinoma, acinar type   - [place] score 8 (4+4)   - Grade group 4   - Extent: Involves 40% of the tissue     F. Prostate, left base, needle core biopsy:   - Benign prostate tissue     G. Prostate, index lesion, needle core biopsy:   - Prostatic adenocarcinoma, acinar type   - [place] score 7 (4+3)   - Grade group 3   - Extent: Involves 70% of the tissue           Past Medical History:   Type  II DM  HLD  HTN  Prostate Cancer         Past Surgical History:     Past Surgical History:   Procedure Laterality Date     ENDOSCOPIC RETROGRADE CHOLANGIOPANCREATOGRAM N/A 2/9/2017    Procedure: ENDOSCOPIC RETROGRADE CHOLANGIOPANCREATOGRAM;  Surgeon: Jelly Carlton MD;  Location:  OR      UGI ENDOSCOPY W EUS N/A 2/9/2017    Procedure: COMBINED ENDOSCOPIC ULTRASOUND, ESOPHAGOSCOPY, GASTROSCOPY, DUODENOSCOPY (EGD);  Surgeon: Jelly Carlton MD;  Location:  OR     LAPAROSCOPIC CHOLECYSTECTOMY N/A 2/10/2017    Procedure: LAPAROSCOPIC CHOLECYSTECTOMY;  Surgeon: Ashwin Kemp MD;  Location: [facility] OR          Medications     Current Outpatient Medications   Medication     ASPIRIN EC PO     Coenzyme Q10 (COQ10 PO)     metFORMIN (GLUCOPHAGE-XR) 500 MG 24 hr tablet     multivitamin, therapeutic (THERA-VIT) TABS tablet     Rosuvastatin Calcium (CRESTOR PO)     latanoprost (XALATAN) 0.005 % ophthalmic solution     oxyCODONE-acetaminophen (PERCOCET) 5-325 MG per tablet     No current facility-administered medications for this visit.           Family History:   Brother had CaP - had RALP         Social History:     Social History     Socioeconomic History     Marital status:      Spouse name: Not on file     Number of children: Not on file     Years of education: Not on file     Highest education level: Not on file   Occupational History     Not on file   Social Needs     Financial resource strain: Not on file     Food insecurity:     Worry: Not on file     Inability: Not on file     Transportation needs:     Medical: Not on file     Non-medical: Not on file   Tobacco Use     Smoking status: Never Smoker     Smokeless tobacco: Never Used   Substance and Sexual Activity     Alcohol use: Yes     Drug use: Never     Sexual activity: Not on file   Lifestyle     Physical activity:     Days per week: Not on file     Minutes per session: Not on file     Stress: Not on file   Relationships      "Social connections:     Talks on phone: Not on file     Gets together: Not on file     Attends Worship service: Not on file     Active member of club or organization: Not on file     Attends meetings of clubs or organizations: Not on file     Relationship status: Not on file     Intimate partner violence:     Fear of current or ex partner: Not on file     Emotionally abused: Not on file     Physically abused: Not on file     Forced sexual activity: Not on file   Other Topics Concern     Parent/sibling w/ CABG, MI or angioplasty before 65F 55M? Not Asked   Social History Narrative     Not on file          Allergies:   Atorvastatin; Simvastatin; and Sulfa drugs         Review of Systems:  From intake questionnaire     Skin: negative  Eyes: negative  Ears/Nose/Throat: negative  Respiratory: No shortness of breath, dyspnea on exertion, cough, or hemoptysis  Cardiovascular: No chest pain or palpitations  Gastrointestinal: negative; no nausea/vomiting, constipation or diarrhea  Genitourinary: as per HPI  Musculoskeletal: negative  Neurologic: negative  Psychiatric: negative  Hematologic/Lymphatic/Immunologic: negative  Endocrine: negative         Physical Exam:     Patient is a 74 year old  male   Vitals: Blood pressure (!) 154/94, pulse 97, temperature 97.7  F (36.5  C), temperature source Tympanic, resp. rate 15, height 1.73 m (5' 8.11\"), weight 79.8 kg (176 lb), SpO2 97 %.  Constitutional: Body mass index is 26.67 kg/m .  Alert, no acute distress, oriented, conversant  Eyes: no scleral icterus; extraocular muscles intact, moist conjunctivae  Neck: trachea midline, no thyromegaly  Ears/nose/mouth: throat/mouth:normal, good dentition  Respiratory: no respiratory distress, or pursed lip breathing  Cardiovascular: pulses strong and intact; no obvious jugular venous distension present  Gastrointestinal: soft, nontender, no organomegaly or masses,   Musculoskeletal: extremities normal, no peripheral edema  Skin: no " suspicious lesions or rashes  Neuro: Alert, oriented, speech and mentation normal  Psych: affect and mood normal, alert and oriented to person, place and time  Gait: Normal      Labs and Pathology:    I reviewed all applicable laboratory and pathology data and went over findings with patient  Significant for   Lab Results   Component Value Date    CR 0.71 02/10/2017    CR 0.70 02/09/2017       Imaging:    I directly visualized and reviewed all applicable imaging and went over findings with patient.    MRI Prostate 9/4/2019  Size: 23 ml  IMPRESSION:  Images are moderately motion degraded, despite several attempts to reimage.  1. A suspicious abnormality, this examination is characterized as PIRADS 4-high probability. Clinically significant cancer is likely to be present. There is a 1 cm focus of signal abnormality in the posterolateral left peripheral zone mid gland to apex.  2. No suspicious adenopathy or bony lesion.    NM Bone Scan 11/11/2019  IMPRESSION:    1. No convincing evidence for metastatic disease.    2. Multifocal radiotracer uptake as described most likely degenerative in etiology.     CT abd/pelvis 11/7/2019  IMPRESSION:      1. No evidence for metastatic disease.    2. Hepatic steatosis.    3. Degenerative changes in the lumbar spine    4. Total hip arthroplasty obscures some detail in the pelvis but no suspicious findings.    5. Iliac artery aneurysms with eccentric thrombus. One on the left measures 1.8 cm. 6. Minimal perirectal haziness could be from transrectal biopsies. See above.        Outside and Past Medical records:    I spent 10 minutes reviewing outside and past medical records.         Assessment and Plan:     Assessment: 74 year old male with Jimbo 4+4=8 prostate cancer, PSA 6.2, PIRADS 4 on MRI. We had an extensive discussion about the significance of localized, prostate cancer. We discussed the options for treatment of a localized prostate cancer including active surveillance,  brachytherapy, external beam radiation therapy, and surgical removal.      Furthermore, we discussed the relative merits of robotic assisted radical prostatectomy. We also discussed the advantages and disadvantages and roles of open surgery vs. laparoscopic (and Da Kendall assisted) surgery. The anticipated post-operative course was explained, including an anticipated 1-2 day hospital stay. Catheter will remain in place 10-14 days.      We discussed that there was no clear evidence of advantage between surgery and radiation with regard to risk of recurrence. We discussed option for discussion with radiation oncology, but patient would prefer surgery.      The risks, benefits, alternatives, and personnel involved in the procedure were discussed. Specifically, we discussed that risks include but are not limited to anesthetic complications including stroke, MI, DVT/PE, as well as risk of bleeding, bowel injury, infection, lymphocele, urine leak and other potentially unforseen complications. Also discussed the risk of bladder neck contracture and other urinary symptoms after procedure. In addition, we discussed risk of urinary incontinence and erectile dysfunction following the procedure. Finally, we discussed risk for adverse pathologic features, including positive surgical margins and potential for post-surgical radiation, hormone ablation and long-term need for PSA monitoring,.  All questions were answered in detail.  A written informed consent will be finalized on the morning of the procedure.    Plan:  Schedule for robotic-assisted laparoscopic radical prostatectomy    Spencer Kinsey MD  Urology  [facility]

## 2019-11-13 NOTE — LETTER
11/13/2019         RE: Krista Antonio  [id number] [facility] [address]        Dear Colleague,    Thank you for referring your patient, Krista Antonio, to the [facility]. Please see a copy of my visit note below.          Chief Complaint:    {Diagnoses:379003}           Consult or Referral:     Mr. Krista Antonio is a 74 year old male seen at the request of Dr. Lny.         History of Present Illness:    Krista Antonio is a very pleasant 74 year old male who presents with a history of {Diagnoses:136765}.      ***             Past Medical History:   No past medical history on file.         Past Surgical History:     Past Surgical History:   Procedure Laterality Date     ENDOSCOPIC RETROGRADE CHOLANGIOPANCREATOGRAM N/A 2/9/2017    Procedure: ENDOSCOPIC RETROGRADE CHOLANGIOPANCREATOGRAM;  Surgeon: Jelly Carlton MD;  Location:  OR      UGI ENDOSCOPY W EUS N/A 2/9/2017    Procedure: COMBINED ENDOSCOPIC ULTRASOUND, ESOPHAGOSCOPY, GASTROSCOPY, DUODENOSCOPY (EGD);  Surgeon: Jelly Carlton MD;  Location:  OR     LAPAROSCOPIC CHOLECYSTECTOMY N/A 2/10/2017    Procedure: LAPAROSCOPIC CHOLECYSTECTOMY;  Surgeon: Ashwin Kemp MD;  Location: [facility] OR            Medications     Current Outpatient Medications   Medication     ASPIRIN EC PO     Coenzyme Q10 (COQ10 PO)     metFORMIN (GLUCOPHAGE-XR) 500 MG 24 hr tablet     multivitamin, therapeutic (THERA-VIT) TABS tablet     Rosuvastatin Calcium (CRESTOR PO)     latanoprost (XALATAN) 0.005 % ophthalmic solution     oxyCODONE-acetaminophen (PERCOCET) 5-325 MG per tablet     No current facility-administered medications for this visit.             Family History:   Brother had CaP - had RALP         Social History:     Social History     Socioeconomic History     Marital status:      Spouse name: Not on file     Number of children: Not on file     Years of education: Not on file     Highest  education level: Not on file   Occupational History     Not on file   Social Needs     Financial resource strain: Not on file     Food insecurity:     Worry: Not on file     Inability: Not on file     Transportation needs:     Medical: Not on file     Non-medical: Not on file   Tobacco Use     Smoking status: Never Smoker     Smokeless tobacco: Never Used   Substance and Sexual Activity     Alcohol use: Yes     Drug use: Never     Sexual activity: Not on file   Lifestyle     Physical activity:     Days per week: Not on file     Minutes per session: Not on file     Stress: Not on file   Relationships     Social connections:     Talks on phone: Not on file     Gets together: Not on file     Attends Orthodoxy service: Not on file     Active member of club or organization: Not on file     Attends meetings of clubs or organizations: Not on file     Relationship status: Not on file     Intimate partner violence:     Fear of current or ex partner: Not on file     Emotionally abused: Not on file     Physically abused: Not on file     Forced sexual activity: Not on file   Other Topics Concern     Parent/sibling w/ CABG, MI or angioplasty before 65F 55M? Not Asked   Social History Narrative     Not on file            Allergies:   Atorvastatin; Simvastatin; and Sulfa drugs         Review of Systems:  From intake questionnaire     Skin: negative  Eyes: negative  Ears/Nose/Throat: negative  Respiratory: No shortness of breath, dyspnea on exertion, cough, or hemoptysis  Cardiovascular: No chest pain or palpitations  Gastrointestinal: negative; no nausea/vomiting, constipation or diarrhea  Genitourinary: as per HPI  Musculoskeletal: negative  Neurologic: negative  Psychiatric: negative  Hematologic/Lymphatic/Immunologic: negative  Endocrine: negative         Physical Exam:     Patient is a 74 year old  male   Vitals: Blood pressure (!) 154/94, pulse 97, temperature 97.7  F (36.5  C), temperature source Tympanic, resp. rate 15,  "height 1.73 m (5' 8.11\"), weight 79.8 kg (176 lb), SpO2 97 %.  Constitutional: Body mass index is 26.67 kg/m .  Alert, no acute distress, oriented, conversant  Eyes: no scleral icterus; extraocular muscles intact, moist conjunctivae  Neck: trachea midline, no thyromegaly  Ears/nose/mouth: throat/mouth:normal, good dentition  Respiratory: no respiratory distress, or pursed lip breathing  Cardiovascular: pulses strong and intact; no obvious jugular venous distension present  Gastrointestinal: soft, nontender, no organomegaly or masses,   Lymphatics: No inguinal adenopathy  Musculoskeletal: extremities normal, no peripheral edema  Skin: no suspicious lesions or rashes  Neuro: Alert, oriented, speech and mentation normal  Psych: affect and mood normal, alert and oriented to person, place and time  Gait: Normal  : {ALESSANDRO EXAM MALE GENITAL:901570}      Labs and Pathology:    I reviewed all applicable laboratory and pathology data and went over findings with patient  Significant for   Lab Results   Component Value Date    CR 0.71 02/10/2017    CR 0.70 02/09/2017       Imaging:    I directly visualized and reviewed all applicable imaging and went over findings with patient.  Significant for ***      Outside and Past Medical records:    I spent 10 minutes reviewing outside and past medical records.       Standardized Questionnaire:      AUASS: ***/35 QOL:***  MALKA: ***/25         Assessment and Plan:     Assessment:   {Diagnoses:879729}    Plan:  ***    Orders  No orders of the defined types were placed in this encounter.      Spencer Kinsey MD  Urology  [facility]              Chief Complaint:    Prostate Cancer           Consult or Referral:     Mr. Krista Antonio is a 74 year old male seen at the request of Dr. Lyn.         History of Present Illness:    Krista Antonio is a very pleasant 74 year old male who presents with a history of {Diagnoses:731390}.      ***             Past " Medical History:   No past medical history on file.         Past Surgical History:     Past Surgical History:   Procedure Laterality Date     ENDOSCOPIC RETROGRADE CHOLANGIOPANCREATOGRAM N/A 2/9/2017    Procedure: ENDOSCOPIC RETROGRADE CHOLANGIOPANCREATOGRAM;  Surgeon: Jelly Carlton MD;  Location:  OR      UGI ENDOSCOPY W EUS N/A 2/9/2017    Procedure: COMBINED ENDOSCOPIC ULTRASOUND, ESOPHAGOSCOPY, GASTROSCOPY, DUODENOSCOPY (EGD);  Surgeon: Jelly Carlton MD;  Location: [facility] OR     LAPAROSCOPIC CHOLECYSTECTOMY N/A 2/10/2017    Procedure: LAPAROSCOPIC CHOLECYSTECTOMY;  Surgeon: Ashwin Kemp MD;  Location: [facility] OR            Medications     Current Outpatient Medications   Medication     ASPIRIN EC PO     Coenzyme Q10 (COQ10 PO)     metFORMIN (GLUCOPHAGE-XR) 500 MG 24 hr tablet     multivitamin, therapeutic (THERA-VIT) TABS tablet     Rosuvastatin Calcium (CRESTOR PO)     latanoprost (XALATAN) 0.005 % ophthalmic solution     oxyCODONE-acetaminophen (PERCOCET) 5-325 MG per tablet     No current facility-administered medications for this visit.             Family History:   Brother had CaP - had RALP         Social History:     Social History     Socioeconomic History     Marital status:      Spouse name: Not on file     Number of children: Not on file     Years of education: Not on file     Highest education level: Not on file   Occupational History     Not on file   Social Needs     Financial resource strain: Not on file     Food insecurity:     Worry: Not on file     Inability: Not on file     Transportation needs:     Medical: Not on file     Non-medical: Not on file   Tobacco Use     Smoking status: Never Smoker     Smokeless tobacco: Never Used   Substance and Sexual Activity     Alcohol use: Yes     Drug use: Never     Sexual activity: Not on file   Lifestyle     Physical activity:     Days per week: Not on file     Minutes per session: Not on file     Stress: Not on file  "  Relationships     Social connections:     Talks on phone: Not on file     Gets together: Not on file     Attends Anabaptism service: Not on file     Active member of club or organization: Not on file     Attends meetings of clubs or organizations: Not on file     Relationship status: Not on file     Intimate partner violence:     Fear of current or ex partner: Not on file     Emotionally abused: Not on file     Physically abused: Not on file     Forced sexual activity: Not on file   Other Topics Concern     Parent/sibling w/ CABG, MI or angioplasty before 65F 55M? Not Asked   Social History Narrative     Not on file            Allergies:   Atorvastatin; Simvastatin; and Sulfa drugs         Review of Systems:  From intake questionnaire     Skin: negative  Eyes: negative  Ears/Nose/Throat: negative  Respiratory: No shortness of breath, dyspnea on exertion, cough, or hemoptysis  Cardiovascular: No chest pain or palpitations  Gastrointestinal: negative; no nausea/vomiting, constipation or diarrhea  Genitourinary: as per HPI  Musculoskeletal: negative  Neurologic: negative  Psychiatric: negative  Hematologic/Lymphatic/Immunologic: negative  Endocrine: negative         Physical Exam:     Patient is a 74 year old  male   Vitals: Blood pressure (!) 154/94, pulse 97, temperature 97.7  F (36.5  C), temperature source Tympanic, resp. rate 15, height 1.73 m (5' 8.11\"), weight 79.8 kg (176 lb), SpO2 97 %.  Constitutional: Body mass index is 26.67 kg/m .  Alert, no acute distress, oriented, conversant  Eyes: no scleral icterus; extraocular muscles intact, moist conjunctivae  Neck: trachea midline, no thyromegaly  Ears/nose/mouth: throat/mouth:normal, good dentition  Respiratory: no respiratory distress, or pursed lip breathing  Cardiovascular: pulses strong and intact; no obvious jugular venous distension present  Gastrointestinal: soft, nontender, no organomegaly or masses,   Lymphatics: No inguinal adenopathy  Musculoskeletal: " extremities normal, no peripheral edema  Skin: no suspicious lesions or rashes  Neuro: Alert, oriented, speech and mentation normal  Psych: affect and mood normal, alert and oriented to person, place and time  Gait: Normal  : {ALESSANDRO EXAM MALE GENITAL:184687}      Labs and Pathology:    I reviewed all applicable laboratory and pathology data and went over findings with patient  Significant for   Lab Results   Component Value Date    CR 0.71 02/10/2017    CR 0.70 02/09/2017       Imaging:    I directly visualized and reviewed all applicable imaging and went over findings with patient.  Significant for ***      Outside and Past Medical records:    I spent 10 minutes reviewing outside and past medical records.       Standardized Questionnaire:      AUASS: ***/35 QOL:***  MALKA: ***/25         Assessment and Plan:     Assessment:   {Diagnoses:589435}    Plan:  ***    Orders  No orders of the defined types were placed in this encounter.      Spencer Kinsey MD  Urology  [facility]        Again, thank you for allowing me to participate in the care of your patient.        Sincerely,        Spencer Kinsey MD

## 2019-11-14 PROCEDURE — 00000346 ZZHCL STATISTIC REVIEW OUTSIDE SLIDES TC 88321: Performed by: UROLOGY

## 2019-11-15 LAB — COPATH REPORT: NORMAL

## 2019-11-19 PROBLEM — R97.20 ELEVATED PSA: Status: ACTIVE | Noted: 2019-07-31

## 2019-11-19 PROBLEM — E11.9 TYPE II DIABETES MELLITUS, WELL CONTROLLED (H): Status: ACTIVE | Noted: 2018-10-30

## 2019-12-18 ENCOUNTER — HOSPITAL ENCOUNTER (OUTPATIENT)
Dept: LAB | Facility: CLINIC | Age: 74
Discharge: HOME | DRG: 707 | End: 2019-12-18
Attending: UROLOGY | Admitting: UROLOGY
Payer: COMMERCIAL

## 2019-12-18 DIAGNOSIS — Z01.812 PRE-OPERATIVE LABORATORY EXAMINATION: ICD-10-CM

## 2019-12-18 LAB
ABO + RH BLD: NORMAL
ABO + RH BLD: NORMAL
BLD GP AB SCN SERPL QL: NORMAL
BLOOD BANK CMNT PATIENT-IMP: NORMAL
CREAT SERPL-MCNC: 0.76 MG/DL (ref 0.66–1.25)
GFR SERPL CREATININE-BSD FRML MDRD: >90 ML/MIN/{1.73_M2}
POTASSIUM SERPL-SCNC: 3.8 MMOL/L (ref 3.4–5.3)
SPECIMEN EXP DATE BLD: NORMAL

## 2019-12-18 PROCEDURE — 84132 ASSAY OF SERUM POTASSIUM: CPT | Performed by: UROLOGY

## 2019-12-18 PROCEDURE — 86850 RBC ANTIBODY SCREEN: CPT | Performed by: UROLOGY

## 2019-12-18 PROCEDURE — 86901 BLOOD TYPING SEROLOGIC RH(D): CPT | Performed by: UROLOGY

## 2019-12-18 PROCEDURE — 86900 BLOOD TYPING SEROLOGIC ABO: CPT | Performed by: UROLOGY

## 2019-12-18 PROCEDURE — 36415 COLL VENOUS BLD VENIPUNCTURE: CPT | Performed by: UROLOGY

## 2019-12-18 PROCEDURE — 82565 ASSAY OF CREATININE: CPT | Performed by: UROLOGY

## 2019-12-18 NOTE — PHARMACY-ADMISSION MEDICATION HISTORY
Medication reconciliation interview completed by pre-admitting nurse Maite Rosa, reviewed by pharmacy. No further clarifications needed.       Prior to Admission medications    Medication Sig Last Dose Taking? Auth Provider   ASPIRIN EC PO Take 81 mg by mouth every evening  12/10/2019 Yes Unknown, Entered By History   Coenzyme Q10 (COQ10 PO) Take 1 tablet by mouth every evening   Yes Unknown, Entered By History   latanoprost (XALATAN) 0.005 % ophthalmic solution PLACE 1 DROP INTO BOTH EYES EVERY NIGHT AT BEDTIME  Yes Reported, Patient   metFORMIN (GLUCOPHAGE-XR) 500 MG 24 hr tablet Take 1,000 mg by mouth daily (with dinner)   Yes Unknown, Entered By History   multivitamin, therapeutic (THERA-VIT) TABS tablet Take 1 tablet by mouth every evening   Yes Unknown, Entered By History   Rosuvastatin Calcium (CRESTOR PO) Take 10 mg by mouth every evening   Yes Unknown, Entered By History

## 2019-12-19 ENCOUNTER — ANESTHESIA EVENT (OUTPATIENT)
Dept: SURGERY | Facility: CLINIC | Age: 74
DRG: 707 | End: 2019-12-19
Payer: COMMERCIAL

## 2019-12-19 ENCOUNTER — ANESTHESIA (OUTPATIENT)
Dept: SURGERY | Facility: CLINIC | Age: 74
DRG: 707 | End: 2019-12-19
Payer: COMMERCIAL

## 2019-12-19 ENCOUNTER — HOSPITAL ENCOUNTER (INPATIENT)
Facility: CLINIC | Age: 74
LOS: 2 days | Discharge: HOME | DRG: 707 | End: 2019-12-21
Attending: UROLOGY | Admitting: UROLOGY
Payer: COMMERCIAL

## 2019-12-19 DIAGNOSIS — C61 PROSTATE CANCER (H): Primary | ICD-10-CM

## 2019-12-19 DIAGNOSIS — C61 PROSTATE CANCER (H): ICD-10-CM

## 2019-12-19 LAB
GLUCOSE BLDC GLUCOMTR-MCNC: 111 MG/DL (ref 70–99)
GLUCOSE BLDC GLUCOMTR-MCNC: 124 MG/DL (ref 70–99)
GLUCOSE BLDC GLUCOMTR-MCNC: 143 MG/DL (ref 70–99)
GLUCOSE BLDC GLUCOMTR-MCNC: 160 MG/DL (ref 70–99)
HBA1C MFR BLD: 6.7 % (ref 0–5.6)

## 2019-12-19 PROCEDURE — 36000085 ZZH SURGERY LEVEL 8 1ST 30 MIN: Performed by: UROLOGY

## 2019-12-19 PROCEDURE — 88307 TISSUE EXAM BY PATHOLOGIST: CPT | Performed by: UROLOGY

## 2019-12-19 PROCEDURE — 40000306 ZZH STATISTIC PRE PROC ASSESS II: Performed by: UROLOGY

## 2019-12-19 PROCEDURE — 36415 COLL VENOUS BLD VENIPUNCTURE: CPT | Performed by: STUDENT IN AN ORGANIZED HEALTH CARE EDUCATION/TRAINING PROGRAM

## 2019-12-19 PROCEDURE — 38571 LAPAROSCOPY LYMPHADENECTOMY: CPT | Mod: 51 | Performed by: UROLOGY

## 2019-12-19 PROCEDURE — 88307 TISSUE EXAM BY PATHOLOGIST: CPT | Mod: 26 | Performed by: UROLOGY

## 2019-12-19 PROCEDURE — 25000132 ZZH RX MED GY IP 250 OP 250 PS 637: Performed by: STUDENT IN AN ORGANIZED HEALTH CARE EDUCATION/TRAINING PROGRAM

## 2019-12-19 PROCEDURE — 25800030 ZZH RX IP 258 OP 636: Performed by: ANESTHESIOLOGY

## 2019-12-19 PROCEDURE — 71000013 ZZH RECOVERY PHASE 1 LEVEL 1 EA ADDTL HR: Performed by: UROLOGY

## 2019-12-19 PROCEDURE — 88331 PATH CONSLTJ SURG 1 BLK 1SPC: CPT | Performed by: UROLOGY

## 2019-12-19 PROCEDURE — 25000128 H RX IP 250 OP 636: Performed by: NURSE ANESTHETIST, CERTIFIED REGISTERED

## 2019-12-19 PROCEDURE — 25800030 ZZH RX IP 258 OP 636: Performed by: NURSE ANESTHETIST, CERTIFIED REGISTERED

## 2019-12-19 PROCEDURE — 55866 LAPS SURG PRST8ECT RPBIC RAD: CPT | Performed by: UROLOGY

## 2019-12-19 PROCEDURE — 25800030 ZZH RX IP 258 OP 636: Performed by: STUDENT IN AN ORGANIZED HEALTH CARE EDUCATION/TRAINING PROGRAM

## 2019-12-19 PROCEDURE — 37000009 ZZH ANESTHESIA TECHNICAL FEE, EACH ADDTL 15 MIN: Performed by: UROLOGY

## 2019-12-19 PROCEDURE — 0VT34ZZ RESECTION OF BILATERAL SEMINAL VESICLES, PERCUTANEOUS ENDOSCOPIC APPROACH: ICD-10-PCS | Performed by: UROLOGY

## 2019-12-19 PROCEDURE — 88305 TISSUE EXAM BY PATHOLOGIST: CPT | Performed by: UROLOGY

## 2019-12-19 PROCEDURE — 27210794 ZZH OR GENERAL SUPPLY STERILE: Performed by: UROLOGY

## 2019-12-19 PROCEDURE — 88309 TISSUE EXAM BY PATHOLOGIST: CPT | Performed by: UROLOGY

## 2019-12-19 PROCEDURE — 36000087 ZZH SURGERY LEVEL 8 EA 15 ADDTL MIN: Performed by: UROLOGY

## 2019-12-19 PROCEDURE — 25000132 ZZH RX MED GY IP 250 OP 250 PS 637: Performed by: ANESTHESIOLOGY

## 2019-12-19 PROCEDURE — 25000128 H RX IP 250 OP 636: Performed by: UROLOGY

## 2019-12-19 PROCEDURE — 25800030 ZZH RX IP 258 OP 636: Performed by: UROLOGY

## 2019-12-19 PROCEDURE — 88305 TISSUE EXAM BY PATHOLOGIST: CPT | Mod: 26 | Performed by: UROLOGY

## 2019-12-19 PROCEDURE — 8E0W4CZ ROBOTIC ASSISTED PROCEDURE OF TRUNK REGION, PERCUTANEOUS ENDOSCOPIC APPROACH: ICD-10-PCS | Performed by: UROLOGY

## 2019-12-19 PROCEDURE — 71000012 ZZH RECOVERY PHASE 1 LEVEL 1 FIRST HR: Performed by: UROLOGY

## 2019-12-19 PROCEDURE — 83036 HEMOGLOBIN GLYCOSYLATED A1C: CPT | Performed by: STUDENT IN AN ORGANIZED HEALTH CARE EDUCATION/TRAINING PROGRAM

## 2019-12-19 PROCEDURE — 88309 TISSUE EXAM BY PATHOLOGIST: CPT | Mod: 26 | Performed by: UROLOGY

## 2019-12-19 PROCEDURE — 0VT04ZZ RESECTION OF PROSTATE, PERCUTANEOUS ENDOSCOPIC APPROACH: ICD-10-PCS | Performed by: UROLOGY

## 2019-12-19 PROCEDURE — S2900 ROBOTIC SURGICAL SYSTEM: HCPCS | Performed by: UROLOGY

## 2019-12-19 PROCEDURE — 25000125 ZZHC RX 250: Performed by: NURSE ANESTHETIST, CERTIFIED REGISTERED

## 2019-12-19 PROCEDURE — 07BC4ZX EXCISION OF PELVIS LYMPHATIC, PERCUTANEOUS ENDOSCOPIC APPROACH, DIAGNOSTIC: ICD-10-PCS | Performed by: UROLOGY

## 2019-12-19 PROCEDURE — 88331 PATH CONSLTJ SURG 1 BLK 1SPC: CPT | Mod: 26,59 | Performed by: UROLOGY

## 2019-12-19 PROCEDURE — 37000008 ZZH ANESTHESIA TECHNICAL FEE, 1ST 30 MIN: Performed by: UROLOGY

## 2019-12-19 PROCEDURE — 82962 GLUCOSE BLOOD TEST: CPT

## 2019-12-19 RX ORDER — BUPIVACAINE HYDROCHLORIDE 2.5 MG/ML
INJECTION, SOLUTION INFILTRATION; PERINEURAL PRN
Status: DISCONTINUED | OUTPATIENT
Start: 2019-12-19 | End: 2019-12-19 | Stop reason: HOSPADM

## 2019-12-19 RX ORDER — NALOXONE HYDROCHLORIDE 0.4 MG/ML
.1-.4 INJECTION, SOLUTION INTRAMUSCULAR; INTRAVENOUS; SUBCUTANEOUS
Status: DISCONTINUED | OUTPATIENT
Start: 2019-12-19 | End: 2019-12-21 | Stop reason: HOSPADM

## 2019-12-19 RX ORDER — ROSUVASTATIN CALCIUM 5 MG/1
10 TABLET, COATED ORAL EVERY EVENING
Status: DISCONTINUED | OUTPATIENT
Start: 2019-12-19 | End: 2019-12-21 | Stop reason: HOSPADM

## 2019-12-19 RX ORDER — DEXAMETHASONE SODIUM PHOSPHATE 4 MG/ML
INJECTION, SOLUTION INTRA-ARTICULAR; INTRALESIONAL; INTRAMUSCULAR; INTRAVENOUS; SOFT TISSUE PRN
Status: DISCONTINUED | OUTPATIENT
Start: 2019-12-19 | End: 2019-12-19

## 2019-12-19 RX ORDER — DEXTROSE MONOHYDRATE 25 G/50ML
25-50 INJECTION, SOLUTION INTRAVENOUS
Status: DISCONTINUED | OUTPATIENT
Start: 2019-12-19 | End: 2019-12-21 | Stop reason: HOSPADM

## 2019-12-19 RX ORDER — HYDROMORPHONE HYDROCHLORIDE 1 MG/ML
0.3 INJECTION, SOLUTION INTRAMUSCULAR; INTRAVENOUS; SUBCUTANEOUS EVERY 4 HOURS PRN
Status: DISCONTINUED | OUTPATIENT
Start: 2019-12-19 | End: 2019-12-21 | Stop reason: HOSPADM

## 2019-12-19 RX ORDER — ACETAMINOPHEN 325 MG/1
650 TABLET ORAL EVERY 4 HOURS
Status: DISCONTINUED | OUTPATIENT
Start: 2019-12-19 | End: 2019-12-21 | Stop reason: HOSPADM

## 2019-12-19 RX ORDER — KETOROLAC TROMETHAMINE 30 MG/ML
INJECTION, SOLUTION INTRAMUSCULAR; INTRAVENOUS PRN
Status: DISCONTINUED | OUTPATIENT
Start: 2019-12-19 | End: 2019-12-19

## 2019-12-19 RX ORDER — AMOXICILLIN 250 MG
1 CAPSULE ORAL 2 TIMES DAILY
Status: DISCONTINUED | OUTPATIENT
Start: 2019-12-19 | End: 2019-12-21 | Stop reason: HOSPADM

## 2019-12-19 RX ORDER — SODIUM CHLORIDE 9 MG/ML
INJECTION, SOLUTION INTRAVENOUS CONTINUOUS
Status: DISCONTINUED | OUTPATIENT
Start: 2019-12-19 | End: 2019-12-21

## 2019-12-19 RX ORDER — NICOTINE POLACRILEX 4 MG
15-30 LOZENGE BUCCAL
Status: DISCONTINUED | OUTPATIENT
Start: 2019-12-19 | End: 2019-12-21 | Stop reason: HOSPADM

## 2019-12-19 RX ORDER — NEOMYCIN/BACITRACIN/POLYMYXINB 3.5-400-5K
OINTMENT (GRAM) TOPICAL 4 TIMES DAILY PRN
Status: DISCONTINUED | OUTPATIENT
Start: 2019-12-19 | End: 2019-12-21 | Stop reason: HOSPADM

## 2019-12-19 RX ORDER — CEFAZOLIN SODIUM 2 G/100ML
2 INJECTION, SOLUTION INTRAVENOUS
Status: COMPLETED | OUTPATIENT
Start: 2019-12-19 | End: 2019-12-19

## 2019-12-19 RX ORDER — SODIUM CHLORIDE, SODIUM LACTATE, POTASSIUM CHLORIDE, CALCIUM CHLORIDE 600; 310; 30; 20 MG/100ML; MG/100ML; MG/100ML; MG/100ML
INJECTION, SOLUTION INTRAVENOUS CONTINUOUS PRN
Status: DISCONTINUED | OUTPATIENT
Start: 2019-12-19 | End: 2019-12-19

## 2019-12-19 RX ORDER — PROPOFOL 10 MG/ML
INJECTION, EMULSION INTRAVENOUS PRN
Status: DISCONTINUED | OUTPATIENT
Start: 2019-12-19 | End: 2019-12-19

## 2019-12-19 RX ORDER — LATANOPROST 50 UG/ML
1 SOLUTION/ DROPS OPHTHALMIC AT BEDTIME
Status: DISCONTINUED | OUTPATIENT
Start: 2019-12-19 | End: 2019-12-21 | Stop reason: HOSPADM

## 2019-12-19 RX ORDER — FENTANYL CITRATE 50 UG/ML
25-50 INJECTION, SOLUTION INTRAMUSCULAR; INTRAVENOUS EVERY 5 MIN PRN
Status: DISCONTINUED | OUTPATIENT
Start: 2019-12-19 | End: 2019-12-19 | Stop reason: HOSPADM

## 2019-12-19 RX ORDER — NALOXONE HYDROCHLORIDE 0.4 MG/ML
.1-.4 INJECTION, SOLUTION INTRAMUSCULAR; INTRAVENOUS; SUBCUTANEOUS
Status: DISCONTINUED | OUTPATIENT
Start: 2019-12-19 | End: 2019-12-19 | Stop reason: HOSPADM

## 2019-12-19 RX ORDER — FENTANYL CITRATE 50 UG/ML
25-50 INJECTION, SOLUTION INTRAMUSCULAR; INTRAVENOUS
Status: DISCONTINUED | OUTPATIENT
Start: 2019-12-19 | End: 2019-12-19 | Stop reason: HOSPADM

## 2019-12-19 RX ORDER — LIDOCAINE HYDROCHLORIDE 10 MG/ML
INJECTION, SOLUTION INFILTRATION; PERINEURAL PRN
Status: DISCONTINUED | OUTPATIENT
Start: 2019-12-19 | End: 2019-12-19

## 2019-12-19 RX ORDER — CEFAZOLIN SODIUM 1 G/3ML
1 INJECTION, POWDER, FOR SOLUTION INTRAMUSCULAR; INTRAVENOUS SEE ADMIN INSTRUCTIONS
Status: DISCONTINUED | OUTPATIENT
Start: 2019-12-19 | End: 2019-12-19 | Stop reason: HOSPADM

## 2019-12-19 RX ORDER — GABAPENTIN 300 MG/1
300 CAPSULE ORAL ONCE
Status: COMPLETED | OUTPATIENT
Start: 2019-12-19 | End: 2019-12-19

## 2019-12-19 RX ORDER — HEPARIN SODIUM 5000 [USP'U]/.5ML
5000 INJECTION, SOLUTION INTRAVENOUS; SUBCUTANEOUS
Status: COMPLETED | OUTPATIENT
Start: 2019-12-19 | End: 2019-12-19

## 2019-12-19 RX ORDER — LIDOCAINE 40 MG/G
CREAM TOPICAL
Status: DISCONTINUED | OUTPATIENT
Start: 2019-12-19 | End: 2019-12-21 | Stop reason: HOSPADM

## 2019-12-19 RX ORDER — ONDANSETRON 4 MG/1
4 TABLET, ORALLY DISINTEGRATING ORAL EVERY 30 MIN PRN
Status: DISCONTINUED | OUTPATIENT
Start: 2019-12-19 | End: 2019-12-19 | Stop reason: HOSPADM

## 2019-12-19 RX ORDER — KETOROLAC TROMETHAMINE 15 MG/ML
15 INJECTION, SOLUTION INTRAMUSCULAR; INTRAVENOUS EVERY 6 HOURS
Status: DISCONTINUED | OUTPATIENT
Start: 2019-12-19 | End: 2019-12-19 | Stop reason: DRUGHIGH

## 2019-12-19 RX ORDER — GLYCOPYRROLATE 0.2 MG/ML
INJECTION, SOLUTION INTRAMUSCULAR; INTRAVENOUS PRN
Status: DISCONTINUED | OUTPATIENT
Start: 2019-12-19 | End: 2019-12-19

## 2019-12-19 RX ORDER — LIDOCAINE 40 MG/G
CREAM TOPICAL
Status: DISCONTINUED | OUTPATIENT
Start: 2019-12-19 | End: 2019-12-19 | Stop reason: HOSPADM

## 2019-12-19 RX ORDER — PROPOFOL 10 MG/ML
INJECTION, EMULSION INTRAVENOUS CONTINUOUS PRN
Status: DISCONTINUED | OUTPATIENT
Start: 2019-12-19 | End: 2019-12-19

## 2019-12-19 RX ORDER — ONDANSETRON 2 MG/ML
4 INJECTION INTRAMUSCULAR; INTRAVENOUS EVERY 30 MIN PRN
Status: DISCONTINUED | OUTPATIENT
Start: 2019-12-19 | End: 2019-12-19 | Stop reason: HOSPADM

## 2019-12-19 RX ORDER — OXYCODONE HYDROCHLORIDE 5 MG/1
5-10 TABLET ORAL EVERY 4 HOURS PRN
Status: DISCONTINUED | OUTPATIENT
Start: 2019-12-19 | End: 2019-12-21 | Stop reason: HOSPADM

## 2019-12-19 RX ORDER — ONDANSETRON 2 MG/ML
4 INJECTION INTRAMUSCULAR; INTRAVENOUS EVERY 6 HOURS PRN
Status: DISCONTINUED | OUTPATIENT
Start: 2019-12-19 | End: 2019-12-21 | Stop reason: HOSPADM

## 2019-12-19 RX ORDER — HYDROMORPHONE HYDROCHLORIDE 1 MG/ML
.3-.5 INJECTION, SOLUTION INTRAMUSCULAR; INTRAVENOUS; SUBCUTANEOUS EVERY 10 MIN PRN
Status: DISCONTINUED | OUTPATIENT
Start: 2019-12-19 | End: 2019-12-19 | Stop reason: HOSPADM

## 2019-12-19 RX ORDER — NEOSTIGMINE METHYLSULFATE 1 MG/ML
VIAL (ML) INJECTION PRN
Status: DISCONTINUED | OUTPATIENT
Start: 2019-12-19 | End: 2019-12-19

## 2019-12-19 RX ORDER — KETOROLAC TROMETHAMINE 15 MG/ML
15 INJECTION, SOLUTION INTRAMUSCULAR; INTRAVENOUS EVERY 6 HOURS
Status: DISCONTINUED | OUTPATIENT
Start: 2019-12-19 | End: 2019-12-21 | Stop reason: HOSPADM

## 2019-12-19 RX ORDER — ALBUTEROL SULFATE 0.83 MG/ML
2.5 SOLUTION RESPIRATORY (INHALATION) EVERY 4 HOURS PRN
Status: DISCONTINUED | OUTPATIENT
Start: 2019-12-19 | End: 2019-12-19 | Stop reason: HOSPADM

## 2019-12-19 RX ORDER — SODIUM CHLORIDE, SODIUM LACTATE, POTASSIUM CHLORIDE, CALCIUM CHLORIDE 600; 310; 30; 20 MG/100ML; MG/100ML; MG/100ML; MG/100ML
INJECTION, SOLUTION INTRAVENOUS CONTINUOUS
Status: DISCONTINUED | OUTPATIENT
Start: 2019-12-19 | End: 2019-12-19 | Stop reason: HOSPADM

## 2019-12-19 RX ORDER — MEPERIDINE HYDROCHLORIDE 50 MG/ML
12.5 INJECTION INTRAMUSCULAR; INTRAVENOUS; SUBCUTANEOUS
Status: DISCONTINUED | OUTPATIENT
Start: 2019-12-19 | End: 2019-12-19 | Stop reason: HOSPADM

## 2019-12-19 RX ORDER — ACETAMINOPHEN 325 MG/1
975 TABLET ORAL ONCE
Status: COMPLETED | OUTPATIENT
Start: 2019-12-19 | End: 2019-12-19

## 2019-12-19 RX ORDER — ONDANSETRON 2 MG/ML
INJECTION INTRAMUSCULAR; INTRAVENOUS PRN
Status: DISCONTINUED | OUTPATIENT
Start: 2019-12-19 | End: 2019-12-19

## 2019-12-19 RX ORDER — FENTANYL CITRATE 50 UG/ML
INJECTION, SOLUTION INTRAMUSCULAR; INTRAVENOUS PRN
Status: DISCONTINUED | OUTPATIENT
Start: 2019-12-19 | End: 2019-12-19

## 2019-12-19 RX ORDER — ONDANSETRON 4 MG/1
4 TABLET, ORALLY DISINTEGRATING ORAL EVERY 6 HOURS PRN
Status: DISCONTINUED | OUTPATIENT
Start: 2019-12-19 | End: 2019-12-21 | Stop reason: HOSPADM

## 2019-12-19 RX ADMIN — PHENYLEPHRINE HYDROCHLORIDE 100 MCG: 10 INJECTION INTRAVENOUS at 09:58

## 2019-12-19 RX ADMIN — PHENYLEPHRINE HYDROCHLORIDE 100 MCG: 10 INJECTION INTRAVENOUS at 08:08

## 2019-12-19 RX ADMIN — Medication 3 MG: at 12:47

## 2019-12-19 RX ADMIN — LIDOCAINE HYDROCHLORIDE 50 MG: 10 INJECTION, SOLUTION INFILTRATION; PERINEURAL at 08:01

## 2019-12-19 RX ADMIN — ROCURONIUM BROMIDE 50 MG: 10 INJECTION INTRAVENOUS at 08:02

## 2019-12-19 RX ADMIN — ROCURONIUM BROMIDE 20 MG: 10 INJECTION INTRAVENOUS at 09:41

## 2019-12-19 RX ADMIN — GABAPENTIN 300 MG: 300 CAPSULE ORAL at 07:37

## 2019-12-19 RX ADMIN — PHENYLEPHRINE HYDROCHLORIDE 100 MCG: 10 INJECTION INTRAVENOUS at 11:36

## 2019-12-19 RX ADMIN — CEFAZOLIN SODIUM 2 G: 2 INJECTION, SOLUTION INTRAVENOUS at 08:05

## 2019-12-19 RX ADMIN — ACETAMINOPHEN 975 MG: 325 TABLET, FILM COATED ORAL at 07:37

## 2019-12-19 RX ADMIN — SODIUM CHLORIDE, POTASSIUM CHLORIDE, SODIUM LACTATE AND CALCIUM CHLORIDE: 600; 310; 30; 20 INJECTION, SOLUTION INTRAVENOUS at 12:00

## 2019-12-19 RX ADMIN — CEFAZOLIN 1 G: 1 INJECTION, POWDER, FOR SOLUTION INTRAMUSCULAR; INTRAVENOUS at 10:05

## 2019-12-19 RX ADMIN — PHENYLEPHRINE HYDROCHLORIDE 100 MCG: 10 INJECTION INTRAVENOUS at 08:16

## 2019-12-19 RX ADMIN — SODIUM CHLORIDE, POTASSIUM CHLORIDE, SODIUM LACTATE AND CALCIUM CHLORIDE: 600; 310; 30; 20 INJECTION, SOLUTION INTRAVENOUS at 07:39

## 2019-12-19 RX ADMIN — HEPARIN SODIUM 5000 UNITS: 5000 INJECTION, SOLUTION INTRAVENOUS; SUBCUTANEOUS at 07:32

## 2019-12-19 RX ADMIN — SODIUM CHLORIDE, PRESERVATIVE FREE: 5 INJECTION INTRAVENOUS at 15:55

## 2019-12-19 RX ADMIN — PHENYLEPHRINE HYDROCHLORIDE 100 MCG: 10 INJECTION INTRAVENOUS at 11:25

## 2019-12-19 RX ADMIN — ONDANSETRON HYDROCHLORIDE 4 MG: 2 INJECTION, SOLUTION INTRAVENOUS at 12:29

## 2019-12-19 RX ADMIN — FENTANYL CITRATE 25 MCG: 50 INJECTION, SOLUTION INTRAMUSCULAR; INTRAVENOUS at 11:07

## 2019-12-19 RX ADMIN — PHENYLEPHRINE HYDROCHLORIDE 150 MCG: 10 INJECTION INTRAVENOUS at 12:33

## 2019-12-19 RX ADMIN — FENTANYL CITRATE 50 MCG: 50 INJECTION, SOLUTION INTRAMUSCULAR; INTRAVENOUS at 09:00

## 2019-12-19 RX ADMIN — PHENYLEPHRINE HYDROCHLORIDE 100 MCG: 10 INJECTION INTRAVENOUS at 09:23

## 2019-12-19 RX ADMIN — PROPOFOL 150 MG: 10 INJECTION, EMULSION INTRAVENOUS at 08:01

## 2019-12-19 RX ADMIN — PHENYLEPHRINE HYDROCHLORIDE 150 MCG: 10 INJECTION INTRAVENOUS at 08:28

## 2019-12-19 RX ADMIN — FENTANYL CITRATE 25 MCG: 50 INJECTION, SOLUTION INTRAMUSCULAR; INTRAVENOUS at 12:30

## 2019-12-19 RX ADMIN — ROCURONIUM BROMIDE 20 MG: 10 INJECTION INTRAVENOUS at 10:48

## 2019-12-19 RX ADMIN — PHENYLEPHRINE HYDROCHLORIDE 100 MCG: 10 INJECTION INTRAVENOUS at 12:08

## 2019-12-19 RX ADMIN — ACETAMINOPHEN 650 MG: 325 TABLET, FILM COATED ORAL at 20:24

## 2019-12-19 RX ADMIN — ROCURONIUM BROMIDE 20 MG: 10 INJECTION INTRAVENOUS at 11:44

## 2019-12-19 RX ADMIN — DEXAMETHASONE SODIUM PHOSPHATE 4 MG: 4 INJECTION, SOLUTION INTRA-ARTICULAR; INTRALESIONAL; INTRAMUSCULAR; INTRAVENOUS; SOFT TISSUE at 08:02

## 2019-12-19 RX ADMIN — HYDROMORPHONE HYDROCHLORIDE 0.5 MG: 1 INJECTION, SOLUTION INTRAMUSCULAR; INTRAVENOUS; SUBCUTANEOUS at 12:51

## 2019-12-19 RX ADMIN — KETOROLAC TROMETHAMINE 15 MG: 30 INJECTION, SOLUTION INTRAMUSCULAR at 12:44

## 2019-12-19 RX ADMIN — PHENYLEPHRINE HYDROCHLORIDE 100 MCG: 10 INJECTION INTRAVENOUS at 10:44

## 2019-12-19 RX ADMIN — GLYCOPYRROLATE 0.4 MG: 0.2 INJECTION, SOLUTION INTRAMUSCULAR; INTRAVENOUS at 12:47

## 2019-12-19 RX ADMIN — FENTANYL CITRATE 150 MCG: 50 INJECTION, SOLUTION INTRAMUSCULAR; INTRAVENOUS at 08:01

## 2019-12-19 RX ADMIN — PHENYLEPHRINE HYDROCHLORIDE 100 MCG: 10 INJECTION INTRAVENOUS at 09:40

## 2019-12-19 RX ADMIN — PHENYLEPHRINE HYDROCHLORIDE 150 MCG: 10 INJECTION INTRAVENOUS at 09:13

## 2019-12-19 RX ADMIN — PHENYLEPHRINE HYDROCHLORIDE 150 MCG: 10 INJECTION INTRAVENOUS at 12:38

## 2019-12-19 RX ADMIN — SODIUM CHLORIDE, POTASSIUM CHLORIDE, SODIUM LACTATE AND CALCIUM CHLORIDE: 600; 310; 30; 20 INJECTION, SOLUTION INTRAVENOUS at 08:09

## 2019-12-19 RX ADMIN — ROCURONIUM BROMIDE 10 MG: 10 INJECTION INTRAVENOUS at 10:26

## 2019-12-19 RX ADMIN — SODIUM CHLORIDE, POTASSIUM CHLORIDE, SODIUM LACTATE AND CALCIUM CHLORIDE: 600; 310; 30; 20 INJECTION, SOLUTION INTRAVENOUS at 07:00

## 2019-12-19 RX ADMIN — PROPOFOL 75 MCG/KG/MIN: 10 INJECTION, EMULSION INTRAVENOUS at 08:08

## 2019-12-19 RX ADMIN — KETOROLAC TROMETHAMINE 15 MG: 15 INJECTION, SOLUTION INTRAMUSCULAR; INTRAVENOUS at 18:36

## 2019-12-19 RX ADMIN — SODIUM CHLORIDE, PRESERVATIVE FREE: 5 INJECTION INTRAVENOUS at 22:54

## 2019-12-19 RX ADMIN — ROCURONIUM BROMIDE 20 MG: 10 INJECTION INTRAVENOUS at 09:00

## 2019-12-19 RX ADMIN — CEFAZOLIN 1 G: 1 INJECTION, POWDER, FOR SOLUTION INTRAMUSCULAR; INTRAVENOUS at 12:05

## 2019-12-19 RX ADMIN — ACETAMINOPHEN 650 MG: 325 TABLET, FILM COATED ORAL at 23:41

## 2019-12-19 RX ADMIN — SENNOSIDES AND DOCUSATE SODIUM 1 TABLET: 8.6; 5 TABLET ORAL at 20:25

## 2019-12-19 RX ADMIN — SODIUM CHLORIDE 500 ML: 9 INJECTION, SOLUTION INTRAVENOUS at 23:10

## 2019-12-19 RX ADMIN — MIDAZOLAM 2 MG: 1 INJECTION INTRAMUSCULAR; INTRAVENOUS at 07:53

## 2019-12-19 RX ADMIN — ACETAMINOPHEN 650 MG: 325 TABLET, FILM COATED ORAL at 16:01

## 2019-12-19 RX ADMIN — PHENYLEPHRINE HYDROCHLORIDE 100 MCG: 10 INJECTION INTRAVENOUS at 11:52

## 2019-12-19 ASSESSMENT — MIFFLIN-ST. JEOR
SCORE: 1542.26
SCORE: 1501.44

## 2019-12-19 ASSESSMENT — ENCOUNTER SYMPTOMS: SEIZURES: 0

## 2019-12-19 NOTE — ANESTHESIA CARE TRANSFER NOTE
Patient: Krista Antonio    Procedure(s):  ROBOTIC-ASSISTED LAPAROSCOPIC RADICAL PROSTATECTOMY, BILATERAL PELVIC LYMPHADENECTOMY    Diagnosis: Prostate cancer (H) [C61]  Diagnosis Additional Information: No value filed.    Anesthesia Type:   General, ETT     Note:  Airway :Face Mask  Patient transferred to:PACU  Comments: Patient oral suctioned. Patient with spontaneous respirations and adequate tidal volumes. Patient awake and responsive. Extubated in OR to 8 L face tent. To PACU ventilating well. VSS. Report given.Handoff Report: Identifed the Patient, Identified the Reponsible Provider, Reviewed the pertinent medical history, Discussed the surgical course, Reviewed Intra-OP anesthesia mangement and issues during anesthesia, Set expectations for post-procedure period and Allowed opportunity for questions and acknowledgement of understanding      Vitals: (Last set prior to Anesthesia Care Transfer)    CRNA VITALS  12/19/2019 1239 - 12/19/2019 1318      12/19/2019             Pulse:  89    SpO2:  99 %    Resp Rate (observed):  (!) 4                Electronically Signed By: REANNA Phillips CRNA  December 19, 2019  1:18 PM

## 2019-12-19 NOTE — OP NOTE
PREPROCEDURE DIAGNOSIS: Prostate cancer.   POSTPROCEDURE DIAGNOSIS: Prostate cancer.   PROCEDURES PERFORMED: Robotic-assisted laparoscopic radical prostatectomy, bilateral pelvic lymphadenectomy  SURGEON: Spencer Kinsey MD - Present and scrubbed for the entire procedure  ASSISTANT: Jacobo Mancera MD  SECOND ASSISTANT: Claribel Nicholson  ANESTHESIA: General with endotracheal intubation.   INDICATIONS FOR PROCEDURE: Ledy Antonio is a 74 year-old gentleman with a history of [place] 4+4 = 8 adenocarcinoma of the prostate. Metastatic workup was negative. He has been counseled regarding treatment options and presents to undergo surgery.   Findings: Prostate and seminal vesicles removed without complication. Lymph nodes grossly negative. Water-tight anastomosis.  Specimens:   ID Type Source Tests Collected by Time Destination   A : Right Base Margin Tissue Prostate SURGICAL PATHOLOGY EXAM Spencer Kinsey MD 12/19/2019 10:38 AM    B : Right [place] Margin Tissue Prostate SURGICAL PATHOLOGY EXAM Spencer Kinsey MD 12/19/2019 10:39 AM    C : PROSTATE AND SEMINAL VESICLES Tissue Prostate SURGICAL PATHOLOGY EXAM Spencer Kinsey MD 12/19/2019 12:26 PM    D : RIGHT & LEFT PELVIC LYMPH NODES Tissue Lymph Node SURGICAL PATHOLOGY EXAM Spencer Kinsey MD 12/19/2019 12:27 PM      DESCRIPTION OF PROCEDURE: After fully informed voluntary consent was obtained, the patient was brought into the operating room, properly identified and placed in a dorsal lithotomy position on the table. General anesthesia was induced, endotracheal intubation performed, IV Ancef administered. The patient was then positioned appropriately on the table and all pressure points were padded and he was secured to the table with tape. Once the positioning was performed, we proceeded with shaving, prepping and draping the abdomen in the usual sterile fashion. Washburn was placed in the bladder in a sterile manner on  the field. A transverse midline incision about 2 cm above the umbilicus was made and a Veress needle introduced into the abdomen through this incision. Once the abdomen was accessed, it was confirmed using a saline drop test and the abdomen was insufflated. Once the abdomen was insufflated, additional robotic ports, 2 on the left and 1 on the right, were placed. A 12mm right-sided assistant port lateral to the robotic ports. Robot was then docked and surgery was commenced.   The sigmoid was adherent to the lateral pelvic sidewall and these adhesions were taken down sharply. A transverse incision was made posterior to the seminal vesicles. Denonvilliers' fascia was incised the rectum was mobilized way from the posterior prostate. The vas deferens on both sides was identified and divided with electrocautery and the seminal vesicles were carefully dissected out with the vasculature being controlled using Lapro clips. Once seminal vesicle and vas deferens were dissected all the way down to the prostate, attention was turned to bringing the bladder down off the anterior wall of the abdomen. Incisions were made lateral the medial umbilical ligaments and carried all the way up to the umbilicus and the urachus was divided horizontally. The bladder was mobilized away from the anterior abdominal wall and the the pelvic sidewalls on both sides. Endopelvic fascia was opened on each side and lateral prostate was dissected away from the levator musculature. An 0-Vicryl suture was placed to ligate the dorsal venous complex.  The anterior bladder neck was then incised at the base of the prostate and dissection was carried through the anterior urethra to identify the Washburn catheter. The Washburn was then pulled out through this opening in the urethra to give upward traction on the prostate. Posterior bladder neck was then divided. Attention was then turned to the prostatic pedicles. A partial bilateral nerve-sparing procedure was  performed by sharply mobilizing the neurovascular bundles away from the posterior-lateral aspect of the prostate. Partial nerve-sparing performed on the left given location of high-risk disease. Right side notable for induration and adhesive nature of neurovascular bundle. A small capsular tear was noted during from retraction during this dissection. This was ultimately repaired after specimen extraction with Monocryl suture.  Frozen sections from this area were sent to confirm no malignancy at this area and returned negative. The prostatic pedicles were then divided using clips on both sides all the way to the apex. The dorsal venous complex was then divided with cautery. The anterior urethra and apex of prostate were dissected free. Urethra was divided and prostate was free. It was placed in an endocatch bag.  Prostatic fossa was carefully inspected for hemostasis. Pelvic lymph node dissection was performed removing all alexandra tissue between the external iliac vein, the pelvic floor, inguinal ligament up to the bifurcation of the common iliac vessels on both sides. Vesicourethral anastomosis was then performed in a standard fashion using two 3-0 V-lock sutures, the tail ends of which had been tied together. The first suture was taken at the 5 o'clock position through the bladder neck and taken through the posterior urethral plate and up the left side of the urethra and bladder to the 11 o'clock position. We then brought the right sided anastomotic suture through the urethra and up the right side of the urethra and bladder to the midline. The sutures were then tied down. A final Washburn catheter was then placed and the bladder irrigated.  The anastomosis was tested with saline and found it to be leak free. An EndoCatch bag was used to retrieve the specimen and a 19 Renato drain was placed in the pelvis. Robot was undocked and ports removed. Midline camera port incision was enlarged in a transverse fashion and the  specimen retrieved. The fascia was reapproximated using interrupted figure-of-eight 0 Vicryl sutures on UR-6 needle. The skin of all the incisions was closed using running subcuticular closure with 4-0 Monocryl. Skin glue was placed.      The patient tolerated the procedure well. There were no complications. Blood loss was 100 mL. All sponge, needle and instrument counts were correct and doubly verified prior to closure. I was present and involved in the entire procedure.      Spencer Kinsey MD  Urology  [facility]  Clinic Phone [phone]

## 2019-12-19 NOTE — ANESTHESIA PREPROCEDURE EVALUATION
Anesthesia Pre-Procedure Evaluation    Patient: Krista Antonio   MRN: [id number] : 1945          Preoperative Diagnosis: Prostate cancer (H) [C61]    Procedure(s):  ROBOTIC-ASSISTED LAPAROSCOPIC RADICAL PROSTATECTOMY, BILATERAL PELVIC LYMPHADENECTOMY, POSSIBLE OPEN    Past Medical History:   Diagnosis Date     Diabetes (H)     DM 2     Past Surgical History:   Procedure Laterality Date     ENDOSCOPIC RETROGRADE CHOLANGIOPANCREATOGRAM N/A 2017    Procedure: ENDOSCOPIC RETROGRADE CHOLANGIOPANCREATOGRAM;  Surgeon: Jelly Carlton MD;  Location:  OR      UGI ENDOSCOPY W EUS N/A 2017    Procedure: COMBINED ENDOSCOPIC ULTRASOUND, ESOPHAGOSCOPY, GASTROSCOPY, DUODENOSCOPY (EGD);  Surgeon: Jelly Carlton MD;  Location:  OR     LAPAROSCOPIC CHOLECYSTECTOMY N/A 2/10/2017    Procedure: LAPAROSCOPIC CHOLECYSTECTOMY;  Surgeon: Ashwin Kemp MD;  Location:  OR     ORTHOPEDIC SURGERY Left     total hip arthroplasty     ORTHOPEDIC SURGERY Left     shoulder surgery - tendon repair     Anesthesia Evaluation     . Pt has had prior anesthetic.     No history of anesthetic complications          ROS/MED HX    ENT/Pulmonary:  - neg pulmonary ROS    (-) asthma   Neurologic:  - neg neurologic ROS    (-) seizures and Neuropathy   Cardiovascular:     (+) hypertension----. : . . . :. .      (-) CAD, syncope and irregular heartbeat/palpitations   METS/Exercise Tolerance:  >4 METS   Hematologic:  - neg hematologic  ROS      (-) anemia ( 15.5)   Musculoskeletal:   (+) arthritis,  -       GI/Hepatic:  - neg GI/hepatic ROS       Renal/Genitourinary:  - ROS Renal section negative       Endo:     (+) type II DM Not using insulin .      Psychiatric:  - neg psychiatric ROS       Infectious Disease:  - neg infectious disease ROS       Malignancy:   (+) Malignancy History of Prostate          Other:                          Physical Exam  Normal systems: cardiovascular, pulmonary and  "dental    Airway   Mallampati: II  TM distance: >3 FB  Neck ROM: full    Dental     Cardiovascular       Pulmonary             Lab Results   Component Value Date    WBC 7.5 02/10/2017    HGB 14.3 02/10/2017    HCT 42.4 02/10/2017     02/10/2017     02/10/2017    POTASSIUM 3.8 12/18/2019    CHLORIDE 104 02/10/2017    CO2 23 02/10/2017    BUN 13 02/10/2017    CR 0.76 12/18/2019     (H) 02/10/2017    NANCI 8.2 (L) 02/10/2017    ALBUMIN 3.5 02/10/2017    PROTTOTAL 6.8 02/10/2017     (HH) 02/10/2017     (H) 02/10/2017    ALKPHOS 172 (H) 02/10/2017    BILITOTAL 0.8 02/10/2017    LIPASE 75 02/10/2017    INR 0.94 02/09/2017       Preop Vitals  BP Readings from Last 3 Encounters:   11/13/19 (!) 154/94   02/10/17 143/82    Pulse Readings from Last 3 Encounters:   11/13/19 97   02/10/17 71      Resp Readings from Last 3 Encounters:   11/13/19 15   02/10/17 15    SpO2 Readings from Last 3 Encounters:   11/13/19 97%   02/10/17 96%      Temp Readings from Last 1 Encounters:   11/13/19 97.7  F (36.5  C) (Tympanic)    Ht Readings from Last 1 Encounters:   11/13/19 1.73 m (5' 8.11\")      Wt Readings from Last 1 Encounters:   11/13/19 79.8 kg (176 lb)    Estimated body mass index is 26.67 kg/m  as calculated from the following:    Height as of 11/13/19: 1.73 m (5' 8.11\").    Weight as of 11/13/19: 79.8 kg (176 lb).       Anesthesia Plan      History & Physical Review  History and physical reviewed and following examination; no interval change.    ASA Status:  2 .    NPO Status:  > 8 hours    Plan for General and ETT with Intravenous induction. Maintenance will be Inhalation.    PONV prophylaxis:  Ondansetron (or other 5HT-3) and Dexamethasone or Solumedrol  Additional equipment: 2nd IV      Postoperative Care  Postoperative pain management:  IV analgesics, Oral pain medications and Multi-modal analgesia.      Consents  Anesthetic plan, risks, benefits and alternatives discussed with:  Patient.  Use of " blood products discussed: Yes.   Use of blood products discussed with Patient.  Consented to blood products.  .                 Mundo Chang MD                    .

## 2019-12-19 NOTE — BRIEF OP NOTE
[facility]    Brief Operative Note    Pre-operative diagnosis: Prostate cancer (H) [C61]  Post-operative diagnosis Same as pre-operative diagnosis    Procedure: Procedure(s):  ROBOTIC-ASSISTED LAPAROSCOPIC RADICAL PROSTATECTOMY, BILATERAL PELVIC LYMPHADENECTOMY, POSSIBLE OPEN  Surgeon: Surgeon(s) and Role:     * Spencer Kinsey MD - Primary  Anesthesia: General   Estimated blood loss: Less than 100 ml  Drains: ROCAEL; Washburn  Specimens:   ID Type Source Tests Collected by Time Destination   A : Right Base Margin Tissue Prostate SURGICAL PATHOLOGY EXAM Spencer Kinsey MD 12/19/2019 10:38 AM    B : Right [place] Margin Tissue Prostate SURGICAL PATHOLOGY EXAM Spencer Kinsey MD 12/19/2019 10:39 AM    C : PROSTATE AND SEMINAL VESICLES, SUTURE REAPPROXIMATES CAPSULAR RETRACTION DEFECT Tissue Prostate SURGICAL PATHOLOGY EXAM Spencer Kinsey MD 12/19/2019 12:26 PM    D : RIGHT & LEFT PELVIC LYMPH NODES Tissue Lymph Node SURGICAL PATHOLOGY EXAM Spencer Kinsey MD 12/19/2019 12:27 PM      Findings:   Unremarkable case  Complications: None.  Implants: * No implants in log *

## 2019-12-19 NOTE — ANESTHESIA POSTPROCEDURE EVALUATION
Patient: Krista Antonio    Procedure(s):  ROBOTIC-ASSISTED LAPAROSCOPIC RADICAL PROSTATECTOMY, BILATERAL PELVIC LYMPHADENECTOMY    Diagnosis:Prostate cancer (H) [C61]  Diagnosis Additional Information: No value filed.    Anesthesia Type:  General, ETT    Note:  Anesthesia Post Evaluation    Patient location during evaluation: PACU  Patient participation: Able to fully participate in evaluation  Level of consciousness: awake and alert  Pain management: adequate  Airway patency: patent  Cardiovascular status: acceptable  Respiratory status: acceptable  Hydration status: acceptable  PONV: controlled             Last vitals:  Vitals:    12/19/19 0659 12/19/19 1312   BP: (!) 154/89    Resp: 16    Temp: 98  F (36.7  C) 96.9  F (36.1  C)   SpO2: 97%          Electronically Signed By: Mundo Chang MD  December 19, 2019  1:35 PM

## 2019-12-20 LAB
ANION GAP SERPL CALCULATED.3IONS-SCNC: 7 MMOL/L (ref 3–14)
BUN SERPL-MCNC: 24 MG/DL (ref 7–30)
CALCIUM SERPL-MCNC: 7.3 MG/DL (ref 8.5–10.1)
CHLORIDE SERPL-SCNC: 105 MMOL/L (ref 94–109)
CO2 SERPL-SCNC: 24 MMOL/L (ref 20–32)
CREAT FLD-MCNC: 1.1 MG/DL
CREAT SERPL-MCNC: 1.03 MG/DL (ref 0.66–1.25)
ERYTHROCYTE [DISTWIDTH] IN BLOOD BY AUTOMATED COUNT: 13.1 % (ref 10–15)
GFR SERPL CREATININE-BSD FRML MDRD: 71 ML/MIN/{1.73_M2}
GLUCOSE BLDC GLUCOMTR-MCNC: 144 MG/DL (ref 70–99)
GLUCOSE BLDC GLUCOMTR-MCNC: 146 MG/DL (ref 70–99)
GLUCOSE BLDC GLUCOMTR-MCNC: 148 MG/DL (ref 70–99)
GLUCOSE BLDC GLUCOMTR-MCNC: 154 MG/DL (ref 70–99)
GLUCOSE BLDC GLUCOMTR-MCNC: 156 MG/DL (ref 70–99)
GLUCOSE BLDC GLUCOMTR-MCNC: 177 MG/DL (ref 70–99)
GLUCOSE SERPL-MCNC: 159 MG/DL (ref 70–99)
HCT VFR BLD AUTO: 27.4 % (ref 40–53)
HGB BLD-MCNC: 8.3 G/DL (ref 13.3–17.7)
HGB BLD-MCNC: 9 G/DL (ref 13.3–17.7)
HGB BLD-MCNC: 9 G/DL (ref 13.3–17.7)
MCH RBC QN AUTO: 30 PG (ref 26.5–33)
MCHC RBC AUTO-ENTMCNC: 32.8 G/DL (ref 31.5–36.5)
MCV RBC AUTO: 91 FL (ref 78–100)
PLATELET # BLD AUTO: 236 10E9/L (ref 150–450)
POTASSIUM SERPL-SCNC: 4.1 MMOL/L (ref 3.4–5.3)
RBC # BLD AUTO: 3 10E12/L (ref 4.4–5.9)
SODIUM SERPL-SCNC: 136 MMOL/L (ref 133–144)
SPECIMEN SOURCE FLD: NORMAL
WBC # BLD AUTO: 6.9 10E9/L (ref 4–11)

## 2019-12-20 PROCEDURE — 25000128 H RX IP 250 OP 636: Performed by: UROLOGY

## 2019-12-20 PROCEDURE — 36415 COLL VENOUS BLD VENIPUNCTURE: CPT | Performed by: PHYSICIAN ASSISTANT

## 2019-12-20 PROCEDURE — 25800030 ZZH RX IP 258 OP 636: Performed by: STUDENT IN AN ORGANIZED HEALTH CARE EDUCATION/TRAINING PROGRAM

## 2019-12-20 PROCEDURE — 25800030 ZZH RX IP 258 OP 636: Performed by: UROLOGY

## 2019-12-20 PROCEDURE — 25800030 ZZH RX IP 258 OP 636: Performed by: PHYSICIAN ASSISTANT

## 2019-12-20 PROCEDURE — 80048 BASIC METABOLIC PNL TOTAL CA: CPT | Performed by: STUDENT IN AN ORGANIZED HEALTH CARE EDUCATION/TRAINING PROGRAM

## 2019-12-20 PROCEDURE — 82570 ASSAY OF URINE CREATININE: CPT | Performed by: UROLOGY

## 2019-12-20 PROCEDURE — 85027 COMPLETE CBC AUTOMATED: CPT | Performed by: STUDENT IN AN ORGANIZED HEALTH CARE EDUCATION/TRAINING PROGRAM

## 2019-12-20 PROCEDURE — 85018 HEMOGLOBIN: CPT | Performed by: PHYSICIAN ASSISTANT

## 2019-12-20 PROCEDURE — 12000000 ZZH R&B MED SURG/OB

## 2019-12-20 PROCEDURE — 25000132 ZZH RX MED GY IP 250 OP 250 PS 637: Performed by: STUDENT IN AN ORGANIZED HEALTH CARE EDUCATION/TRAINING PROGRAM

## 2019-12-20 PROCEDURE — 36415 COLL VENOUS BLD VENIPUNCTURE: CPT | Performed by: STUDENT IN AN ORGANIZED HEALTH CARE EDUCATION/TRAINING PROGRAM

## 2019-12-20 PROCEDURE — 00000146 ZZHCL STATISTIC GLUCOSE BY METER IP

## 2019-12-20 RX ORDER — FUROSEMIDE 10 MG/ML
10 INJECTION INTRAMUSCULAR; INTRAVENOUS ONCE
Status: COMPLETED | OUTPATIENT
Start: 2019-12-20 | End: 2019-12-20

## 2019-12-20 RX ADMIN — ACETAMINOPHEN 650 MG: 325 TABLET, FILM COATED ORAL at 08:01

## 2019-12-20 RX ADMIN — ACETAMINOPHEN 650 MG: 325 TABLET, FILM COATED ORAL at 17:24

## 2019-12-20 RX ADMIN — SODIUM CHLORIDE, PRESERVATIVE FREE: 5 INJECTION INTRAVENOUS at 03:57

## 2019-12-20 RX ADMIN — ACETAMINOPHEN 650 MG: 325 TABLET, FILM COATED ORAL at 23:50

## 2019-12-20 RX ADMIN — ACETAMINOPHEN 650 MG: 325 TABLET, FILM COATED ORAL at 03:56

## 2019-12-20 RX ADMIN — SENNOSIDES AND DOCUSATE SODIUM 1 TABLET: 8.6; 5 TABLET ORAL at 08:01

## 2019-12-20 RX ADMIN — LATANOPROST 1 DROP: 50 SOLUTION OPHTHALMIC at 21:11

## 2019-12-20 RX ADMIN — KETOROLAC TROMETHAMINE 15 MG: 15 INJECTION, SOLUTION INTRAMUSCULAR; INTRAVENOUS at 14:41

## 2019-12-20 RX ADMIN — KETOROLAC TROMETHAMINE 15 MG: 15 INJECTION, SOLUTION INTRAMUSCULAR; INTRAVENOUS at 08:01

## 2019-12-20 RX ADMIN — SODIUM CHLORIDE, PRESERVATIVE FREE: 5 INJECTION INTRAVENOUS at 13:19

## 2019-12-20 RX ADMIN — ACETAMINOPHEN 650 MG: 325 TABLET, FILM COATED ORAL at 21:00

## 2019-12-20 RX ADMIN — KETOROLAC TROMETHAMINE 15 MG: 15 INJECTION, SOLUTION INTRAMUSCULAR; INTRAVENOUS at 01:11

## 2019-12-20 RX ADMIN — FUROSEMIDE 10 MG: 10 INJECTION, SOLUTION INTRAVENOUS at 02:46

## 2019-12-20 RX ADMIN — SODIUM CHLORIDE, PRESERVATIVE FREE: 5 INJECTION INTRAVENOUS at 23:53

## 2019-12-20 RX ADMIN — ACETAMINOPHEN 650 MG: 325 TABLET, FILM COATED ORAL at 12:01

## 2019-12-20 RX ADMIN — KETOROLAC TROMETHAMINE 15 MG: 15 INJECTION, SOLUTION INTRAMUSCULAR; INTRAVENOUS at 21:00

## 2019-12-20 ASSESSMENT — ACTIVITIES OF DAILY LIVING (ADL)
ADLS_ACUITY_SCORE: 13
ADLS_ACUITY_SCORE: 13

## 2019-12-20 NOTE — PLAN OF CARE
Ambulatory status:Assist of 1   VS:B/P: 99/63, T: 96.8, P: 77, R: 16   CV:WDL  Neuro:A&O x4  Pain: MAX 6/10, Toradol and tylenol given with relief  Resp:WDL  GI:BS hypoactive, no BM, passing flatus  Diet: Tolerating clear liquids, diet advanced for morning  :Washburn in place, urine dark  Skin:Lap site dermabond, sites WDL  Disposition:Probable discharge tomorrow

## 2019-12-20 NOTE — PLAN OF CARE
VSS. Rating pain 2/10 due to parker. Hypoactive bowel sounds, abdomen firm. Lap sites c/d/I, dermabonded. ROCAEL 20ml bloody output. Was having low output, received order for lasix, 900ml output total-turning to clear yellow. Tolerated snacks from fridge throughout night, feeling hungry for breakfast. Continues on ivf. Ambulate SBA. Hopeful to discharge home today.

## 2019-12-20 NOTE — PROGRESS NOTES
[facility] Urology Progress Note          Assessment and Plan:   Principal Problem:    Prostate cancer (H)    Assessment: POD 1 Robotic-assisted laparoscopic radical prostatectomy, bilateral pelvic lymphadenectomy  Acute blood loss anemia  (15.5--> 9.0)      Plan:   Ambulate, IS  JUN  Recheck Hgb at 11a.  Path pend    Dispo: late this afternoon vs tomorrow pending ability to meet post op milestones as well as recheck of Hgb.     Reviewed with RN and Dr. Pope.       Zeinab Sebastian PA-C  [facility]  [phone]               Interval History:   doing well; no cp, sob, n/v/d, or abd pain. Tired. Washburn clear, ROCAEL output down              Review of Systems:   The 5 point Review of Systems is negative other than noted in the HPI             Medications:     Current Facility-Administered Medications Ordered in Epic   Medication Dose Route Frequency Last Rate Last Dose     acetaminophen (TYLENOL) tablet 650 mg  650 mg Oral Q4H   650 mg at 12/20/19 0801     glucose gel 15-30 g  15-30 g Oral Q15 Min PRN        Or     dextrose 50 % injection 25-50 mL  25-50 mL Intravenous Q15 Min PRN        Or     glucagon injection 1 mg  1 mg Subcutaneous Q15 Min PRN         HYDROmorphone (PF) (DILAUDID) injection 0.3 mg  0.3 mg Intravenous Q4H PRN         insulin aspart (NovoLOG) inj (RAPID ACTING)  1-10 Units Subcutaneous TID AC   1 Units at 12/20/19 0759     insulin aspart (NovoLOG) inj (RAPID ACTING)  1-7 Units Subcutaneous At Bedtime         ketorolac (TORADOL) injection 15 mg  15 mg Intravenous Q6H   15 mg at 12/20/19 0801     latanoprost (XALATAN) 0.005 % ophthalmic solution 1 drop  1 drop Both Eyes At Bedtime         lidocaine (LMX4) cream   Topical Q1H PRN         lidocaine 1 % 0.1-1 mL  0.1-1 mL Other Q1H PRN         naloxone (NARCAN) injection 0.1-0.4 mg  0.1-0.4 mg Intravenous Q2 Min PRN         neomycin-bacitracin-polymyxin (NEOSPORIN) ointment   Topical 4x Daily PRN         ondansetron (ZOFRAN-ODT) ODT  tab 4 mg  4 mg Oral Q6H PRN        Or     ondansetron (ZOFRAN) injection 4 mg  4 mg Intravenous Q6H PRN         oxyCODONE (ROXICODONE) tablet 5-10 mg  5-10 mg Oral Q4H PRN         rosuvastatin (CRESTOR) tablet 10 mg  10 mg Oral QPM         senna-docusate (SENOKOT-S/PERICOLACE) 8.6-50 MG per tablet 1 tablet  1 tablet Oral BID   1 tablet at 12/20/19 0801     sodium chloride (PF) 0.9% PF flush 3 mL  3 mL Intracatheter q1 min prn         sodium chloride (PF) 0.9% PF flush 3 mL  3 mL Intracatheter Q8H   3 mL at 12/19/19 1558     sodium chloride 0.9% infusion   Intravenous Continuous 100 mL/hr at 12/20/19 0357       No current Epic-ordered outpatient medications on file.                  Physical Exam:   Vitals were reviewed  Patient Vitals for the past 8 hrs:   BP Heart Rate SpO2   12/20/19 0356 -- -- 95 %   12/20/19 0224 110/55 74 --     GEN: NAD, lying in bed  EYES: EOMI  MOUTH: MMM  NECK: Supple  RESP: Unlabored breathing  CARDIAC: No LE edema  SKIN: Warm  ABD: soft, ROCAEL sang, inc c/d/i, tympany thoroughout  NEURO: AAO  :  Clear         Data:   No results found for: NTBNPI, NTBNP  Lab Results   Component Value Date    WBC 6.9 12/20/2019    WBC 7.5 02/10/2017    WBC 5.0 02/09/2017    HGB 9.0 (L) 12/20/2019    HGB 14.3 02/10/2017    HGB 14.5 02/09/2017    HCT 27.4 (L) 12/20/2019    HCT 42.4 02/10/2017    HCT 42.5 02/09/2017    MCV 91 12/20/2019    MCV 89 02/10/2017    MCV 88 02/09/2017     12/20/2019     02/10/2017     02/09/2017     Lab Results   Component Value Date    INR 0.94 02/09/2017

## 2019-12-20 NOTE — PROVIDER NOTIFICATION
Mary garnett re: low urine output    Received telephone order from on-call urologist for 10mg IV lasix

## 2019-12-20 NOTE — PLAN OF CARE
Alert and oriented. Soft BP. A1 for mobility. Writer received call at approximately 1000 that there was a lot of bleeding from ROCAEL site. Went in and assessed patient. Copious amount of dark red drainage that saturated dressing and went down patient's leg. Reinforced dressing. Patient complained of being light headed when ambulated to bed from toilet. Paged Zeinab MARTINO and updated on situation. OK to reinforce dressing and check vitals. Competed. Hemoglobin check at 1100, stable. Ice to abd for discomfort.  IVF. Wife and daughter in-law present. ROCAEL with large amount of output, sample sent to lab. Capnography removed. Low urine output, swelling and abd taught Zeinab MARTINO aware. No need for lasix. PA changed ROCAEL dressing site, supplies in room. IS instructions provided.

## 2019-12-20 NOTE — PROGRESS NOTES
"PRIMARY DIAGNOSIS: \"GENERIC\" NURSING  OUTPATIENT/OBSERVATION GOALS TO BE MET BEFORE DISCHARGE:  1. ADLs back to baseline: No    2. Activity and level of assistance: Up with standby assistance.    3. Pain status: Improved-controlled with oral pain medications.    4. Return to near baseline physical activity: No     Discharge Planner Nurse   Safe discharge environment identified: Yes  Barriers to discharge: Yes       Entered by: Bryan Christensen 12/19/2019 6:59 PM     Please review provider order for any additional goals.   Nurse to notify provider when observation goals have been met and patient is ready for discharge.    Currently POD #0. Pain controlled with Toradol, tylenol. Bowel sounds Hypoactive. Passing gas.  Denies Nausea. Currently on Clear liquid diet. IVF . Incision WDL. Ambulating with Assist of 1.   "

## 2019-12-21 VITALS
SYSTOLIC BLOOD PRESSURE: 141 MMHG | HEART RATE: 108 BPM | RESPIRATION RATE: 20 BRPM | OXYGEN SATURATION: 96 % | BODY MASS INDEX: 27.43 KG/M2 | TEMPERATURE: 98.5 F | HEIGHT: 68 IN | DIASTOLIC BLOOD PRESSURE: 73 MMHG | WEIGHT: 181 LBS

## 2019-12-21 LAB
ANION GAP SERPL CALCULATED.3IONS-SCNC: 4 MMOL/L (ref 3–14)
BUN SERPL-MCNC: 14 MG/DL (ref 7–30)
CALCIUM SERPL-MCNC: 7.3 MG/DL (ref 8.5–10.1)
CHLORIDE SERPL-SCNC: 112 MMOL/L (ref 94–109)
CO2 SERPL-SCNC: 24 MMOL/L (ref 20–32)
CREAT SERPL-MCNC: 0.77 MG/DL (ref 0.66–1.25)
ERYTHROCYTE [DISTWIDTH] IN BLOOD BY AUTOMATED COUNT: 13.2 % (ref 10–15)
GFR SERPL CREATININE-BSD FRML MDRD: 90 ML/MIN/{1.73_M2}
GLUCOSE BLDC GLUCOMTR-MCNC: 114 MG/DL (ref 70–99)
GLUCOSE BLDC GLUCOMTR-MCNC: 150 MG/DL (ref 70–99)
GLUCOSE SERPL-MCNC: 128 MG/DL (ref 70–99)
HCT VFR BLD AUTO: 23.6 % (ref 40–53)
HGB BLD-MCNC: 7.5 G/DL (ref 13.3–17.7)
HGB BLD-MCNC: 7.9 G/DL (ref 13.3–17.7)
MCH RBC QN AUTO: 29.2 PG (ref 26.5–33)
MCHC RBC AUTO-ENTMCNC: 31.8 G/DL (ref 31.5–36.5)
MCV RBC AUTO: 92 FL (ref 78–100)
PLATELET # BLD AUTO: 212 10E9/L (ref 150–450)
POTASSIUM SERPL-SCNC: 3.9 MMOL/L (ref 3.4–5.3)
RBC # BLD AUTO: 2.57 10E12/L (ref 4.4–5.9)
SODIUM SERPL-SCNC: 140 MMOL/L (ref 133–144)
WBC # BLD AUTO: 5.9 10E9/L (ref 4–11)

## 2019-12-21 PROCEDURE — 00000146 ZZHCL STATISTIC GLUCOSE BY METER IP

## 2019-12-21 PROCEDURE — 36415 COLL VENOUS BLD VENIPUNCTURE: CPT | Performed by: UROLOGY

## 2019-12-21 PROCEDURE — 25000128 H RX IP 250 OP 636: Performed by: UROLOGY

## 2019-12-21 PROCEDURE — 25000132 ZZH RX MED GY IP 250 OP 250 PS 637: Performed by: STUDENT IN AN ORGANIZED HEALTH CARE EDUCATION/TRAINING PROGRAM

## 2019-12-21 PROCEDURE — 80048 BASIC METABOLIC PNL TOTAL CA: CPT | Performed by: UROLOGY

## 2019-12-21 PROCEDURE — 85027 COMPLETE CBC AUTOMATED: CPT | Performed by: UROLOGY

## 2019-12-21 PROCEDURE — 85018 HEMOGLOBIN: CPT | Performed by: UROLOGY

## 2019-12-21 RX ORDER — OXYCODONE HYDROCHLORIDE 5 MG/1
5-10 TABLET ORAL EVERY 4 HOURS PRN
Qty: 10 TABLET | Refills: 0 | Status: SHIPPED | OUTPATIENT
Start: 2019-12-21 | End: 2021-05-07

## 2019-12-21 RX ORDER — AMOXICILLIN 250 MG
1 CAPSULE ORAL 2 TIMES DAILY PRN
Qty: 20 TABLET | Refills: 0 | Status: SHIPPED | OUTPATIENT
Start: 2019-12-21 | End: 2021-05-07

## 2019-12-21 RX ADMIN — ACETAMINOPHEN 650 MG: 325 TABLET, FILM COATED ORAL at 04:46

## 2019-12-21 RX ADMIN — ACETAMINOPHEN 650 MG: 325 TABLET, FILM COATED ORAL at 08:19

## 2019-12-21 RX ADMIN — KETOROLAC TROMETHAMINE 15 MG: 15 INJECTION, SOLUTION INTRAMUSCULAR; INTRAVENOUS at 02:46

## 2019-12-21 RX ADMIN — KETOROLAC TROMETHAMINE 15 MG: 15 INJECTION, SOLUTION INTRAMUSCULAR; INTRAVENOUS at 08:19

## 2019-12-21 RX ADMIN — KETOROLAC TROMETHAMINE 15 MG: 15 INJECTION, SOLUTION INTRAMUSCULAR; INTRAVENOUS at 14:07

## 2019-12-21 RX ADMIN — ACETAMINOPHEN 650 MG: 325 TABLET, FILM COATED ORAL at 12:24

## 2019-12-21 RX ADMIN — ACETAMINOPHEN 650 MG: 325 TABLET, FILM COATED ORAL at 15:53

## 2019-12-21 ASSESSMENT — ACTIVITIES OF DAILY LIVING (ADL)
ADLS_ACUITY_SCORE: 13

## 2019-12-21 NOTE — PLAN OF CARE
POD 2. Alert and oriented. VSS. Pain managed with scheduled medications. IND for mobility, ambulating in halls. IV saline locked. Emptied parker and ROCAEL with supervision. ROCAEL dressing CDI. Hemoglobin stable. Discharge this afternoon. Multiple visitors present. Paged MD regarding patients request to go home.

## 2019-12-21 NOTE — PROGRESS NOTES
Urology   No acute events overnight  Pain well controlled.  Tolerating regular diet.  No n/v.  Passing flatus. Having some diarrhea this AM.  Ambulating.  Being monitored for blood loss anemia post-operatively. Drain output has slowed. HGB stable at 7.9 this PM.    UOP 2265 + 725  ROCAEL 400 + 30    AVSS  NAD  Abd soft.  NT.  ND.  Inc CDI.  No erythema or drainage.  Washburn draining clear, yellow urine.  ROCAEL serosang  Ext NT, no edema    A/P 74 year old male POD #2 s/p RALP.    - Pain control: PO pain meds  - Diet: as tolerated  -  drains/tubes: OK to remove drain; will discharge home with Washburn  - Dispo: home today or tomorrow pending stable HGB and pain control    Spencer Kinsey MD  Urology  [facility]  Pager [phone]

## 2019-12-21 NOTE — PLAN OF CARE
Patient A&Ox4, SBA, VSS, scheduled Tylenol and IV Toradol given for pain, sats 95% RA. Education on  Parker catheter bag change completed with patient eager to learn. ROCAEL in place, dressing CDI. +BS, passing flatus, had x3 BM, parker catheter in place.Tolerating high carb diet. Hg 8.3, , 177 Plan to discharge home with Parker catheter tomorrow.

## 2019-12-21 NOTE — PLAN OF CARE
Ambulatory Status:  Pt up standby.  VS:  VSS  Pain:  5/10 pain managed by Tylenol and Tordal   Resp: LS Lung sounds clear  GI:  Pt denies nausea.  good appetite and on Carb Control diet.  .  Passing flatus.  Last BM 12/21/19.  X3 incision sites with dermabond.  ROCAEL drain.  :  Washburn catheter

## 2019-12-23 LAB — COPATH REPORT: NORMAL

## 2019-12-24 NOTE — DISCHARGE SUMMARY
[facility] Discharge Summary    Patient: Krista Antonio    MRN: [id number]   : 1945         Date of Admission:  2019  Date of Discharge::  2019  5:48 PM  Admitting Physician:  Spencer Kinsey  Discharge Physician:  Spencer Kinsey MD             Admission Diagnoses:   Prostate Cancer  Past Medical History:   Diagnosis Date     Diabetes (H)     DM 2            Discharge Diagnosis:   Prostate Cancer  Past Medical History:   Diagnosis Date     Diabetes (H)     DM 2           Procedures:   Procedure(s): Robotic assisted laparoscopic radical prostatectomy              Discharge Medications:     Discharge Medication List as of 2019  4:49 PM      START taking these medications    Details   oxyCODONE (ROXICODONE) 5 MG tablet Take 1-2 tablets (5-10 mg) by mouth every 4 hours as needed for moderate to severe pain, Disp-10 tablet, R-0, E-Prescribe      senna-docusate (SENOKOT-S/PERICOLACE) 8.6-50 MG tablet Take 1 tablet by mouth 2 times daily as needed for constipation, Disp-20 tablet, R-0, E-Prescribe         CONTINUE these medications which have NOT CHANGED    Details   ASPIRIN EC PO Take 81 mg by mouth every evening , Historical      Coenzyme Q10 (COQ10 PO) Take 1 tablet by mouth every evening , Historical      latanoprost (XALATAN) 0.005 % ophthalmic solution PLACE 1 DROP INTO BOTH EYES EVERY NIGHT AT BEDTIME, R-10, Historical      metFORMIN (GLUCOPHAGE-XR) 500 MG 24 hr tablet Take 1,000 mg by mouth daily (with dinner) , Historical      multivitamin, therapeutic (THERA-VIT) TABS tablet Take 1 tablet by mouth every evening , Historical      Rosuvastatin Calcium (CRESTOR PO) Take 10 mg by mouth every evening , Historical                   Consultations:   None          Brief History of Illness:   74 year old with intermediate risk prostate cancer. Elected for prostatectomy.           Hospital Course:   The patient was admitted to the hospital and underwent the above named procedures.   For specific details, please refer to the dictated operative report in the patient's chart.  In summary, the patient tolerated the procedure well and there were no intraoperative complications.  Following the procedure the patient was admitted to the floor for routine post operative care.      The patient's overall hospital course was uneventful, and they remained hemodynamically stable, and afebrile for the hospital stay. He did have a drift in hemoglobin with acute blood loss anemia. This was observed and managed conservatively. Hemoglobin stabilized without need for transfusion.  Diet was able to be advanced as tolerated to a regular diet, which was tolerated well before discharge. Patient also had return of antegrade bowel function prior to discharging from the hospital.  Labs also remained stable.  At this time the patient was determined stable for discharge from the hospital.            Discharge Instructions and Follow-Up:   Discharge diet: Regular   Discharge activity: No lifting or strenuous exercise for 6 week(s)   Discharge follow-up: Follow up with Zeinab Sebastian in 7 days for Washburn removal   Wound care: Keep wound clean and dry  Keep Washburn catheter in for 7-10 days  May get incision wet in shower but do not soak or scrub           Discharge Disposition:   Discharged to home      Attestation: I have reviewed today's vital signs, notes, medications, labs and imaging.    Spencer Kinsey MD  December 24, 2019

## 2019-12-27 ENCOUNTER — HOSPITAL ENCOUNTER (OUTPATIENT)
Dept: LAB | Facility: CLINIC | Age: 74
Discharge: HOME | End: 2019-12-27
Attending: PHYSICIAN ASSISTANT | Admitting: PHYSICIAN ASSISTANT
Payer: COMMERCIAL

## 2019-12-27 ENCOUNTER — OFFICE VISIT (OUTPATIENT)
Dept: UROLOGY | Facility: CLINIC | Age: 74
End: 2019-12-27
Payer: COMMERCIAL

## 2019-12-27 VITALS — WEIGHT: 168 LBS | BODY MASS INDEX: 24.88 KG/M2 | HEIGHT: 69 IN | HEART RATE: 100 BPM

## 2019-12-27 DIAGNOSIS — C61 PROSTATE CANCER (H): Primary | ICD-10-CM

## 2019-12-27 DIAGNOSIS — D62 ANEMIA DUE TO BLOOD LOSS, ACUTE: ICD-10-CM

## 2019-12-27 DIAGNOSIS — D62 ANEMIA DUE TO BLOOD LOSS, ACUTE: Primary | ICD-10-CM

## 2019-12-27 LAB — HGB BLD-MCNC: 10 G/DL (ref 13.3–17.7)

## 2019-12-27 PROCEDURE — 36415 COLL VENOUS BLD VENIPUNCTURE: CPT | Performed by: PHYSICIAN ASSISTANT

## 2019-12-27 PROCEDURE — 99024 POSTOP FOLLOW-UP VISIT: CPT | Performed by: PHYSICIAN ASSISTANT

## 2019-12-27 PROCEDURE — 85018 HEMOGLOBIN: CPT | Performed by: PHYSICIAN ASSISTANT

## 2019-12-27 ASSESSMENT — PAIN SCALES - GENERAL: PAINLEVEL: EXTREME PAIN (9)

## 2019-12-27 ASSESSMENT — MIFFLIN-ST. JEOR: SCORE: 1497.42

## 2019-12-27 NOTE — LETTER
12/27/2019       RE: Krista Antonio  [address] [facility] [address]     Dear Colleague,    Thank you for referring your patient, Krista Antonio, to the [facility]OLOG[facility] [place] at [facility]. Please see a copy of my visit note below.    CC: post op    HPI:  Krista Antonio is a 74 year old male who presents for Washburn removal. Underwent ROBOTIC-ASSISTED LAPAROSCOPIC RADICAL PROSTATECTOMY, BILATERAL PELVIC LYMPHADENECTOMY 12/19/19.  Recovering well.     Past Medical History:   Diagnosis Date     Diabetes (H)     DM 2       Past Surgical History:   Procedure Laterality Date     DAVINCI PROSTATECTOMY, LYMPHADENECTOMY Bilateral 12/19/2019    Procedure: ROBOTIC-ASSISTED LAPAROSCOPIC RADICAL PROSTATECTOMY, BILATERAL PELVIC LYMPHADENECTOMY;  Surgeon: Spencer Kinsey MD;  Location:  OR     ENDOSCOPIC RETROGRADE CHOLANGIOPANCREATOGRAM N/A 2/9/2017    Procedure: ENDOSCOPIC RETROGRADE CHOLANGIOPANCREATOGRAM;  Surgeon: Jelly Carlton MD;  Location:  OR      UGI ENDOSCOPY W EUS N/A 2/9/2017    Procedure: COMBINED ENDOSCOPIC ULTRASOUND, ESOPHAGOSCOPY, GASTROSCOPY, DUODENOSCOPY (EGD);  Surgeon: Jelly Carlton MD;  Location:  OR     LAPAROSCOPIC CHOLECYSTECTOMY N/A 2/10/2017    Procedure: LAPAROSCOPIC CHOLECYSTECTOMY;  Surgeon: Ashwin Kemp MD;  Location:  OR     ORTHOPEDIC SURGERY Left 2016    total hip arthroplasty     ORTHOPEDIC SURGERY Left 2004    shoulder surgery - tendon repair       Social History     Socioeconomic History     Marital status:      Spouse name: Not on file     Number of children: Not on file     Years of education: Not on file     Highest education level: Not on file   Occupational History     Not on file   Social Needs     Financial resource strain: Not on file     Food insecurity:     Worry: Not on file     Inability: Not on file     Transportation  needs:     Medical: Not on file     Non-medical: Not on file   Tobacco Use     Smoking status: Never Smoker     Smokeless tobacco: Never Used   Substance and Sexual Activity     Alcohol use: Yes     Comment: 0-1 drinks per month     Drug use: Never     Sexual activity: Not on file   Lifestyle     Physical activity:     Days per week: Not on file     Minutes per session: Not on file     Stress: Not on file   Relationships     Social connections:     Talks on phone: Not on file     Gets together: Not on file     Attends Advent service: Not on file     Active member of club or organization: Not on file     Attends meetings of clubs or organizations: Not on file     Relationship status: Not on file     Intimate partner violence:     Fear of current or ex partner: Not on file     Emotionally abused: Not on file     Physically abused: Not on file     Forced sexual activity: Not on file   Other Topics Concern     Parent/sibling w/ CABG, MI or angioplasty before 65F 55M? Not Asked   Social History Narrative     Not on file       No family history on file.    ROS:14 point ROS neg other than the symptoms noted above in the HPI.    Allergies   Allergen Reactions     Atorvastatin      PN: arthralgias     Simvastatin      PN: arthralgias     Sulfa Drugs Rash       Current Outpatient Medications   Medication     ASPIRIN EC PO     Coenzyme Q10 (COQ10 PO)     latanoprost (XALATAN) 0.005 % ophthalmic solution     metFORMIN (GLUCOPHAGE-XR) 500 MG 24 hr tablet     multivitamin, therapeutic (THERA-VIT) TABS tablet     oxyCODONE (ROXICODONE) 5 MG tablet     Rosuvastatin Calcium (CRESTOR PO)     senna-docusate (SENOKOT-S/PERICOLACE) 8.6-50 MG tablet     No current facility-administered medications for this visit.          PEx:   There were no vitals taken for this visit.    PSYCH: NAD  EYES: EOMI  MOUTH: MMM  NECK: Supple, no notable adenopathy  RESP: Unlabored breathing  CARDIAC: No LE edema  SKIN: Warm, no rashes  ABD: soft,  Nontender, inc c/d/i  NEURO: AAO x3  : Clear, balloon deflated and removed intact.     HGB: 10.0    A/P: Krista Ledy Antonio is a 74 year old male s/p RALP, pelvic LND  -Continue with post-op restrictions  -Expectations reviewed  -Hgb improved  -Consider pelvic floor PT  -See Dr. Kinsey in 3 months and PSA prior     Zeinab Sebastian PA-C  [facility]    10 minutes were spent with the patient today, > 50% in counseling and coordination of care                  Again, thank you for allowing me to participate in the care of your patient.      Sincerely,    Zeinab Sebastian PA-C, PANESTOR

## 2019-12-27 NOTE — PATIENT INSTRUCTIONS
Please see one of the dedicated pelvic floor physical therapists ([facility] [phone]).    Please be aware that coverage of these services is subject to the terms and limitations of your health insurance plan.  Call member services at your health plan with any benefit or coverage questions.      Please bring the following to your appointment:    *Your personal calendar for scheduling future appointments  *Comfortable clothing

## 2019-12-27 NOTE — PROGRESS NOTES
CC: post op    HPI:  Krista Antonio is a 74 year old male who presents for Washburn removal. Underwent ROBOTIC-ASSISTED LAPAROSCOPIC RADICAL PROSTATECTOMY, BILATERAL PELVIC LYMPHADENECTOMY 12/19/19.  Recovering well.     Past Medical History:   Diagnosis Date     Diabetes (H)     DM 2       Past Surgical History:   Procedure Laterality Date     DAVINCI PROSTATECTOMY, LYMPHADENECTOMY Bilateral 12/19/2019    Procedure: ROBOTIC-ASSISTED LAPAROSCOPIC RADICAL PROSTATECTOMY, BILATERAL PELVIC LYMPHADENECTOMY;  Surgeon: Spencer Kinsey MD;  Location: [facility] OR     ENDOSCOPIC RETROGRADE CHOLANGIOPANCREATOGRAM N/A 2/9/2017    Procedure: ENDOSCOPIC RETROGRADE CHOLANGIOPANCREATOGRAM;  Surgeon: Jelly Carlton MD;  Location:  OR     HC UGI ENDOSCOPY W EUS N/A 2/9/2017    Procedure: COMBINED ENDOSCOPIC ULTRASOUND, ESOPHAGOSCOPY, GASTROSCOPY, DUODENOSCOPY (EGD);  Surgeon: Jelly Carlton MD;  Location:  OR     LAPAROSCOPIC CHOLECYSTECTOMY N/A 2/10/2017    Procedure: LAPAROSCOPIC CHOLECYSTECTOMY;  Surgeon: Ashwin Kemp MD;  Location:  OR     ORTHOPEDIC SURGERY Left 2016    total hip arthroplasty     ORTHOPEDIC SURGERY Left 2004    shoulder surgery - tendon repair       Social History     Socioeconomic History     Marital status:      Spouse name: Not on file     Number of children: Not on file     Years of education: Not on file     Highest education level: Not on file   Occupational History     Not on file   Social Needs     Financial resource strain: Not on file     Food insecurity:     Worry: Not on file     Inability: Not on file     Transportation needs:     Medical: Not on file     Non-medical: Not on file   Tobacco Use     Smoking status: Never Smoker     Smokeless tobacco: Never Used   Substance and Sexual Activity     Alcohol use: Yes     Comment: 0-1 drinks per month     Drug use: Never     Sexual activity: Not on file   Lifestyle     Physical activity:     Days per  week: Not on file     Minutes per session: Not on file     Stress: Not on file   Relationships     Social connections:     Talks on phone: Not on file     Gets together: Not on file     Attends Mu-ism service: Not on file     Active member of club or organization: Not on file     Attends meetings of clubs or organizations: Not on file     Relationship status: Not on file     Intimate partner violence:     Fear of current or ex partner: Not on file     Emotionally abused: Not on file     Physically abused: Not on file     Forced sexual activity: Not on file   Other Topics Concern     Parent/sibling w/ CABG, MI or angioplasty before 65F 55M? Not Asked   Social History Narrative     Not on file       No family history on file.    ROS:14 point ROS neg other than the symptoms noted above in the HPI.    Allergies   Allergen Reactions     Atorvastatin      PN: arthralgias     Simvastatin      PN: arthralgias     Sulfa Drugs Rash       Current Outpatient Medications   Medication     ASPIRIN EC PO     Coenzyme Q10 (COQ10 PO)     latanoprost (XALATAN) 0.005 % ophthalmic solution     metFORMIN (GLUCOPHAGE-XR) 500 MG 24 hr tablet     multivitamin, therapeutic (THERA-VIT) TABS tablet     oxyCODONE (ROXICODONE) 5 MG tablet     Rosuvastatin Calcium (CRESTOR PO)     senna-docusate (SENOKOT-S/PERICOLACE) 8.6-50 MG tablet     No current facility-administered medications for this visit.          PEx:   There were no vitals taken for this visit.    PSYCH: NAD  EYES: EOMI  MOUTH: MMM  NECK: Supple, no notable adenopathy  RESP: Unlabored breathing  CARDIAC: No LE edema  SKIN: Warm, no rashes  ABD: soft, Nontender, inc c/d/i  NEURO: AAO x3  : Clear, balloon deflated and removed intact.     HGB: 10.0    A/P: Krista Ledy Antonio is a 74 year old male s/p RALP, pelvic LND  -Continue with post-op restrictions  -Expectations reviewed  -Hgb improved  -Consider pelvic floor PT  -See Dr. Kinsey in 3 months and PSA prior     Zeinab Sebastian  MORIAH  [facility]    10 minutes were spent with the patient today, > 50% in counseling and coordination of care

## 2020-01-08 ENCOUNTER — THERAPY VISIT (OUTPATIENT)
Dept: PHYSICAL THERAPY | Facility: CLINIC | Age: 75
End: 2020-01-08
Attending: PHYSICIAN ASSISTANT
Payer: COMMERCIAL

## 2020-01-08 DIAGNOSIS — N39.46 MIXED INCONTINENCE URGE AND STRESS (MALE)(FEMALE): ICD-10-CM

## 2020-01-08 DIAGNOSIS — M99.05 SOMATIC DYSFUNCTION OF PELVIS REGION: ICD-10-CM

## 2020-01-08 DIAGNOSIS — C61 PROSTATE CANCER (H): ICD-10-CM

## 2020-01-08 PROCEDURE — 97161 PT EVAL LOW COMPLEX 20 MIN: CPT | Mod: GP | Performed by: PHYSICAL THERAPIST

## 2020-01-08 PROCEDURE — 97112 NEUROMUSCULAR REEDUCATION: CPT | Mod: GP | Performed by: PHYSICAL THERAPIST

## 2020-01-08 PROCEDURE — 97110 THERAPEUTIC EXERCISES: CPT | Mod: GP | Performed by: PHYSICAL THERAPIST

## 2020-01-08 NOTE — PROGRESS NOTES
[facility] Initial Evaluation  Subjective:      SUBJECTIVE:  Patient is s/p prostatectomy on 12-19-19 Catheter was removed on 12-27-19.  Urination:  Do you feel the sensation of your bladder filling? Yes  How many pads per day are you using? Depends 2 , pads 4-6. Do you leak on the way to the bathroom or with a strong urge to void? Yes  Do you leak with cough,sneeze, jumping, running? Yes  Do you have triggers that make you feel you can't wait to go to the bathroom? no   How long can you delay the need to urinate? 1 - 2 minutes.   How many times do you get up to urinate at night?   Can you stop the flow of urine when on the toilet? Yes  Is the volume of urine passed usually: medium. (8sec rule= 250ml with average bladder storing 400-600ml)  Do you strain to pass urine? Yes  Do you have a slow or hesitant urinary stream? Yes  Do you have difficulty initiating the urine stream? No  Fluid intake(one glass is 8oz or one cup) 4 glasses/day, 2 caffinated glasses/day  0 alcohol glasses/day.        The history is provided by the patient. No  was used.   Type of problem:  Incontinence    This is a new condition   Problem details: Pt rates pain 2/10.   Patient reports pain:  Groin.   Symptoms are exacerbated by coughing, laughing and sneezing (walking, transfers) Relieved by: urinating.                      Objective:  System                                 Pelvic Dysfunction Evaluation:      Diagnostic Tests:  Diagnostic tests pelvic: see report.                                  External Assessment:  External assessment pelvic: scars healing, no discharge.      Bearing Down/Coughing:  Normal        Internal Assessment:    Sensory Exam:  Normal  Contraction/Grade:  Fair squeeze, definite lift (3)  Accessory Muscle use-Abdominals:  Yes   Accessory Muscle use-Gluteals:  Yes  Accessory Muscle use-Adductors:  Yes    SEMG Biofeedback:    Equipment:  Biofeedback    Suraface electrode  placement--Perianal:  Bilateral  Baseline EMG PM:  4 mV    Peak pelvic muscle contraction:  12 mV    EMG interpretation to fatigue:  3-5 seconds  Position:  SupineAdditional History:        Caffeine Consumption:  2 cups of coffee                     General     ROS    Assessment/Plan:    Patient is a 74 year old male with pelvic complaints.    Patient has the following significant findings with corresponding treatment plan.                Diagnosis 1:  Post op incontinence  Decreased strength - therapeutic exercise, therapeutic activities and home program  Impaired muscle performance - biofeedback, neuro re-education and home program    Therapy Evaluation Codes:   1) History comprised of:   Personal factors that impact the plan of care:      None.    Comorbidity factors that impact the plan of care are:      Diabetes, Implanted device and Weakness.     Medications impacting care: metformin.  2) Examination of Body Systems comprised of:   Body structures and functions that impact the plan of care:      Pelvis.   Activity limitations that impact the plan of care are:      Walking, Urinary incontinence and laughing, sneezing, transfers.  3) Clinical presentation characteristics are:   Stable/Uncomplicated.  4) Decision-Making    Low complexity using standardized patient assessment instrument and/or measureable assessment of functional outcome.  Cumulative Therapy Evaluation is: Low complexity.    Previous and current functional limitations:  (See Goal Flow Sheet for this information)    Short term and Long term goals: (See Goal Flow Sheet for this information)     Communication ability:  Patient appears to be able to clearly communicate and understand verbal and written communication and follow directions correctly.  Treatment Explanation - The following has been discussed with the patient:   RX ordered/plan of care  Anticipated outcomes  Possible risks and side effects  This patient would benefit from PT intervention  to resume normal activities.   Rehab potential is good.    Frequency:  1 X week, once daily  Duration:  for 3 weeks  Discharge Plan:  Achieve all LTG.  Independent in home treatment program.  Reach maximal therapeutic benefit.    Please refer to the daily flowsheet for treatment today, total treatment time and time spent performing 1:1 timed codes.

## 2020-01-21 PROBLEM — C61 PROSTATE CANCER (H): Status: ACTIVE | Noted: 2019-11-14

## 2020-01-29 ENCOUNTER — THERAPY VISIT (OUTPATIENT)
Dept: PHYSICAL THERAPY | Facility: CLINIC | Age: 75
End: 2020-01-29
Payer: COMMERCIAL

## 2020-01-29 DIAGNOSIS — M99.05 SOMATIC DYSFUNCTION OF PELVIS REGION: ICD-10-CM

## 2020-01-29 DIAGNOSIS — N39.46 MIXED INCONTINENCE URGE AND STRESS (MALE)(FEMALE): ICD-10-CM

## 2020-01-29 PROCEDURE — 97110 THERAPEUTIC EXERCISES: CPT | Mod: GP | Performed by: PHYSICAL THERAPIST

## 2020-01-29 PROCEDURE — 97112 NEUROMUSCULAR REEDUCATION: CPT | Mod: GP | Performed by: PHYSICAL THERAPIST

## 2020-01-29 NOTE — PROGRESS NOTES
Subjective:  HPI                    Objective:  System    Physical Exam    General     ROS    Assessment/Plan:    SUBJECTIVE  Subjective changes as noted by pt:  Pt reports increased strength and decreased leaking since last visit.      Current pain level: 0/10     Changes in function:  Pt is using 1 pad during the day and 1 pad during the night     Adverse reaction to treatment or activity:  None    OBJECTIVE  Changes in objective findings:  Pt demonstrates increased strength with Kegel in sitting, supine and standing positions. Pt instructed in quick flicks to decrease urgency with urination        ASSESSMENT  Junji continues to require intervention to meet STG and LTG's: PT  Patient's symptoms are resolving.  Response to therapy has shown an improvement in  strength and incontinence  Progress made towards STG/LTG?  Yes,     PLAN  Current treatment program is being advanced to more complex exercises.    PTA/ATC plan:  N/A    Please refer to the daily flowsheet for treatment today, total treatment time and time spent performing 1:1 timed codes.

## 2020-02-19 ENCOUNTER — THERAPY VISIT (OUTPATIENT)
Dept: PHYSICAL THERAPY | Facility: CLINIC | Age: 75
End: 2020-02-19
Payer: COMMERCIAL

## 2020-02-19 DIAGNOSIS — N39.46 MIXED INCONTINENCE URGE AND STRESS (MALE)(FEMALE): ICD-10-CM

## 2020-02-19 DIAGNOSIS — M99.05 SOMATIC DYSFUNCTION OF PELVIS REGION: ICD-10-CM

## 2020-02-19 PROCEDURE — 97110 THERAPEUTIC EXERCISES: CPT | Mod: GP | Performed by: PHYSICAL THERAPIST

## 2020-02-19 PROCEDURE — 97112 NEUROMUSCULAR REEDUCATION: CPT | Mod: GP | Performed by: PHYSICAL THERAPIST

## 2020-02-19 NOTE — PROGRESS NOTES
Subjective:  HPI  Physical Exam                    Objective:  System    Physical Exam    General     ROS    Assessment/Plan:    DISCHARGE REPORT    Progress reporting period is from 1-8-20 to 2-19-20.       SUBJECTIVE  Subjective changes noted by patient:  Pt notes increased strength and decreased incontinence. Pt is not needing to go to the bathroom as frequently.       Current pain level is 0/10  .     Previous pain level was  2/10  .   Changes in function:  Pt notes decreased frequency with waking at night to urinate. Pt is waking every 4 hours at night. Pt has returned to exercising without difficulty  Adverse reaction to treatment or activity: None    OBJECTIVE  Changes noted in objective findings:  Pt demonstrates improved strength with Kegel contraction . Initial Kegel contraction 17 mV , current 19.6 mV. Pt does show fatigue at the 4 second joseph while holding a Kegel. Pt is able to maintain a Kegel contraction sitting up and sitting down from a chair.         ASSESSMENT/PLAN  Updated problem list and treatment plan: Diagnosis 1:  Post op incontinence  Decreased strength - therapeutic exercise, therapeutic activities and home program  Impaired muscle performance - biofeedback, neuro re-education and home program  STG/LTGs have been met or progress has been made towards goals:  Yes,   Assessment of Progress: The patient's condition is improving.  Self Management Plans:  Patient has been instructed in a home treatment program.  I have re-evaluated this patient and find that the nature, scope, duration and intensity of the therapy is appropriate for the medical condition of the patient.  Krista continues to require the following intervention to meet STG and LTG's:  PT intervention is no longer required to meet STG/LTG.    Recommendations:  This patient is ready to be discharged from therapy and continue their home treatment program.    Please refer to the daily flowsheet for treatment today, total treatment time  and time spent performing 1:1 timed codes.

## 2020-03-16 ENCOUNTER — HOSPITAL ENCOUNTER (OUTPATIENT)
Dept: LAB | Facility: CLINIC | Age: 75
Discharge: HOME | End: 2020-03-16
Attending: UROLOGY | Admitting: PHYSICIAN ASSISTANT
Payer: COMMERCIAL

## 2020-03-16 DIAGNOSIS — C61 PROSTATE CANCER (H): ICD-10-CM

## 2020-03-16 LAB — PSA SERPL-MCNC: 0.01 UG/L (ref 0–4)

## 2020-03-16 PROCEDURE — 36415 COLL VENOUS BLD VENIPUNCTURE: CPT | Performed by: PHYSICIAN ASSISTANT

## 2020-03-16 PROCEDURE — 84153 ASSAY OF PSA TOTAL: CPT | Performed by: PHYSICIAN ASSISTANT

## 2020-03-19 ENCOUNTER — VIRTUAL VISIT (OUTPATIENT)
Dept: UROLOGY | Facility: CLINIC | Age: 75
End: 2020-03-19
Payer: COMMERCIAL

## 2020-03-19 DIAGNOSIS — C61 PROSTATE CANCER (H): Primary | ICD-10-CM

## 2020-03-19 PROCEDURE — 99442 ZZC PHYSICIAN TELEPHONE EVALUATION 11-20 MIN: CPT | Performed by: UROLOGY

## 2020-03-19 NOTE — PROGRESS NOTES
"Krista Antonio is a 75 year old male who is being evaluated via a billable telephone visit.      The patient has been notified of following:     \"This telephone visit will be conducted via a call between you and your physician/provider. We have found that certain health care needs can be provided without the need for a physical exam.  This service lets us provide the care you need with a short phone conversation.  If a prescription is necessary we can send it directly to your pharmacy.  If lab work is needed we can place an order for that and you can then stop by our lab to have the test done at a later time.    If during the course of the call the physician/provider feels a telephone visit is not appropriate, you will not be charged for this service.\"     Krista Antonio complains of  Prostate Cancer, RALP completed 12/19/2019. Doing well. Continence improving. Pain well controlled. No significant erectile function yet.    PSA  3/16/2020 - 0.01    Tumor        Histologic Type:   Adenocarcinoma, mixed: small acinar and ductal   (Ductal: Between 15 and 20% of tumor   area).             Primary Pattern:   4 (60%)             Secondary Pattern:   3 (40%).             Total Jimbo Score:   7 (Grade group 3).     Extent        Tumor Quantitation             Proportion (p%) of prostatic tissue involved by tumor:   Tumor   involving right and left lobes and   approximately 8% of the prostate area.        Extraprostatic Extension:   Present: Focal: Left posterior inferior.        Seminal Vesicle Invasion:   Not identified     Margins:   Margins uninvolved by invasive carcinoma   Pathologic Staging (pTNM)        Primary Tumor (pT):    pT3a:  Extraprostatic extension.        Regional Lymph Nodes (pN):   pN0             Number of Lymph Nodes Examined:   11             Number of Lymph Nodes Involved:      0        Distant Metastasis (pM):    pM N/A.     I have reviewed and updated the patient's Past Medical History, " Social History, Family istory and Medication List.    ALLERGIES  Atorvastatin; Simvastatin; and Sulfa drugs    Additional provider notes: none    Assessment/Plan:  75 year old male with pT3a prostate cancer, Jimbo 3+4, s/p RALP 12/19/2019. Doing well. Will try Cialis, which he has used before. Continence improving. Discussed PSA result and importance of close surveillance.    Will try cialis  PSA in 3 months    Phone call duration: 15 minutes    Spencer Kinsey MD

## 2020-06-22 DIAGNOSIS — C61 PROSTATE CANCER (H): ICD-10-CM

## 2020-06-22 LAB — PSA SERPL-MCNC: 0.05 UG/L (ref 0–4)

## 2020-06-22 PROCEDURE — 84153 ASSAY OF PSA TOTAL: CPT | Performed by: UROLOGY

## 2020-06-22 PROCEDURE — 36415 COLL VENOUS BLD VENIPUNCTURE: CPT | Performed by: UROLOGY

## 2020-06-25 ENCOUNTER — VIRTUAL VISIT (OUTPATIENT)
Dept: UROLOGY | Facility: CLINIC | Age: 75
End: 2020-06-25
Payer: COMMERCIAL

## 2020-06-25 VITALS — WEIGHT: 172 LBS | HEIGHT: 69 IN | BODY MASS INDEX: 25.48 KG/M2

## 2020-06-25 DIAGNOSIS — C61 PROSTATE CANCER (H): Primary | ICD-10-CM

## 2020-06-25 PROCEDURE — 99213 OFFICE O/P EST LOW 20 MIN: CPT | Mod: GT | Performed by: UROLOGY

## 2020-06-25 ASSESSMENT — PAIN SCALES - GENERAL: PAINLEVEL: NO PAIN (0)

## 2020-06-25 ASSESSMENT — MIFFLIN-ST. JEOR: SCORE: 1510.57

## 2020-06-25 NOTE — LETTER
"6/25/2020       RE: Krista Antonio  [id number] [facility] [address]     Dear Colleague,    Thank you for referring your patient, Krista Antonio, to the [facility]OLOG[facility] [place] at [facility]. Please see a copy of my visit note below.    Krista Antonio is a 75 year old male who is being evaluated via a billable video visit.      The patient has been notified of following:     \"This video visit will be conducted via a call between you and your physician/provider. We have found that certain health care needs can be provided without the need for an in-person physical exam.  This service lets us provide the care you need with a video conversation.  If a prescription is necessary we can send it directly to your pharmacy.  If lab work is needed we can place an order for that and you can then stop by our lab to have the test done at a later time.    Video visits are billed at different rates depending on your insurance coverage.  Please reach out to your insurance provider with any questions.    If during the course of the call the physician/provider feels a video visit is not appropriate, you will not be charged for this service.\"    Patient has given verbal consent for Video visit? Yes     How would you like to obtain your AVS? Mail a copy      Patient would like the video invitation sent by: Text to cell phone: [phone] , but reception may be bad, so if that does not work, email [email or web address]    Will anyone else be joining your video visit? No    CHIEF COMPLAINT   It was my pleasure to see Krista Antonio who is a 75 year old male for follow-up of Prostate Cancer.      HPI   Krista Antonio is a very pleasant 75 year old male who presents with a history of prostate cancer. He is s/p RALP completed 12/19/2019. Good improvement over last couple weeks of incontinence. Out of Depends. Using pads with good " "improvement and progressing week by week. Very active with pushups, Judo, etc.    PSA  6/22/2020 - 0.05  3/16/2020 - 0.01    RALP 12/19/2019   Tumor        Histologic Type:   Adenocarcinoma, mixed: small acinar and ductal   (Ductal: Between 15 and 20% of tumor   area).             Primary Pattern:   4 (60%)             Secondary Pattern:   3 (40%).             Total Jimbo Score:   7 (Grade group 3).     Extent        Tumor Quantitation             Proportion (p%) of prostatic tissue involved by tumor:   Tumor   involving right and left lobes and   approximately 8% of the prostate area.        Extraprostatic Extension:   Present: Focal: Left posterior inferior.        Seminal Vesicle Invasion:   Not identified     Margins:   Margins uninvolved by invasive carcinoma     Accessory Findings        Treatment Effect on Carcinoma:   Not identified        Lymph-Vascular Invasion:   Not identified        Perineural Invasion:   Present     Pathologic Staging (pTNM)        Primary Tumor (pT):    pT3a:  Extraprostatic extension.        Regional Lymph Nodes (pN):   pN0             Number of Lymph Nodes Examined:   11             Number of Lymph Nodes Involved:      0        Distant Metastasis (pM):    pM N/A.          Allergies:   Atorvastatin; Simvastatin; and Sulfa drugs         Review of Systems:  From intake questionnaire     Skin: negative  Eyes: negative  Ears/Nose/Throat: negative  Respiratory: No shortness of breath, dyspnea on exertion, cough, or hemoptysis  Cardiovascular: No chest pain or palpitations  Gastrointestinal: negative; no nausea/vomiting, constipation or diarrhea  Genitourinary: as per HPI  Musculoskeletal: negative  Neurologic: negative  Psychiatric: negative  Hematologic/Lymphatic/Immunologic: negative  Endocrine: negative         Physical Exam:     Patient is a 75 year old  male   Vitals: Height 1.753 m (5' 9\"), weight 78 kg (172 lb).  Constitutional: Body mass index is 25.4 kg/m .  Alert, no acute " distress, oriented, conversant  Eyes: no scleral icterus; extraocular muscles intact  Respiratory: no respiratory distress, or pursed lip breathing  Musculoskeletal: normal range of motion  Skin: no notable lesions visible  Neuro: Alert, oriented, speech and mentation normal  Psych: affect and mood normal, alert and oriented to person, place and time         Assessment and Plan:     75 year old male with pT3a prostate cancer, Jimbo 3+4, s/p RALP 12/19/2019. Doing well. Continence improving but he is still not dry. Discussed PSA result, now up to 0.05 and possible risk that he has some persistent disease. We discussed the role of salvage radiotherapy in this circumstance. We also discussed, however, that we typically wait until he has achieved optional continence before any radiation. In this case, given his PSA remains extremely low and his continence continues to improve, will plan to re-assess in 3 months with another PSA.      Follow-up in 3 months with PSA    Orders  Orders Placed This Encounter   Procedures     PSA tumor marker     Video-Visit Details    Type of service:  Video Visit    Video Start Time: 4:27 PM  Video End Time: 4:42 PM    Originating Location (pt. Location): Home    Distant Location (provider location):  [facility] UROLOG[facility] [place]    Platform used for Video Visit: Ruslan Kinsey MD  Urology  [facility]

## 2020-06-25 NOTE — PROGRESS NOTES
"Krista Antonio is a 75 year old male who is being evaluated via a billable video visit.      The patient has been notified of following:     \"This video visit will be conducted via a call between you and your physician/provider. We have found that certain health care needs can be provided without the need for an in-person physical exam.  This service lets us provide the care you need with a video conversation.  If a prescription is necessary we can send it directly to your pharmacy.  If lab work is needed we can place an order for that and you can then stop by our lab to have the test done at a later time.    Video visits are billed at different rates depending on your insurance coverage.  Please reach out to your insurance provider with any questions.    If during the course of the call the physician/provider feels a video visit is not appropriate, you will not be charged for this service.\"    Patient has given verbal consent for Video visit? Yes     How would you like to obtain your AVS? Mail a copy      Patient would like the video invitation sent by: Text to cell phone: [phone] , but reception may be bad, so if that does not work, email [email or web address]    Will anyone else be joining your video visit? No    CHIEF COMPLAINT   It was my pleasure to see Krista Antonio who is a 75 year old male for follow-up of Prostate Cancer.      HPI   Krista Antonio is a very pleasant 75 year old male who presents with a history of prostate cancer. He is s/p RALP completed 12/19/2019. Good improvement over last couple weeks of incontinence. Out of Depends. Using pads with good improvement and progressing week by week. Very active with pushups, Judo, etc.    PSA  6/22/2020 - 0.05  3/16/2020 - 0.01    RALP 12/19/2019   Tumor        Histologic Type:   Adenocarcinoma, mixed: small acinar and ductal   (Ductal: Between 15 and 20% of tumor   area).             Primary Pattern:   4 (60%)             Secondary " "Pattern:   3 (40%).             Total Jimbo Score:   7 (Grade group 3).     Extent        Tumor Quantitation             Proportion (p%) of prostatic tissue involved by tumor:   Tumor   involving right and left lobes and   approximately 8% of the prostate area.        Extraprostatic Extension:   Present: Focal: Left posterior inferior.        Seminal Vesicle Invasion:   Not identified     Margins:   Margins uninvolved by invasive carcinoma     Accessory Findings        Treatment Effect on Carcinoma:   Not identified        Lymph-Vascular Invasion:   Not identified        Perineural Invasion:   Present     Pathologic Staging (pTNM)        Primary Tumor (pT):    pT3a:  Extraprostatic extension.        Regional Lymph Nodes (pN):   pN0             Number of Lymph Nodes Examined:   11             Number of Lymph Nodes Involved:      0        Distant Metastasis (pM):    pM N/A.          Allergies:   Atorvastatin; Simvastatin; and Sulfa drugs         Review of Systems:  From intake questionnaire     Skin: negative  Eyes: negative  Ears/Nose/Throat: negative  Respiratory: No shortness of breath, dyspnea on exertion, cough, or hemoptysis  Cardiovascular: No chest pain or palpitations  Gastrointestinal: negative; no nausea/vomiting, constipation or diarrhea  Genitourinary: as per HPI  Musculoskeletal: negative  Neurologic: negative  Psychiatric: negative  Hematologic/Lymphatic/Immunologic: negative  Endocrine: negative         Physical Exam:     Patient is a 75 year old  male   Vitals: Height 1.753 m (5' 9\"), weight 78 kg (172 lb).  Constitutional: Body mass index is 25.4 kg/m .  Alert, no acute distress, oriented, conversant  Eyes: no scleral icterus; extraocular muscles intact  Respiratory: no respiratory distress, or pursed lip breathing  Musculoskeletal: normal range of motion  Skin: no notable lesions visible  Neuro: Alert, oriented, speech and mentation normal  Psych: affect and mood normal, alert and oriented to " person, place and time         Assessment and Plan:     75 year old male with pT3a prostate cancer, [place] 3+4, s/p RALP 12/19/2019. Doing well. Continence improving but he is still not dry. Discussed PSA result, now up to 0.05 and possible risk that he has some persistent disease. We discussed the role of salvage radiotherapy in this circumstance. We also discussed, however, that we typically wait until he has achieved optional continence before any radiation. In this case, given his PSA remains extremely low and his continence continues to improve, will plan to re-assess in 3 months with another PSA.      Follow-up in 3 months with PSA    Orders  Orders Placed This Encounter   Procedures     PSA tumor marker     Video-Visit Details    Type of service:  Video Visit    Video Start Time: 4:27 PM  Video End Time: 4:42 PM    Originating Location (pt. Location): Home    Distant Location (provider location):  [facility] UROLOGY CLIN[facility] [place]    Platform used for Video Visit: Ruslan Kinsey MD  Urology  [facility]

## 2020-06-25 NOTE — NURSING NOTE
Chief Complaint   Patient presents with     Prostate Cancer     Review latest PSA       Antonette Nagel, EMT

## 2020-09-24 DIAGNOSIS — C61 PROSTATE CANCER (H): ICD-10-CM

## 2020-09-24 LAB — PSA SERPL-MCNC: 0.18 UG/L (ref 0–4)

## 2020-09-24 PROCEDURE — 36415 COLL VENOUS BLD VENIPUNCTURE: CPT | Performed by: UROLOGY

## 2020-09-24 PROCEDURE — 84153 ASSAY OF PSA TOTAL: CPT | Performed by: UROLOGY

## 2020-09-29 DIAGNOSIS — C61 PROSTATE CANCER (H): Primary | ICD-10-CM

## 2020-10-02 ENCOUNTER — VIRTUAL VISIT (OUTPATIENT)
Dept: UROLOGY | Facility: CLINIC | Age: 75
End: 2020-10-02
Payer: COMMERCIAL

## 2020-10-02 VITALS — BODY MASS INDEX: 26.07 KG/M2 | HEIGHT: 68 IN | WEIGHT: 172 LBS

## 2020-10-02 DIAGNOSIS — C61 PROSTATE CANCER (H): Primary | ICD-10-CM

## 2020-10-02 PROCEDURE — 99213 OFFICE O/P EST LOW 20 MIN: CPT | Mod: 95 | Performed by: UROLOGY

## 2020-10-02 ASSESSMENT — PAIN SCALES - GENERAL: PAINLEVEL: NO PAIN (0)

## 2020-10-02 ASSESSMENT — MIFFLIN-ST. JEOR: SCORE: 1494.69

## 2020-10-02 NOTE — PROGRESS NOTES
"Krista Antonio is a 75 year old male who is being evaluated via a billable video visit.       The patient has been notified of following:      \"This video visit will be conducted via a call between you and your physician/provider. We have found that certain health care needs can be provided without the need for an in-person physical exam.  This service lets us provide the care you need with a video conversation.  If a prescription is necessary we can send it directly to your pharmacy.  If lab work is needed we can place an order for that and you can then stop by our lab to have the test done at a later time.     Video visits are billed at different rates depending on your insurance coverage.  Please reach out to your insurance provider with any questions.     If during the course of the call the physician/provider feels a video visit is not appropriate, you will not be charged for this service.\"     Patient has given verbal consent for Video visit? Yes  How would you like to obtain your AVS? MyChart  If you are dropped from the video visit, the video invite should be resent to: Text to cell phone: [phone]    Will anyone else be joining your video visit? No      CHIEF COMPLAINT   It was my pleasure to see Krista Antonio who is a 75 year old male for follow-up of Prostate Cancer.      HPI  Krista Antonio is a very pleasant 75 year old male who presents with a history of prostate cancer. He is s/p RALP completed 12/19/2019. Good improvement over last couple weeks of incontinence. Out of Depends. Very active with pushups, Judo, etc. Still with some occasional leakage, but overall has noted marked improvements over past 3 months. Anticipating further improvement week by week. PSA now up to 0.18.     PSA  9/24/2020 - 0.18  6/22/2020 - 0.05  3/16/2020 - 0.01     RALP 12/19/2019   Tumor        Histologic Type:   Adenocarcinoma, mixed: small acinar and ductal   (Ductal: Between 15 and 20% of tumor " "  area).             Primary Pattern:   4 (60%)             Secondary Pattern:   3 (40%).             Total Jimbo Score:   7 (Grade group 3).     Extent        Tumor Quantitation             Proportion (p%) of prostatic tissue involved by tumor:   Tumor   involving right and left lobes and   approximately 8% of the prostate area.        Extraprostatic Extension:   Present: Focal: Left posterior inferior.        Seminal Vesicle Invasion:   Not identified     Margins:   Margins uninvolved by invasive carcinoma     Accessory Findings        Treatment Effect on Carcinoma:   Not identified        Lymph-Vascular Invasion:   Not identified        Perineural Invasion:   Present     Pathologic Staging (pTNM)        Primary Tumor (pT):    pT3a:  Extraprostatic extension.        Regional Lymph Nodes (pN):   pN0             Number of Lymph Nodes Examined:   11             Number of Lymph Nodes Involved:      0        Distant Metastasis (pM):    pM N/A.          Allergies:   Atorvastatin, Simvastatin, and Sulfa drugs         Review of Systems:  From intake questionnaire     Skin: negative  Eyes: negative  Ears/Nose/Throat: negative  Respiratory: No shortness of breath, dyspnea on exertion, cough, or hemoptysis  Cardiovascular: No chest pain or palpitations  Gastrointestinal: negative; no nausea/vomiting, constipation or diarrhea  Genitourinary: as per HPI  Musculoskeletal: negative  Neurologic: negative  Psychiatric: negative  Hematologic/Lymphatic/Immunologic: negative  Endocrine: negative         Physical Exam:     Patient is a 75 year old  male   Vitals: Height 1.727 m (5' 8\"), weight 78 kg (172 lb).  Constitutional: Body mass index is 26.15 kg/m .  Alert, no acute distress, oriented, conversant  Eyes: no scleral icterus; extraocular muscles intact  Respiratory: no respiratory distress, or pursed lip breathing  Musculoskeletal: normal range of motion  Skin: no notable lesions visible  Neuro: Alert, oriented, speech and " mentation normal  Psych: affect and mood normal, alert and oriented to person, place and time         Assessment and Plan:     75 year old male with pT3a prostate cancer, Jimbo 3+4, s/p RALP 12/19/2019. Negative margins. Doing well. Continence improving but still not to perfect. Noting ongoing improvement week by week. Discussed PSA result, now up to 0.18, suggestive or biochemical recurrence. He did have ROXY on pathology.  We discussed the role of salvage radiotherapy in this circumstance. We also discussed, however, that we typically wait until he has achieved optimal continence before radiation. In this case, he would like to wait another 3 months to work on continence, prior to radiation. We discussed the small risk of disease progression during this interval, but given his PSA still remains low, he would like to observe and will plan to re-assess in 3 months with another PSA. Will likely plan radiation referral at that time.     Follow-up in 3 months with PSA    Orders  Orders Placed This Encounter   Procedures     PSA tumor marker [[id number]]       Video-Visit Details    Type of service:  Video Visit    Video Start Time: 9:35 AM  Video End Time: 9:49 AM    Originating Location (pt. Location): Home    Distant Location (provider location):  [facility] UROLOGY [facility] [place]     Platform used for Video Visit: Ruslan Kinsey MD  Urology  [facility]

## 2020-12-22 ENCOUNTER — PRE VISIT (OUTPATIENT)
Dept: UROLOGY | Facility: CLINIC | Age: 75
End: 2020-12-22

## 2020-12-22 NOTE — TELEPHONE ENCOUNTER
Visit Type : PSA check     Records/Orders: PSA    Pt Contacted: yes called and left a message to please get PSA done before appointment     At Rooming:phone

## 2020-12-29 DIAGNOSIS — C61 PROSTATE CANCER (H): ICD-10-CM

## 2020-12-29 LAB — PSA SERPL-MCNC: 0.61 UG/L (ref 0–4)

## 2020-12-29 PROCEDURE — 36415 COLL VENOUS BLD VENIPUNCTURE: CPT | Performed by: UROLOGY

## 2020-12-29 PROCEDURE — 84153 ASSAY OF PSA TOTAL: CPT | Performed by: UROLOGY

## 2021-01-07 ENCOUNTER — VIRTUAL VISIT (OUTPATIENT)
Dept: UROLOGY | Facility: CLINIC | Age: 76
End: 2021-01-07
Payer: COMMERCIAL

## 2021-01-07 DIAGNOSIS — C61 PROSTATE CANCER (H): Primary | ICD-10-CM

## 2021-01-07 PROCEDURE — 99213 OFFICE O/P EST LOW 20 MIN: CPT | Mod: 95 | Performed by: UROLOGY

## 2021-01-07 ASSESSMENT — PAIN SCALES - GENERAL: PAINLEVEL: NO PAIN (0)

## 2021-01-07 NOTE — PROGRESS NOTES
"Krista Antonio is a 75 year old male who is being evaluated via a billable telephone visit.      What phone number would you like to be contacted at? [phone]  How would you like to obtain your AVS? MyChart  {PROVIDER CHARTING PREFERENCE:031205}    Subjective     Krista Antonio is a 75 year old who presents to clinic today for the following health issues {ACCOMPANIED BY STATEMENT (Optional):046193}    HPI       ***    Review of Systems   {ROS COMP (Optional):765738}      Objective           Vitals:  No vitals were obtained today due to virtual visit.    Physical Exam   {GENERAL APPEARANCE:50::\"healthy\",\"alert\",\"no distress\"}  PSYCH: Alert and oriented times 3; coherent speech, normal   rate and volume, able to articulate logical thoughts, able   to abstract reason, no tangential thoughts, no hallucinations   or delusions  His affect is { :5852814::\"normal\"}  RESP: No cough, no audible wheezing, able to talk in full sentences  Remainder of exam unable to be completed due to telephone visits    {Diagnostic Test Results (Optional):319422}    {AMBULATORY ATTESTATION (Optional):288911}        Phone call duration: *** minutes  "

## 2021-01-07 NOTE — PATIENT INSTRUCTIONS
Please follow up in 3 month with a PSA before     It was a pleasure meeting with you today.  Thank you for allowing me and my team the privilege of caring for you today.  YOU are the reason we are here, and I truly hope we provided you with the excellent service you deserve.  Please let us know if there is anything else we can do for you so that we can be sure you are leaving completely satisfied with your care experience.

## 2021-01-07 NOTE — PROGRESS NOTES
"Krista Antonio is a 75 year old male who is being evaluated via a billable telephone visit.      The patient has been notified of following:     \"This telephone visit will be conducted via a call between you and your physician/provider. We have found that certain health care needs can be provided without the need for a physical exam.  This service lets us provide the care you need with a short phone conversation.  If a prescription is necessary we can send it directly to your pharmacy.  If lab work is needed we can place an order for that and you can then stop by our lab to have the test done at a later time.    Telephone visits are billed at different rates depending on your insurance coverage. During this emergency period, for some insurers they may be billed the same as an in-person visit.  Please reach out to your insurance provider with any questions.    If during the course of the call the physician/provider feels a telephone visit is not appropriate, you will not be charged for this service.\"    Patient has given verbal consent for Telephone visit?  Yes    What phone number would you like to be contacted at? [phone]    How would you like to obtain your AVS? [employer]    Chief Complaint   It was my pleasure to speak with Krista Antonio who is a 75 year old male for follow-up of Prostate Cancer.      HPI  Krista Antonio is a very pleasant 75 year old male who presents with a history of prostate cancer. He is s/p RALP completed 12/19/2019. Continues to note improvement with continence. Very active with pushups, Judo, etc. Still with some occasional leakage, but overall has noted marked improvements over past 3 months. PSA now up to 0.61. He is ready to consider radiation.      PSA  12/29/2020 - 0.61  9/24/2020 - 0.18  6/22/2020 - 0.05  3/16/2020 - 0.01    RALP 12/19/2019   Tumor        Histologic Type:   Adenocarcinoma, mixed: small acinar and ductal   (Ductal: Between 15 and 20% of tumor "   area).             Primary Pattern:   4 (60%)             Secondary Pattern:   3 (40%).             Total Jimbo Score:   7 (Grade group 3).     Extent        Tumor Quantitation             Proportion (p%) of prostatic tissue involved by tumor:   Tumor   involving right and left lobes and   approximately 8% of the prostate area.        Extraprostatic Extension:   Present: Focal: Left posterior inferior.        Seminal Vesicle Invasion:   Not identified     Margins:   Margins uninvolved by invasive carcinoma     Pathologic Staging (pTNM)        Primary Tumor (pT):    pT3a:  Extraprostatic extension.        Regional Lymph Nodes (pN):   pN0             Number of Lymph Nodes Examined:   11             Number of Lymph Nodes Involved:      0        Distant Metastasis (pM):    pM N/A.     I have reviewed and updated the patient's Past Medical History, Social History, Family History and Medication List.    ALLERGIES  Atorvastatin, Simvastatin, and Sulfa drugs    ASSESSMENT and PLAN  75 year old male with pT3a prostate cancer, [place] 3+4, s/p RALP 12/19/2019. Negative margins. PSA has now risen to 0.61. Continence has improved to the point where he is now willing to consider radiation. We discussed salvage radiotherapy today, and will plan rad onc referral to discuss this further. He will otherwise follow-up with me in 3 months with PSA.    Rad onc referral   Follow-up in 3 months with PSA    Phone call duration: 12 minutes    Spencer Kinsey MD  Urology  [facility]

## 2021-01-14 ENCOUNTER — HEALTH MAINTENANCE LETTER (OUTPATIENT)
Age: 76
End: 2021-01-14

## 2021-02-03 ENCOUNTER — TRANSFERRED RECORDS (OUTPATIENT)
Dept: HEALTH INFORMATION MANAGEMENT | Facility: CLINIC | Age: 76
End: 2021-02-03

## 2021-02-22 DIAGNOSIS — C61 PROSTATE CANCER (H): Primary | ICD-10-CM

## 2021-02-25 ENCOUNTER — ALLIED HEALTH/NURSE VISIT (OUTPATIENT)
Dept: UROLOGY | Facility: CLINIC | Age: 76
End: 2021-02-25
Payer: COMMERCIAL

## 2021-02-25 DIAGNOSIS — C61 PROSTATE CANCER (H): Primary | ICD-10-CM

## 2021-02-25 PROCEDURE — 96402 CHEMO HORMON ANTINEOPL SQ/IM: CPT | Performed by: UROLOGY

## 2021-02-25 NOTE — PROGRESS NOTES
Krista Ledy Antonio comes into clinic today at the request of Dr Kinsey Ordering Provider for Lupron injection .  This service provided today was under the supervising provider of the day Dr Puente , who was available if needed.       Ledy is here  For a 3 month Lupron injection per Dr Kinsey.He is one week into his RARX . The following medication was given: He will follow up with DR Kinsey in 3 months with a PSA ..    MEDICATION: Lupron Depot 22.5 mg  ROUTE: IM  SITE: LUQ - Gluteus  DOSE: 22.5mg  LOT #: [id number]  :  [employer]  EXPIRATION DATE:  11/25/22  NDC#: [id number]   [email or web address]

## 2021-04-30 ENCOUNTER — HOSPITAL ENCOUNTER (OUTPATIENT)
Dept: LAB | Facility: CLINIC | Age: 76
Discharge: HOME | End: 2021-04-30
Attending: UROLOGY | Admitting: UROLOGY
Payer: COMMERCIAL

## 2021-04-30 DIAGNOSIS — C61 PROSTATE CANCER (H): ICD-10-CM

## 2021-04-30 LAB — PSA SERPL-MCNC: <0.01 UG/L (ref 0–4)

## 2021-04-30 PROCEDURE — 84153 ASSAY OF PSA TOTAL: CPT | Performed by: UROLOGY

## 2021-04-30 PROCEDURE — 36415 COLL VENOUS BLD VENIPUNCTURE: CPT | Performed by: UROLOGY

## 2021-05-07 ENCOUNTER — VIRTUAL VISIT (OUTPATIENT)
Dept: UROLOGY | Facility: CLINIC | Age: 76
End: 2021-05-07
Payer: COMMERCIAL

## 2021-05-07 VITALS — BODY MASS INDEX: 25.92 KG/M2 | WEIGHT: 175 LBS | HEIGHT: 69 IN

## 2021-05-07 DIAGNOSIS — C61 PROSTATE CANCER (H): Primary | ICD-10-CM

## 2021-05-07 PROCEDURE — 99213 OFFICE O/P EST LOW 20 MIN: CPT | Mod: 95 | Performed by: UROLOGY

## 2021-05-07 ASSESSMENT — MIFFLIN-ST. JEOR: SCORE: 1519.17

## 2021-05-07 ASSESSMENT — PAIN SCALES - GENERAL: PAINLEVEL: NO PAIN (0)

## 2021-05-07 NOTE — PROGRESS NOTES
Krista Antonio is a 76 year old male who is being evaluated via a billable video visit.      How would you like to obtain your AVS? MyChart  If the video visit is dropped, the invitation should be resent by: Text to cell phone: [phone]  Will anyone else be joining your video visit? No    Video Start Time: 9:22 AM    CHIEF COMPLAINT   It was my pleasure to see Krista Antonio who is a 76 year old male for follow-up of Prostate Cancer.    HPI  Krista Antonio is a very pleasant 76 year old male who presents with a history of prostate cancer. He is s/p RALP completed 12/19/2019. Noted ongoing improvement with continence over the first year after surgery. Very active with pushups, Judo, etc. PSA calli to 0.61. Salvage radiotherapy completed 4/2021. Did receive 3 month Lupron 2/25/2021. Tolerated radiation well, but did note some fatigue and a little worsening of his continence.    PSA  4/30/2021 - <0.01  12/29/2020 - 0.61  9/24/2020 - 0.18  6/22/2020 - 0.05  3/16/2020 - 0.01     RALP 12/19/2019   Tumor        Histologic Type:   Adenocarcinoma, mixed: small acinar and ductal   (Ductal: Between 15 and 20% of tumor   area).             Primary Pattern:   4 (60%)             Secondary Pattern:   3 (40%).             Total Jimbo Score:   7 (Grade group 3).     Extent        Tumor Quantitation             Proportion (p%) of prostatic tissue involved by tumor:   Tumor   involving right and left lobes and   approximately 8% of the prostate area.        Extraprostatic Extension:   Present: Focal: Left posterior inferior.        Seminal Vesicle Invasion:   Not identified     Margins:   Margins uninvolved by invasive carcinoma      Pathologic Staging (pTNM)        Primary Tumor (pT):    pT3a:  Extraprostatic extension.        Regional Lymph Nodes (pN):   pN0             Number of Lymph Nodes Examined:   11             Number of Lymph Nodes Involved:      0        Distant Metastasis (pM):    pM N/A.          Allergies:    Atorvastatin, Simvastatin, and Sulfa drugs         Review of Systems:  From intake questionnaire     Skin: negative  Eyes: negative  Ears/Nose/Throat: negative  Respiratory: No shortness of breath, dyspnea on exertion, cough, or hemoptysis  Cardiovascular: No chest pain or palpitations  Gastrointestinal: negative; no nausea/vomiting, constipation or diarrhea  Genitourinary: as per HPI  Musculoskeletal: negative  Neurologic: negative  Psychiatric: negative  Hematologic/Lymphatic/Immunologic: negative  Endocrine: negative         Physical Exam:     Vitals:  No vitals were obtained today due to virtual visit.    Physical Exam   GENERAL: Healthy, alert and no distress  EYES: Eyes grossly normal to inspection.  No discharge or erythema, or obvious scleral/conjunctival abnormalities.  RESP: No audible wheeze, cough, or visible cyanosis.  No visible retractions or increased work of breathing.    SKIN: Visible skin clear. No significant rash, abnormal pigmentation or lesions.  NEURO: Cranial nerves grossly intact.  Mentation and speech appropriate for age.  PSYCH: Mentation appears normal, affect normal/bright, judgement and insight intact, normal speech and appearance well-groomed.      Outside and Past Medical records:    Review of prior external note(s) from - Outside records from [place] [facility]  Review of the result(s) of each unique test - PSA, pathology         Assessment and Plan:     76 year old male with pT3a prostate cancer, [place] 3+4, s/p RALP 12/19/2019. Negative margins. PSA had risen to 0.61. He has now completed salvage radiotherapy as of 4/2021 and tolerated this well. Did so Lupron with the radiation. He does note fatigue that is still ongoing. Did have slight worsening of continence toward the end of radiotherapy, but feels this is improving. Will not plan additional ADT at this time.     Follow-up in 3 months with PSA    Orders  Orders Placed This Encounter   Procedures     PSA tumor marker  [[id number]]       Video-Visit Details    Type of service:  Video Visit    Video End Time:9:38 AM    Originating Location (pt. Location): Home    Distant Location (provider location):  [facility]     Platform used for Video Visit: Miew    20 minutes spent on the date of the encounter including direct interaction with the patient, performing chart review, history and exam, documentation and further activities as noted above.    Spencer Kinsey MD  Urology  [facility]

## 2021-05-09 ENCOUNTER — HEALTH MAINTENANCE LETTER (OUTPATIENT)
Age: 76
End: 2021-05-09

## 2021-07-30 ENCOUNTER — LAB (OUTPATIENT)
Dept: LAB | Facility: CLINIC | Age: 76
End: 2021-07-30
Payer: COMMERCIAL

## 2021-07-30 DIAGNOSIS — C61 PROSTATE CANCER (H): ICD-10-CM

## 2021-07-30 LAB — PSA SERPL-MCNC: <0.01 UG/L (ref 0–4)

## 2021-07-30 PROCEDURE — 36415 COLL VENOUS BLD VENIPUNCTURE: CPT

## 2021-07-30 PROCEDURE — 84153 ASSAY OF PSA TOTAL: CPT

## 2021-08-13 ENCOUNTER — VIRTUAL VISIT (OUTPATIENT)
Dept: UROLOGY | Facility: CLINIC | Age: 76
End: 2021-08-13
Payer: COMMERCIAL

## 2021-08-13 VITALS — HEIGHT: 68 IN | WEIGHT: 180 LBS | BODY MASS INDEX: 27.28 KG/M2

## 2021-08-13 DIAGNOSIS — C61 PROSTATE CANCER (H): Primary | ICD-10-CM

## 2021-08-13 PROCEDURE — 99213 OFFICE O/P EST LOW 20 MIN: CPT | Mod: 95 | Performed by: UROLOGY

## 2021-08-13 ASSESSMENT — PAIN SCALES - GENERAL: PAINLEVEL: NO PAIN (0)

## 2021-08-13 ASSESSMENT — MIFFLIN-ST. JEOR: SCORE: 1525.97

## 2021-08-13 NOTE — PROGRESS NOTES
Krista Antonio is a 76 year old male who is being evaluated via a billable video visit.      How would you like to obtain your AVS? MyChart  If the video visit is dropped, the invitation should be resent by: Text to cell phone: [phone]  Will anyone else be joining your video visit? No    Video Start Time: 10:18 AM    CHIEF COMPLAINT   It was my pleasure to see Krista Antonio who is a 76 year old male for follow-up of Prostate Cancer.      HPI  Krista Antonio is a very pleasant 76 year old male who presents with a history of prostate cancer. He is s/p RALP completed 12/19/2019. Noted ongoing improvement with continence over the first year after surgery. Very active with pushups, Judo, etc. PSA calli to 0.61. Salvage radiotherapy completed 4/2021. Did receive 3 month Lupron 2/25/2021. Tolerated radiation well, but did note some fatigue and a little worsening of his continence, but this is gradually improving.     PSA  7/30/2021 - <0.01  4/30/2021 - <0.01  12/29/2020 - 0.61  9/24/2020 - 0.18  6/22/2020 - 0.05  3/16/2020 - 0.01    RALP 12/19/2019   Tumor        Histologic Type:   Adenocarcinoma, mixed: small acinar and ductal   (Ductal: Between 15 and 20% of tumor   area).             Primary Pattern:   4 (60%)             Secondary Pattern:   3 (40%).             Total Cobalt Score:   7 (Grade group 3).        Extraprostatic Extension:   Present: Focal: Left posterior inferior.        Seminal Vesicle Invasion:   Not identified     Margins:   Margins uninvolved by invasive carcinoma      Pathologic Staging (pTNM)        Primary Tumor (pT):    pT3a:  Extraprostatic extension.        Regional Lymph Nodes (pN):   pN0             Number of Lymph Nodes Examined:   11             Number of Lymph Nodes Involved:      0        Distant Metastasis (pM):    pM N/A.          Allergies:   Atorvastatin, Simvastatin, and Sulfa drugs         Review of Systems:  From intake questionnaire     Skin: negative  Eyes:  negative  Ears/Nose/Throat: negative  Respiratory: No shortness of breath, dyspnea on exertion, cough, or hemoptysis  Cardiovascular: No chest pain or palpitations  Gastrointestinal: negative; no nausea/vomiting, constipation or diarrhea  Genitourinary: as per HPI  Musculoskeletal: negative  Neurologic: negative  Psychiatric: negative  Hematologic/Lymphatic/Immunologic: negative  Endocrine: negative         Physical Exam:     Vitals:  No vitals were obtained today due to virtual visit.    Physical Exam   GENERAL: Healthy, alert and no distress  EYES: Eyes grossly normal to inspection.  No discharge or erythema, or obvious scleral/conjunctival abnormalities.  RESP: No audible wheeze, cough, or visible cyanosis.  No visible retractions or increased work of breathing.    SKIN: Visible skin clear. No significant rash, abnormal pigmentation or lesions.  NEURO: Cranial nerves grossly intact.  Mentation and speech appropriate for age.  PSYCH: Mentation appears normal, affect normal/bright, judgement and insight intact, normal speech and appearance well-groomed.      Outside and Past Medical records:    Review of the result(s) of each unique test - PSA         Assessment and Plan:     76 year old male with pT3a prostate cancer, Jimbo 3+4, s/p RALP 12/19/2019. Negative margins. PSA had risen to 0.61. He has now completed salvage radiotherapy as of 4/2021 and tolerated this well. Did Lupron with the radiation. He does note fatigue that is still ongoing. Did have slight worsening of continence toward the end of radiotherapy, but feels this is improving. Will not plan additional ADT at this time.     - Follow-up in 6 months with PSA    Orders  Orders Placed This Encounter   Procedures     PSA tumor marker [[id number]]     Video-Visit Details    Type of service:  Video Visit    Video End Time:10:29 AM    Originating Location (pt. Location): Home    Distant Location (provider location):  [facility] UROLOGY AND [facility] FOR PROSTATE AND  UROLOGIC CANCERS    Platform used for Video Visit: [employer]    21 minutes spent on the date of the encounter including direct interaction with the patient, performing chart review, history and exam, documentation and further activities as noted above.    Spencer Kinsey MD  Urology  [facility]

## 2021-08-29 ENCOUNTER — HEALTH MAINTENANCE LETTER (OUTPATIENT)
Age: 76
End: 2021-08-29

## 2021-10-24 ENCOUNTER — HEALTH MAINTENANCE LETTER (OUTPATIENT)
Age: 76
End: 2021-10-24

## 2021-12-19 ENCOUNTER — HEALTH MAINTENANCE LETTER (OUTPATIENT)
Age: 76
End: 2021-12-19

## 2022-02-07 ENCOUNTER — LAB (OUTPATIENT)
Dept: LAB | Facility: CLINIC | Age: 77
End: 2022-02-07
Payer: COMMERCIAL

## 2022-02-07 DIAGNOSIS — C61 PROSTATE CANCER (H): ICD-10-CM

## 2022-02-07 LAB — PSA SERPL-MCNC: <0.01 UG/L (ref 0–4)

## 2022-02-07 PROCEDURE — 36415 COLL VENOUS BLD VENIPUNCTURE: CPT

## 2022-02-07 PROCEDURE — 84153 ASSAY OF PSA TOTAL: CPT

## 2022-02-10 ENCOUNTER — VIRTUAL VISIT (OUTPATIENT)
Dept: UROLOGY | Facility: CLINIC | Age: 77
End: 2022-02-10
Payer: COMMERCIAL

## 2022-02-10 VITALS — HEIGHT: 69 IN | BODY MASS INDEX: 26.07 KG/M2 | WEIGHT: 176 LBS

## 2022-02-10 DIAGNOSIS — C61 PROSTATE CANCER (H): Primary | ICD-10-CM

## 2022-02-10 PROCEDURE — 99213 OFFICE O/P EST LOW 20 MIN: CPT | Mod: 95 | Performed by: UROLOGY

## 2022-02-10 ASSESSMENT — PAIN SCALES - GENERAL: PAINLEVEL: NO PAIN (0)

## 2022-02-10 ASSESSMENT — MIFFLIN-ST. JEOR: SCORE: 1523.71

## 2022-02-10 NOTE — PROGRESS NOTES
Krista Antonio is a 76 year old male who is being evaluated via a billable video visit.      How would you like to obtain your AVS? MyChart  If the video visit is dropped, the invitation should be resent by: Text to cell phone: [phone]  Will anyone else be joining your video visit? No    Video Start Time: 11:40 AM    CHIEF COMPLAINT   It was my pleasure to see Krista Antonio who is a 76 year old male for follow-up of Prostate Cancer.      HPI  Krista Antonio is a very pleasant 76 year old male who presents with a history of prostate cancer. He is s/p RALP completed 12/19/2019. Noted ongoing improvement with continence over the first year after surgery. Very active with pushups, Judo, etc. PSA calli to 0.61. Salvage radiotherapy completed 4/2021. Tolerated radiation well, but did note some fatigue and a little worsening of his continence, but this is gradually improving.     PSA  2/7/2022 - <0.01  7/30/2021 - <0.01  4/30/2021 - <0.01  12/29/2020 - 0.61  9/24/2020 - 0.18  6/22/2020 - 0.05  3/16/2020 - 0.01    RALP 12/19/2019   Tumor        Histologic Type:   Adenocarcinoma, mixed: small acinar and ductal   (Ductal: Between 15 and 20% of tumor   area).             Primary Pattern:   4 (60%)             Secondary Pattern:   3 (40%).             Total Jimbo Score:   7 (Grade group 3).        Extraprostatic Extension:   Present: Focal: Left posterior inferior.        Seminal Vesicle Invasion:   Not identified     Margins:   Margins uninvolved by invasive carcinoma      Pathologic Staging (pTNM)        Primary Tumor (pT):    pT3a:  Extraprostatic extension.        Regional Lymph Nodes (pN):   pN0             Number of Lymph Nodes Examined:   11             Number of Lymph Nodes Involved:      0        Distant Metastasis (pM):    pM N/A.          Allergies:   Atorvastatin, Simvastatin, and Sulfa drugs         Review of Systems:  From intake questionnaire     Skin: negative  Eyes: negative  Ears/Nose/Throat:  negative  Respiratory: No shortness of breath, dyspnea on exertion, cough, or hemoptysis  Cardiovascular: No chest pain or palpitations  Gastrointestinal: negative; no nausea/vomiting, constipation or diarrhea  Genitourinary: as per HPI  Musculoskeletal: negative  Neurologic: negative  Psychiatric: negative  Hematologic/Lymphatic/Immunologic: negative  Endocrine: negative         Physical Exam:     Vitals:  No vitals were obtained today due to virtual visit.    Physical Exam   GENERAL: Healthy, alert and no distress  EYES: Eyes grossly normal to inspection.  No discharge or erythema, or obvious scleral/conjunctival abnormalities.  RESP: No audible wheeze, cough, or visible cyanosis.  No visible retractions or increased work of breathing.    SKIN: Visible skin clear. No significant rash, abnormal pigmentation or lesions.  NEURO: Cranial nerves grossly intact.  Mentation and speech appropriate for age.  PSYCH: Mentation appears normal, affect normal/bright, judgement and insight intact, normal speech and appearance well-groomed.      Outside and Past Medical records:                                                                      Review of the result(s) of each unique test - PSA         Assessment and Plan:     76 year old male with pT3a prostate cancer, [place] 3+4, s/p RALP 12/19/2019. Negative margins. PSA had risen to 0.61. He has now completed salvage radiotherapy as of 4/2021 and tolerated this well. Did Lupron with the radiation. He does note fatigue that is improving. Did have slight worsening of continence toward the end of radiotherapy, but feels this is improving. Will not plan additional ADT at this time.     - Follow-up in 6 months with PSA     Orders  Orders Placed This Encounter   Procedures     PSA tumor marker [[id number]]     Video-Visit Details    Type of service:  Video Visit    Video End Time:11:47 AM    Originating Location (pt. Location): Home    Distant Location (provider location):  [facility]     Platform used for Video Visit: D[employer]imity    15 minutes spent on the date of the encounter including direct interaction with the patient, performing chart review, history and exam, documentation and further activities as noted above.    Spencer Kinsey MD  Urology  [facility]

## 2022-02-13 ENCOUNTER — HEALTH MAINTENANCE LETTER (OUTPATIENT)
Age: 77
End: 2022-02-13

## 2022-04-10 ENCOUNTER — HEALTH MAINTENANCE LETTER (OUTPATIENT)
Age: 77
End: 2022-04-10

## 2022-07-31 ENCOUNTER — HEALTH MAINTENANCE LETTER (OUTPATIENT)
Age: 77
End: 2022-07-31

## 2022-08-05 ENCOUNTER — LAB (OUTPATIENT)
Dept: LAB | Facility: CLINIC | Age: 77
End: 2022-08-05
Payer: COMMERCIAL

## 2022-08-05 DIAGNOSIS — C61 PROSTATE CANCER (H): ICD-10-CM

## 2022-08-05 PROCEDURE — 36415 COLL VENOUS BLD VENIPUNCTURE: CPT

## 2022-08-05 PROCEDURE — 84153 ASSAY OF PSA TOTAL: CPT

## 2022-08-11 ENCOUNTER — VIRTUAL VISIT (OUTPATIENT)
Dept: UROLOGY | Facility: CLINIC | Age: 77
End: 2022-08-11
Payer: COMMERCIAL

## 2022-08-11 VITALS — WEIGHT: 174 LBS | BODY MASS INDEX: 25.77 KG/M2 | HEIGHT: 69 IN

## 2022-08-11 DIAGNOSIS — C61 PROSTATE CANCER (H): Primary | ICD-10-CM

## 2022-08-11 PROCEDURE — 99213 OFFICE O/P EST LOW 20 MIN: CPT | Mod: 95 | Performed by: UROLOGY

## 2022-08-11 RX ORDER — TADALAFIL 5 MG/1
TABLET ORAL
COMMUNITY
Start: 2022-02-16

## 2022-08-11 RX ORDER — ERYTHROMYCIN 5 MG/G
OINTMENT OPHTHALMIC
COMMUNITY
Start: 2022-02-28 | End: 2024-09-05

## 2022-08-11 ASSESSMENT — PAIN SCALES - GENERAL: PAINLEVEL: NO PAIN (0)

## 2022-08-11 NOTE — PROGRESS NOTES
Krista Antonio is a 77 year old male who is being evaluated via a billable video visit.      How would you like to obtain your AVS? MyChart  If the video visit is dropped, the invitation should be resent by: Text to cell phone: [phone]  Will anyone else be joining your video visit? No    Video Start Time: 9:42 AM    CHIEF COMPLAINT   It was my pleasure to see Krista Antonio who is a 77 year old male for follow-up of Prostate Cancer.      HPI  Krista Antonio is a very pleasant 77 year old male who presents with a history of prostate cancer. He is s/p RALP completed 12/19/2019.  PSA calli to 0.61. Salvage radiotherapy completed 4/2021. Tolerated radiation well, but did note some fatigue and a little worsening of his continence.  Noted ongoing improvement with continence over the first year after surgery but still with some leakage. Very active with pushups, Judo, etc. And has some ongoing bothersome incontinence with activity. Is interested in discussing possible intervention for this.     PSA  8/5/2022 - 0.01  2/7/2022 - <0.01  7/30/2021 - <0.01  4/30/2021 - <0.01  12/29/2020 - 0.61  9/24/2020 - 0.18  6/22/2020 - 0.05  3/16/2020 - 0.01     RALP 12/19/2019   Tumor        Histologic Type:   Adenocarcinoma, mixed: small acinar and ductal   (Ductal: Between 15 and 20% of tumor   area).             Primary Pattern:   4 (60%)             Secondary Pattern:   3 (40%).             Total [place] Score:   7 (Grade group 3).        Extraprostatic Extension:   Present: Focal: Left posterior inferior.        Seminal Vesicle Invasion:   Not identified     Margins:   Margins uninvolved by invasive carcinoma      Pathologic Staging (pTNM)        Primary Tumor (pT):    pT3a:  Extraprostatic extension.        Regional Lymph Nodes (pN):   pN0             Number of Lymph Nodes Examined:   11             Number of Lymph Nodes Involved:      0        Distant Metastasis (pM):    pM N/A.          Allergies:   Atorvastatin,  Simvastatin, and Sulfa drugs         Review of Systems:  From intake questionnaire     Skin: negative  Eyes: negative  Ears/Nose/Throat: negative  Respiratory: No shortness of breath, dyspnea on exertion, cough, or hemoptysis  Cardiovascular: No chest pain or palpitations  Gastrointestinal: negative; no nausea/vomiting, constipation or diarrhea  Genitourinary: as per HPI  Musculoskeletal: negative  Neurologic: negative  Psychiatric: negative  Hematologic/Lymphatic/Immunologic: negative  Endocrine: negative         Physical Exam:     Vitals:  No vitals were obtained today due to virtual visit.    Physical Exam   GENERAL: Healthy, alert and no distress  EYES: Eyes grossly normal to inspection.  No discharge or erythema, or obvious scleral/conjunctival abnormalities.  RESP: No audible wheeze, cough, or visible cyanosis.  No visible retractions or increased work of breathing.    SKIN: Visible skin clear. No significant rash, abnormal pigmentation or lesions.  NEURO: Cranial nerves grossly intact.  Mentation and speech appropriate for age.  PSYCH: Mentation appears normal, affect normal/bright, judgement and insight intact, normal speech and appearance well-groomed.      Outside and Past Medical records:    Review of the result(s) of each unique test - PSA         Assessment and Plan:     77 year old male with pT3a prostate cancer, [place] 3+4, s/p RALP 12/19/2019. Negative margins. PSA had risen to 0.61. He has now completed salvage radiotherapy as of 4/2021 and tolerated this well. Did Lupron with the radiation. Will not plan additional ADT at this time. His PSA was 0.01, which while no longer undetectable, remains very low. Will continue monitoring and plan PSA 6 months.    Did have slight worsening of continence toward the end of radiotherapy. He is still bothered by stress incontinence with activity and we discussed option of consultation with reconstructive urology to review sling vs AUS. He would like to look into  this further.     - Schedule consultation with reconstructive urology  - Follow-up with me in 6 months with PSA    Orders  Orders Placed This Encounter   Procedures     PSA tumor marker [[id number]]     Video-Visit Details    Type of service:  Video Visit    Video End Time:9:54 AM    Originating Location (pt. Location): Home    Distant Location (provider location):  [facility] UROLOGY AND [facility] FOR PROSTATE AND UROLOGIC CANCERS    Platform used for Video Visit: e[employer]ec    15 minutes spent on the date of the encounter including direct interaction with the patient, performing chart review, history and exam, documentation and further activities as noted above.    Spencer Kinsey MD  Urology  [facility]

## 2022-09-22 ENCOUNTER — PRE VISIT (OUTPATIENT)
Dept: UROLOGY | Facility: CLINIC | Age: 77
End: 2022-09-22

## 2022-09-22 NOTE — TELEPHONE ENCOUNTER
Reason for visit: AUS vs sling consult     Relevant information: incontinence     Records/imaging/labs/orders: all records available    Pt called: real trends message sent      Socorro Gtz CMA  9/22/2022  4:46 PM

## 2022-09-26 ENCOUNTER — TELEPHONE (OUTPATIENT)
Dept: UROLOGY | Facility: CLINIC | Age: 77
End: 2022-09-26

## 2022-09-26 ENCOUNTER — VIRTUAL VISIT (OUTPATIENT)
Dept: UROLOGY | Facility: CLINIC | Age: 77
End: 2022-09-26
Payer: COMMERCIAL

## 2022-09-26 DIAGNOSIS — C61 MALIGNANT NEOPLASM OF PROSTATE (H): ICD-10-CM

## 2022-09-26 DIAGNOSIS — N39.3 MALE URINARY STRESS INCONTINENCE: Primary | ICD-10-CM

## 2022-09-26 PROCEDURE — 99214 OFFICE O/P EST MOD 30 MIN: CPT | Mod: 95 | Performed by: UROLOGY

## 2022-09-26 NOTE — PROGRESS NOTES
HPI:  Krista Antonio is a 77 year old male being seen for AMY after RALP (12/2019) +EBRT (4/2021) for Prostate Cancer.    Incontinence was significant after RP, improved by year 1 post-op but then got worse after EBRT; currently only notices it when he exercises or pushes the . Dry at rest. Dry O/N. Urine flow is strong. Leakage is more at end of day but only one pad all day.     Exam:  There were no vitals taken for this visit.  GENERAL: Healthy, alert and no distress  EYES: Eyes grossly normal to inspection.  No discharge or erythema, or obvious scleral/conjunctival abnormalities.  RESP: No audible wheeze, cough, or visible cyanosis.  No visible retractions or increased work of breathing.    SKIN: Visible skin clear. No significant rash, abnormal pigmentation or lesions.  NEURO: Cranial nerves grossly intact.  Mentation and speech appropriate for age.  PSYCH: Mentation appears normal, affect normal/bright, judgement and insight intact, normal speech and appearance well-groomed.    Review of Imaging:  The following imaging exams were independently viewed and interpreted by me and discussed with patient:  None relevant    Review of Labs:  The following labs were reviewed by me and discussed with the patient:  PSA undetectable 8/5/22    Assessment & Plan   1. 76 y/o man with AMY after RALP+EBRT. Prostate cancer with ANTHONY.   2. Incontinence management options discussed include pads, clamp and AUS. Explained that success with sling is lower after EBRT but because his leakage is so minimal it might still be worth a try, especially if the tissue look mobile on cysto.   3. He understands the risks of AUS placement to include but not be limited to bleeding, infection, penile or scrotal pain/numbness, device infection or erosion, urethral atrophy and need for revision or replacement of the artifical sphincter (25%). He understands that I have consulted for [employer], the  of the  device.  4. We will plan a cysto and then decide between AUS and sling. Given near 100% success with AUS and 60% success with sling he leans towards a sling.      Luis Daniel Montgomery MD  [facility] UROLOGY [facility]      ==========================      Additional Coding Information:    Problems:  4 -- 2 illnesses    Data Reviewed  Review of the result(s) of each unique test - PSA      Level of risk:  4 -- minor surgery with patient or procedure risks    Time spent:  30 minutes spent on the date of the encounter doing chart review, history and exam, documentation and further activities per the note            Ledy is a 77 year old who is being evaluated via a billable video visit.      How would you like to obtain your AVS? MyChart  If the video visit is dropped, the invitation should be resent by: Text to cell phone: [phone]  Will anyone else be joining your video visit? No      Video-Visit Details    Video Start Time: 7:20    Type of service:  Video Visit    Video End Time:7:42 AM    Originating Location (pt. Location): Home    Distant Location (provider location):  [facility]OLOG[facility]     Platform used for Video Visit: [address]

## 2022-09-26 NOTE — LETTER
9/26/2022       RE: Krista Antonio  [address] [facility] [address]     Dear Colleague,    Thank you for referring your patient, Krista Antonio, to the [facility]OLOGY [facility] [place] at [facility]. Please see a copy of my visit note below.    HPI:  Krista Antonio is a 77 year old male being seen for AMY after RALP (12/2019) +EBRT (4/2021) for Prostate Cancer.    Incontinence was significant after RP, improved by year 1 post-op but then got worse after EBRT; currently only notices it when he exercises or pushes the . Dry at rest. Dry O/N. Urine flow is strong. Leakage is more at end of day but only one pad all day.     Exam:  There were no vitals taken for this visit.  GENERAL: Healthy, alert and no distress  EYES: Eyes grossly normal to inspection.  No discharge or erythema, or obvious scleral/conjunctival abnormalities.  RESP: No audible wheeze, cough, or visible cyanosis.  No visible retractions or increased work of breathing.    SKIN: Visible skin clear. No significant rash, abnormal pigmentation or lesions.  NEURO: Cranial nerves grossly intact.  Mentation and speech appropriate for age.  PSYCH: Mentation appears normal, affect normal/bright, judgement and insight intact, normal speech and appearance well-groomed.    Review of Imaging:  The following imaging exams were independently viewed and interpreted by me and discussed with patient:  None relevant    Review of Labs:  The following labs were reviewed by me and discussed with the patient:  PSA undetectable 8/5/22    Assessment & Plan   1. 78 y/o man with AMY after RALP+EBRT. Prostate cancer with ANTHONY.   2. Incontinence management options discussed include pads, clamp and AUS. Explained that success with sling is lower after EBRT but because his leakage is so minimal it might still be worth a try, especially if the tissue look mobile on cysto.   3. He understands the risks  of AUS placement to include but not be limited to bleeding, infection, penile or scrotal pain/numbness, device infection or erosion, urethral atrophy and need for revision or replacement of the artifical sphincter (25%). He understands that I have consulted for [employer], the  of the device.  4. We will plan a cysto and then decide between AUS and sling. Given near 100% success with AUS and 60% success with sling he leans towards a sling.      Luis Daniel Montgomery MD  [facility] UROLOGY [facility]      ==========================      Additional Coding Information:    Problems:  4 -- 2 illnesses    Data Reviewed  Review of the result(s) of each unique test - PSA      Level of risk:  4 -- minor surgery with patient or procedure risks    Time spent:  30 minutes spent on the date of the encounter doing chart review, history and exam, documentation and further activities per the note    Ledy is a 77 year old who is being evaluated via a billable video visit.      How would you like to obtain your AVS? MyChart  If the video visit is dropped, the invitation should be resent by: Text to cell phone: [phone]  Will anyone else be joining your video visit? No      Video-Visit Details    Video Start Time: 7:20  Type of service:  Video Visit  Video End Time:7:42 AM  Originating Location (pt. Location): Home  D[facility]   Platform used for Video Visit: [employer]

## 2022-10-15 ENCOUNTER — HEALTH MAINTENANCE LETTER (OUTPATIENT)
Age: 77
End: 2022-10-15

## 2022-11-27 ENCOUNTER — HEALTH MAINTENANCE LETTER (OUTPATIENT)
Age: 77
End: 2022-11-27

## 2023-01-02 ENCOUNTER — LAB (OUTPATIENT)
Dept: LAB | Facility: CLINIC | Age: 78
End: 2023-01-02
Payer: COMMERCIAL

## 2023-01-02 DIAGNOSIS — C61 PROSTATE CANCER (H): ICD-10-CM

## 2023-01-02 LAB — PSA SERPL-MCNC: <0.01 NG/ML (ref 0–6.5)

## 2023-01-02 PROCEDURE — 36415 COLL VENOUS BLD VENIPUNCTURE: CPT

## 2023-01-02 PROCEDURE — 84153 ASSAY OF PSA TOTAL: CPT

## 2023-01-27 ENCOUNTER — TELEPHONE (OUTPATIENT)
Dept: UROLOGY | Facility: CLINIC | Age: 78
End: 2023-01-27
Payer: COMMERCIAL

## 2023-01-27 NOTE — TELEPHONE ENCOUNTER
[facility] Call Center    Phone Message    May a detailed message be left on voicemail: yes     Reason for Call:  Patient called stating he went to get his PSA labs done a few weeks ago and forgot he wasn't suppose to do them until February 3rd. Patient wants to know if he can get new PSA lab orders placed before his 02/03 lab appointment. Please contact patient if this is something that can or can't be done. Thank you        Action Taken: Other: Urology    Travel Screening: Not Applicable

## 2023-01-27 NOTE — TELEPHONE ENCOUNTER
Spoke with patient, informed him that his 1/2/23 PSA will suffice for his appointment on 2/9/23 with Dr. Kinsey, no need for another PSA to be drawn.  Patient states understanding.    Teodora Sanchez RN, BSN  [facility]  [phone]

## 2023-02-03 ENCOUNTER — LAB (OUTPATIENT)
Dept: LAB | Facility: CLINIC | Age: 78
End: 2023-02-03
Payer: COMMERCIAL

## 2023-02-03 DIAGNOSIS — C61 PROSTATE CANCER (H): ICD-10-CM

## 2023-02-03 DIAGNOSIS — C61 PROSTATE CANCER (H): Primary | ICD-10-CM

## 2023-02-03 LAB — PSA SERPL-MCNC: <0.01 NG/ML (ref 0–6.5)

## 2023-02-03 PROCEDURE — 84153 ASSAY OF PSA TOTAL: CPT

## 2023-02-03 PROCEDURE — 36415 COLL VENOUS BLD VENIPUNCTURE: CPT

## 2023-02-09 ENCOUNTER — VIRTUAL VISIT (OUTPATIENT)
Dept: UROLOGY | Facility: CLINIC | Age: 78
End: 2023-02-09
Payer: COMMERCIAL

## 2023-02-09 VITALS — WEIGHT: 175 LBS | BODY MASS INDEX: 25.84 KG/M2

## 2023-02-09 DIAGNOSIS — C61 PROSTATE CANCER (H): Primary | ICD-10-CM

## 2023-02-09 PROCEDURE — 99213 OFFICE O/P EST LOW 20 MIN: CPT | Mod: VID | Performed by: UROLOGY

## 2023-02-09 ASSESSMENT — PAIN SCALES - GENERAL: PAINLEVEL: NO PAIN (0)

## 2023-02-09 NOTE — PROGRESS NOTES
Krista Antonio is a 77 year old male who is being evaluated via a billable video visit.      How would you like to obtain your AVS? [employer]  If the video visit is dropped, the invitation should be resent by: Text to cell phone: [phone]  Will anyone else be joining your video visit? No    Video Start Time: 9:51 AM    CHIEF COMPLAINT   It was my pleasure to see Krista Antonio who is a 77 year old male for follow-up of Prostate Cancer.      HPI  Krista Antonio is a very pleasant 77 year old male who presents with a history of prostate cancer. He is s/p RALP completed 12/19/2019.  PSA calli to 0.61. Salvage radiotherapy completed 4/2021. Tolerated radiation well, but did note some fatigue and a little worsening of his continence.  Noted ongoing improvement with continence over the first year after surgery but still with some leakage. Very active with pushups, Judo, etc. And has some ongoing bothersome incontinence with activity. Is interested in discussing possible intervention for this. Looking into a sling with Dr. Montgomery.      PSA  2/3/2023 - <0.01  8/5/2022 -  0.01  2/7/2022 - <0.01  7/30/2021 - <0.01  4/30/2021 - <0.01  12/29/2020 - 0.61  9/24/2020 - 0.18  6/22/2020 - 0.05  3/16/2020 - 0.01     RALP 12/19/2019   Tumor        Histologic Type:   Adenocarcinoma, mixed: small acinar and ductal   (Ductal: Between 15 and 20% of tumor   area).             Primary Pattern:   4 (60%)             Secondary Pattern:   3 (40%).             Total Hanalei Score:   7 (Grade group 3).        Extraprostatic Extension:   Present: Focal: Left posterior inferior.        Seminal Vesicle Invasion:   Not identified     Margins:   Margins uninvolved by invasive carcinoma      Pathologic Staging (pTNM)        Primary Tumor (pT):    pT3a:  Extraprostatic extension.        Regional Lymph Nodes (pN):   pN0             Number of Lymph Nodes Examined:   11             Number of Lymph Nodes Involved:      0        Distant  Metastasis (pM):    pM N/A.          Allergies:   Atorvastatin, Simvastatin, and Sulfa drugs         Review of Systems:  From intake questionnaire     Skin: negative  Eyes: negative  Ears/Nose/Throat: negative  Respiratory: No shortness of breath, dyspnea on exertion, cough, or hemoptysis  Cardiovascular: No chest pain or palpitations  Gastrointestinal: negative; no nausea/vomiting, constipation or diarrhea  Genitourinary: as per HPI  Musculoskeletal: negative  Neurologic: negative  Psychiatric: negative  Hematologic/Lymphatic/Immunologic: negative  Endocrine: negative         Physical Exam:     Vitals:  No vitals were obtained today due to virtual visit.    Physical Exam   GENERAL: Healthy, alert and no distress  EYES: Eyes grossly normal to inspection.  No discharge or erythema, or obvious scleral/conjunctival abnormalities.  RESP: No audible wheeze, cough, or visible cyanosis.  No visible retractions or increased work of breathing.    SKIN: Visible skin clear. No significant rash, abnormal pigmentation or lesions.  NEURO: Cranial nerves grossly intact.  Mentation and speech appropriate for age.  PSYCH: Mentation appears normal, affect normal/bright, judgement and insight intact, normal speech and appearance well-groomed.      Outside and Past Medical records:    Review of the result(s) of each unique test - PSA         Assessment and Plan:     77 year old male with pT3a prostate cancer, Jimbo 3+4, s/p RALP 12/19/2019. Negative margins. PSA had risen to 0.61. He has now completed salvage radiotherapy as of 4/2021 and tolerated this well. Did Lupron with the radiation. Will not plan additional ADT at this time. PSA is again undetectable.      Did have slight worsening of continence toward the end of radiotherapy. He is still bothered by stress incontinence with activity and we discussed option of consultation with reconstructive urology to review sling vs AUS. He plans to see Dr. Montgomery again later this month. I  interested in sling, but does not want AUS.     - Follow-up with Dr. Montgomery, as scheduled  - Follow-up with me in 6 months with PSA    Orders  Orders Placed This Encounter   Procedures     PSA tumor marker [[id number]]     Video-Visit Details    Type of service:  Video Visit    Video End Time:10:02 AM    Originating Location (pt. Location): Home    Distant Location (provider location):  On-site    Platform used for Video Visit: [employer]    15 minutes spent on the date of the encounter including direct interaction with the patient, performing chart review, history and exam, documentation and further activities as noted above.    Spencer Kinsey MD  Urology  [facility]

## 2023-02-10 ENCOUNTER — PRE VISIT (OUTPATIENT)
Dept: UROLOGY | Facility: CLINIC | Age: 78
End: 2023-02-10
Payer: COMMERCIAL

## 2023-02-10 NOTE — TELEPHONE ENCOUNTER
Reason for visit: Cystoscopy         Relevant information: evaluate urethra - sling vs. AUS    Records/imaging/labs/orders: all records available    Pt called: no need for a call    At Rooming: Antonino Gtz CMA  2/10/2023  12:01 PM

## 2023-02-21 ENCOUNTER — OFFICE VISIT (OUTPATIENT)
Dept: UROLOGY | Facility: CLINIC | Age: 78
End: 2023-02-21
Payer: COMMERCIAL

## 2023-02-21 DIAGNOSIS — N39.3 MALE URINARY STRESS INCONTINENCE: Primary | ICD-10-CM

## 2023-02-21 PROCEDURE — 52000 CYSTOURETHROSCOPY: CPT | Mod: GC | Performed by: UROLOGY

## 2023-02-21 RX ORDER — LIDOCAINE HYDROCHLORIDE 20 MG/ML
JELLY TOPICAL ONCE
Status: COMPLETED | OUTPATIENT
Start: 2023-02-21 | End: 2023-02-21

## 2023-02-21 RX ADMIN — LIDOCAINE HYDROCHLORIDE: 20 JELLY TOPICAL at 15:13

## 2023-02-21 NOTE — NURSING NOTE
Chief Complaint   Patient presents with     Cystoscopy       There were no vitals taken for this visit. There is no height or weight on file to calculate BMI.    Patient Active Problem List   Diagnosis     Cholelithiasis     Prostate cancer (H)     Adenomatous polyp of colon     Elevated PSA     Essential hypertension     Male erectile disorder     Primary open angle glaucoma of both eyes, mild stage     Pure hypercholesterolemia     Type II diabetes mellitus, well controlled (H)       Allergies   Allergen Reactions     Atorvastatin      PN: arthralgias     Simvastatin      PN: arthralgias     Sulfa Drugs Rash       Current Outpatient Medications   Medication Sig Dispense Refill     ASPIRIN EC PO Take 81 mg by mouth every evening        Coenzyme Q10 (COQ10 PO) Take 1 tablet by mouth every evening        erythromycin (ROMYCIN) 5 MG/GM ophthalmic ointment        latanoprost (XALATAN) 0.005 % ophthalmic solution PLACE 1 DROP INTO BOTH EYES EVERY NIGHT AT BEDTIME  10     metFORMIN (GLUCOPHAGE-XR) 500 MG 24 hr tablet Take 1,000 mg by mouth daily (with dinner)        multivitamin, therapeutic (THERA-VIT) TABS tablet Take 1 tablet by mouth every evening        tadalafil (CIALIS) 5 MG tablet TAKE ONE TABLET BY MOUTH ONE TIME DAILY AS NEEDED         Social History     Tobacco Use     Smoking status: Never     Smokeless tobacco: Never   Substance Use Topics     Alcohol use: Yes     Comment: 0-1 drinks per month     Drug use: Never       Invasive Procedure Safety Checklist:    Procedure: Cystoscopy    Action: Complete sections and checkboxes as appropriate.    Pre-procedure:  1. Patient ID Verified with 2 identifiers (Lydia and  or MRN) : YES    2. Procedure and site verified with patient/designee (when able) : YES    3. Accurate consent documentation in medical record : YES    4. H&P (or appropriate assessment) documented in medical record : N/A  H&P must be up to 30 days prior to procedure an updated within 24 hours of                  Procedure as applicable.     5. Relevant diagnostic and radiology test results appropriately labeled and displayed as applicable : YES    6. Blood products, implants, devices, and/or special equipment available for the procedure as applicable : YES    7. Procedure site(s) marked with provider initials [Exclusions: none] : NO    8. Marking not required. Reason : Yes  Procedure does not require site marking    Time Out:     Time-Out performed immediately prior to starting procedure, including verbal and active participation of all team members addressing: YES    1. Correct patient identity.  2. Confirmed that the correct side and site are marked.  3. An accurate procedure to be done.  4. Agreement on the procedure to be done.  5. Correct patient position.  6. Relevant images and results are properly labeled and appropriately displayed.  7. The need to administer antibiotics or fluids for irrigation purposes during the procedure as applicable.  8. Safety precautions based on patient history or medication use.    During Procedure: Verification of correct person, site, and procedure occurs any time the responsibility for care of the patient is transferred to another member of the care team.    The following medication was given:     MEDICATION:  Lidocaine without epinephrine 2% jelly  ROUTE: urethral   SITE: urethral   DOSE: 10 mL  LOT #: [id number]  : [employer]  EXPIRATION DATE: 9/24  NDC#: [address]-6   Was there drug waste? No    Prior to med admin, verified patient identity using patient's name and date of birth.  Due to med administration, patient instructed to remain in clinic for 15 minutes  afterwards, and to report any adverse reaction to me immediately.    Drug Amount Wasted:  None.  Vial/Syringe: Syringe      Petros Chowdhury EMT-P  2/21/2023  3:13 PM

## 2023-02-21 NOTE — PROGRESS NOTES
HPI:  Krista Antonio is a 77 year old male being seen for urinary incontinence.    He presents today for cystoscopic evaluation for stress urinary incontinence, consideration of sling vs AUS. He has a history of RALP in 2019 and EBRT in 2021. He had post-prostatectomy incontinence that initially improved, then worsened following EBRT.     The following subcategories of the HISTORY were reviewed with the patient and updated accordingly. If no updates, this indicates no change since last visit:    Reviewed previous notes from Dr. Kinsey and Dr. Montgomery from urology service at [facility]    Exam:  There were no vitals taken for this visit.  General: age-appropriate appearing male in NAD sitting in an exam chair  HEENT: Head AT/NC, EOMI, CN Grossly intact.  Resp: no respiratory distress  CV: hemodynamically stable, no cyanosis  : penis normal without urethral discharge    Review of Imaging:  The following imaging exams were independently viewed and interpreted by me and discussed with patient:  No new interval imaging    Review of Labs:  The following labs were reviewed by me and discussed with the patient:  Recent Results (from the past 720 hour(s))   PSA tumor marker (Send Out) [[id number]]    Collection Time: 02/03/23  9:25 AM   Result Value Ref Range    PSA Tumor Marker <0.01 0.00 - 6.50 ng/mL     Cystoscopy:  After written informed consent was obtained, patient was prepped and draped in standard sterile fashion. A timeout was performed for patient safety. A flexible cystoscope was unable to be inserted due to narrowing of the fossa navicularis. I dilated fossa to 18F and the scope still would not pass. A flexible ureteroscope was then inserted. The urethra was unremarkable. The prostate was surgically absent. There were no tumors, stones or diverticula. Engagement of voluntary sphincter demonstrated good coaptation. The ureteroscope was then removed.    Given adequate coaptation on urethroscopy, it is reasonable to  proceed with sling placement at this time. Patient prefers to avoid AUS. He understands 60% short term success with sling and lower long-term success, given radiation.     Assessment & Plan   1. Schedule for sling for AMY    Marcus Lewis MD  Urology Resident, PGY-4    As the attending surgeon I, Luis Daniel Montgomery, was present and scrubbed throughout the procedure.

## 2023-02-21 NOTE — LETTER
2/21/2023       RE: Krista Antonio  [address] [facility] [address]     Dear Colleague,    Thank you for referring your patient, Krista Antonio, to the [facility]OLOGY CLINIC [place] at [facility]. Please see a copy of my visit note below.    HPI:  Krista Antonio is a 77 year old male being seen for urinary incontinence.    He presents today for cystoscopic evaluation for stress urinary incontinence, consideration of sling vs AUS. He has a history of RALP in 2019 and EBRT in 2021. He had post-prostatectomy incontinence that initially improved, then worsened following EBRT.     The following subcategories of the HISTORY were reviewed with the patient and updated accordingly. If no updates, this indicates no change since last visit:    Reviewed previous notes from Dr. Kinsey and Dr. Montgomery from urology service at [facility]    Exam:  There were no vitals taken for this visit.  General: age-appropriate appearing male in NAD sitting in an exam chair  HEENT: Head AT/NC, EOMI, CN Grossly intact.  Resp: no respiratory distress  CV: hemodynamically stable, no cyanosis  : penis normal without urethral discharge    Review of Imaging:  The following imaging exams were independently viewed and interpreted by me and discussed with patient:  No new interval imaging    Review of Labs:  The following labs were reviewed by me and discussed with the patient:  Recent Results (from the past 720 hour(s))   PSA tumor marker (Send Out) [[id number]]    Collection Time: 02/03/23  9:25 AM   Result Value Ref Range    PSA Tumor Marker <0.01 0.00 - 6.50 ng/mL     Cystoscopy:  After written informed consent was obtained, patient was prepped and draped in standard sterile fashion. A timeout was performed for patient safety. A flexible cystoscope was unable to be inserted due to narrowing of the fossa navicularis. I dilated fossa to 18F and the scope still would not pass. A  flexible ureteroscope was then inserted. The urethra was unremarkable. The prostate was surgically absent. There were no tumors, stones or diverticula. Engagement of voluntary sphincter demonstrated good coaptation. The ureteroscope was then removed.    Given adequate coaptation on urethroscopy, it is reasonable to proceed with sling placement at this time. Patient prefers to avoid AUS. He understands 60% short term success with sling and lower long-term success, given radiation.     Assessment & Plan   1. Schedule for sling for AMY    Marcus Lewis MD  Urology Resident, PGY-4    As the attending surgeon I, Luis Daniel Montgomery, was present and scrubbed throughout the procedure.    Pad Weight: 12.8oz      Luis Daniel Montgomery MD

## 2023-03-13 LAB — PSA SERPL-MCNC: <0.01 NG/ML (ref 0–6.5)

## 2023-03-26 ENCOUNTER — HEALTH MAINTENANCE LETTER (OUTPATIENT)
Age: 78
End: 2023-03-26

## 2023-08-03 ENCOUNTER — LAB (OUTPATIENT)
Dept: LAB | Facility: CLINIC | Age: 78
End: 2023-08-03
Payer: COMMERCIAL

## 2023-08-03 DIAGNOSIS — C61 PROSTATE CANCER (H): ICD-10-CM

## 2023-08-03 LAB — PSA SERPL DL<=0.01 NG/ML-MCNC: <0.01 NG/ML (ref 0–6.5)

## 2023-08-03 PROCEDURE — 36415 COLL VENOUS BLD VENIPUNCTURE: CPT

## 2023-08-03 PROCEDURE — 84153 ASSAY OF PSA TOTAL: CPT

## 2023-08-10 ENCOUNTER — VIRTUAL VISIT (OUTPATIENT)
Dept: UROLOGY | Facility: CLINIC | Age: 78
End: 2023-08-10
Payer: COMMERCIAL

## 2023-08-10 VITALS — HEIGHT: 69 IN | WEIGHT: 175 LBS | BODY MASS INDEX: 25.92 KG/M2

## 2023-08-10 DIAGNOSIS — C61 PROSTATE CANCER (H): Primary | ICD-10-CM

## 2023-08-10 PROCEDURE — 99213 OFFICE O/P EST LOW 20 MIN: CPT | Mod: VID | Performed by: UROLOGY

## 2023-08-10 RX ORDER — ROSUVASTATIN CALCIUM 5 MG/1
5 TABLET, COATED ORAL
COMMUNITY
Start: 2023-07-17 | End: 2024-07-16

## 2023-08-10 ASSESSMENT — PAIN SCALES - GENERAL: PAINLEVEL: NO PAIN (0)

## 2023-08-10 NOTE — Clinical Note
8/10/2023       RE: Krista Antonio  [address] [facility] [address]     Dear Colleague,    Thank you for referring your patient, Krista Antonio, to the [facility] at [facility]. Please see a copy of my visit note below.    Krista Antonio is a 78 year old male who is being evaluated via a billable video visit.      How would you like to obtain your AVS? MyChart  If the video visit is dropped, the invitation should be resent by: my chart  Will anyone else be joining your video visit? No    Video Start Time: 11:52 AM    CHIEF COMPLAINT   It was my pleasure to see Krista Antonio who is a 78 year old male for follow-up of Prostate Cancer.      HPI  Krista Antonio is a very pleasant 78 year old male who presents with a history of prostate cancer. He is s/p RALP completed 12/19/2019.  PSA calli to 0.61. Salvage radiotherapy completed 4/2021. Tolerated radiation well, but did note some fatigue and a little worsening of his continence.    Noted ongoing improvement with continence over the first year after surgery but still with some leakage. Very active with pushups, Judo, etc. And has some ongoing bothersome incontinence with activity. Is interested in discussing possible intervention for this.     Looked into a sling with Dr. Montgomery but decided to avoid this.    Testosterone 230     PSA  8/3/2023 - <0.01  2/3/2023 - <0.01  8/5/2022 -  0.01  2/7/2022 - <0.01  7/30/2021 - <0.01  4/30/2021 - <0.01  12/29/2020 - 0.61  9/24/2020 - 0.18  6/22/2020 - 0.05  3/16/2020 - 0.01     RALP 12/19/2019   Tumor        Histologic Type:   Adenocarcinoma, mixed: small acinar and ductal   (Ductal: Between 15 and 20% of tumor   area).             Primary Pattern:   4 (60%)             Secondary Pattern:   3 (40%).             Total [place] Score:   7 (Grade group 3).        Extraprostatic Extension:   Present: Focal: Left posterior inferior.         Seminal Vesicle Invasion:   Not identified     Margins:   Margins uninvolved by invasive carcinoma      Pathologic Staging (pTNM)        Primary Tumor (pT):    pT3a:  Extraprostatic extension.        Regional Lymph Nodes (pN):   pN0             Number of Lymph Nodes Examined:   11             Number of Lymph Nodes Involved:      0        Distant Metastasis (pM):    pM N/A.          Allergies:   Atorvastatin, Simvastatin, and Sulfa antibiotics         Review of Systems:  From intake questionnaire     Skin: negative  Eyes: negative  Ears/Nose/Throat: negative  Respiratory: No shortness of breath, dyspnea on exertion, cough, or hemoptysis  Cardiovascular: No chest pain or palpitations  Gastrointestinal: negative; no nausea/vomiting, constipation or diarrhea  Genitourinary: as per HPI  Musculoskeletal: negative  Neurologic: negative  Psychiatric: negative  Hematologic/Lymphatic/Immunologic: negative  Endocrine: negative         Physical Exam:     Vitals:  No vitals were obtained today due to virtual visit.    Physical Exam   GENERAL: Healthy, alert and no distress  EYES: Eyes grossly normal to inspection.  No discharge or erythema, or obvious scleral/conjunctival abnormalities.  RESP: No audible wheeze, cough, or visible cyanosis.  No visible retractions or increased work of breathing.    SKIN: Visible skin clear. No significant rash, abnormal pigmentation or lesions.  NEURO: Cranial nerves grossly intact.  Mentation and speech appropriate for age.  PSYCH: Mentation appears normal, affect normal/bright, judgement and insight intact, normal speech and appearance well-groomed.      Outside and Past Medical records:    Review of the result(s) of each unique test - PSA         Assessment and Plan:     78 year old male with pT3a prostate cancer, Jimbo 3+4, s/p RALP 12/19/2019. Negative margins. PSA had risen to 0.61. He has now completed salvage radiotherapy as of 4/2021 and tolerated this well. Did Lupron with the  "radiation. Will not plan additional ADT at this time. PSA is again undetectable.      Did have slight worsening of continence toward the end of radiotherapy. He is still bothered by stress incontinence with activity and we discussed option of consultation with reconstructive urology to review sling vs AUS. He plans to see Dr. Montgomery again later this month. I interested in sling, but does not want AUS.     - Follow-up with me in 6 months with PSA    Orders  Orders Placed This Encounter   Procedures    PSA tumor marker [[id number]]         Video-Visit Details    Type of service:  Video Visit    Video End Time:{video visit start/end time for provider to select:298507}    Originating Location (pt. Location): {video visit patient location:763107::\"Home\"}    Distant Location (provider location):  {virtual location provider:071414}    Platform used for Video Visit: {Virtual Visit Platforms:142312::\"[employer]\"}    *** minutes spent on the date of the encounter including direct interaction with the patient, performing chart review, history and exam, documentation and further activities as noted above.    Spencer Kinsey MD  Urology  [facility]      Krista Antonio is a 78 year old male who is being evaluated via a billable video visit.      How would you like to obtain your AVS? MyChart  If the video visit is dropped, the invitation should be resent by: my chart  Will anyone else be joining your video visit? No    Video Start Time: 11:52 AM    CHIEF COMPLAINT   It was my pleasure to see Krista Antonio who is a 78 year old male for follow-up of Prostate Cancer.      HPI  Aarontheron Antonio is a very pleasant 78 year old male who presents with a history of prostate cancer. He is s/p RALP completed 12/19/2019.  PSA calli to 0.61. Salvage radiotherapy completed 4/2021. Tolerated radiation well, but did note some fatigue and a little worsening of his continence.    Noted ongoing improvement with continence " over the first year after surgery but still with some leakage. Very active with pushups, Judo, etc. And has some ongoing bothersome incontinence with activity. Looked into a sling with Dr. Montgomery but decided not to have any additional surgery.    Testosterone 230     PSA  8/3/2023 - <0.01  2/3/2023 - <0.01  8/5/2022 -  0.01  2/7/2022 - <0.01  7/30/2021 - <0.01  4/30/2021 - <0.01  12/29/2020 - 0.61  9/24/2020 - 0.18  6/22/2020 - 0.05  3/16/2020 - 0.01     RALP 12/19/2019   Tumor        Histologic Type:   Adenocarcinoma, mixed: small acinar and ductal   (Ductal: Between 15 and 20% of tumor   area).             Primary Pattern:   4 (60%)             Secondary Pattern:   3 (40%).             Total [place] Score:   7 (Grade group 3).        Extraprostatic Extension:   Present: Focal: Left posterior inferior.        Seminal Vesicle Invasion:   Not identified     Margins:   Margins uninvolved by invasive carcinoma      Pathologic Staging (pTNM)        Primary Tumor (pT):    pT3a:  Extraprostatic extension.        Regional Lymph Nodes (pN):   pN0             Number of Lymph Nodes Examined:   11             Number of Lymph Nodes Involved:      0        Distant Metastasis (pM):    pM N/A.          Allergies:   Atorvastatin, Simvastatin, and Sulfa antibiotics         Review of Systems:  From intake questionnaire     Skin: negative  Eyes: negative  Ears/Nose/Throat: negative  Respiratory: No shortness of breath, dyspnea on exertion, cough, or hemoptysis  Cardiovascular: No chest pain or palpitations  Gastrointestinal: negative; no nausea/vomiting, constipation or diarrhea  Genitourinary: as per HPI  Musculoskeletal: negative  Neurologic: negative  Psychiatric: negative  Hematologic/Lymphatic/Immunologic: negative  Endocrine: negative         Physical Exam:     Vitals:  No vitals were obtained today due to virtual visit.    Physical Exam   GENERAL: Healthy, alert and no distress  EYES: Eyes grossly normal to inspection.  No  discharge or erythema, or obvious scleral/conjunctival abnormalities.  RESP: No audible wheeze, cough, or visible cyanosis.  No visible retractions or increased work of breathing.    SKIN: Visible skin clear. No significant rash, abnormal pigmentation or lesions.  NEURO: Cranial nerves grossly intact.  Mentation and speech appropriate for age.  PSYCH: Mentation appears normal, affect normal/bright, judgement and insight intact, normal speech and appearance well-groomed.      Outside and Past Medical records:    Review of the result(s) of each unique test - PSA         Assessment and Plan:     78 year old male with pT3a prostate cancer, Jimbo 3+4, s/p RALP 12/19/2019. Negative margins. PSA had risen to 0.61. He has now completed salvage radiotherapy as of 4/2021 and tolerated this well. Did Lupron with the radiation. PSA is again undetectable.      Did have slight worsening of continence toward the end of radiotherapy. He saw Dr. Montgomery but decided to forego additional surgery.     - Follow-up with me in 6 months with PSA    Orders  Orders Placed This Encounter   Procedures     PSA tumor marker [[id number]]     Video-Visit Details    Type of service:  Video Visit    Video End Time: 12:00 PM    Originating Location (pt. Location): Home    Distant Location (provider location):  On-site    Platform used for Video Visit: [employer]    10 minutes spent on the date of the encounter including direct interaction with the patient, performing chart review, history and exam, documentation and further activities as noted above.    Spencer Kinsey MD  Urology  [facility]        Again, thank you for allowing me to participate in the care of your patient.      Sincerely,    Spencer Kinsey MD

## 2023-08-10 NOTE — PROGRESS NOTES
Krista Antonio is a 78 year old male who is being evaluated via a billable video visit.      How would you like to obtain your AVS? MyChart  If the video visit is dropped, the invitation should be resent by: my chart  Will anyone else be joining your video visit? No    Video Start Time: 11:52 AM    CHIEF COMPLAINT   It was my pleasure to see Krista Antonio who is a 78 year old male for follow-up of Prostate Cancer.      HPI  Krista Antonio is a very pleasant 78 year old male who presents with a history of prostate cancer. He is s/p RALP completed 12/19/2019.  PSA calli to 0.61. Salvage radiotherapy completed 4/2021. Tolerated radiation well, but did note some fatigue and a little worsening of his continence.    Noted ongoing improvement with continence over the first year after surgery but still with some leakage. Very active with pushups, Judo, etc. And has some ongoing bothersome incontinence with activity. Looked into a sling with Dr. Montgomery but decided not to have any additional surgery.    Testosterone 230     PSA  8/3/2023 - <0.01  2/3/2023 - <0.01  8/5/2022 -  0.01  2/7/2022 - <0.01  7/30/2021 - <0.01  4/30/2021 - <0.01  12/29/2020 - 0.61  9/24/2020 - 0.18  6/22/2020 - 0.05  3/16/2020 - 0.01     RALP 12/19/2019   Tumor        Histologic Type:   Adenocarcinoma, mixed: small acinar and ductal   (Ductal: Between 15 and 20% of tumor   area).             Primary Pattern:   4 (60%)             Secondary Pattern:   3 (40%).             Total [place] Score:   7 (Grade group 3).        Extraprostatic Extension:   Present: Focal: Left posterior inferior.        Seminal Vesicle Invasion:   Not identified     Margins:   Margins uninvolved by invasive carcinoma      Pathologic Staging (pTNM)        Primary Tumor (pT):    pT3a:  Extraprostatic extension.        Regional Lymph Nodes (pN):   pN0             Number of Lymph Nodes Examined:   11             Number of Lymph Nodes Involved:      0        Distant  Metastasis (pM):    pM N/A.          Allergies:   Atorvastatin, Simvastatin, and Sulfa antibiotics         Review of Systems:  From intake questionnaire     Skin: negative  Eyes: negative  Ears/Nose/Throat: negative  Respiratory: No shortness of breath, dyspnea on exertion, cough, or hemoptysis  Cardiovascular: No chest pain or palpitations  Gastrointestinal: negative; no nausea/vomiting, constipation or diarrhea  Genitourinary: as per HPI  Musculoskeletal: negative  Neurologic: negative  Psychiatric: negative  Hematologic/Lymphatic/Immunologic: negative  Endocrine: negative         Physical Exam:     Vitals:  No vitals were obtained today due to virtual visit.    Physical Exam   GENERAL: Healthy, alert and no distress  EYES: Eyes grossly normal to inspection.  No discharge or erythema, or obvious scleral/conjunctival abnormalities.  RESP: No audible wheeze, cough, or visible cyanosis.  No visible retractions or increased work of breathing.    SKIN: Visible skin clear. No significant rash, abnormal pigmentation or lesions.  NEURO: Cranial nerves grossly intact.  Mentation and speech appropriate for age.  PSYCH: Mentation appears normal, affect normal/bright, judgement and insight intact, normal speech and appearance well-groomed.      Outside and Past Medical records:    Review of the result(s) of each unique test - PSA         Assessment and Plan:     78 year old male with pT3a prostate cancer, Jimbo 3+4, s/p RALP 12/19/2019. Negative margins. PSA had risen to 0.61. He has now completed salvage radiotherapy as of 4/2021 and tolerated this well. Did Lupron with the radiation. PSA is again undetectable.      Did have slight worsening of continence toward the end of radiotherapy. He saw Dr. Montgomery but decided to forego additional surgery.     - Follow-up with me in 6 months with PSA    Orders  Orders Placed This Encounter   Procedures    PSA tumor marker [[id number]]     Video-Visit Details    Type of service:  Video  Visit    Video End Time: 12:00 PM    Originating Location (pt. Location): Home    Distant Location (provider location):  On-site    Platform used for Video Visit: [employer]    10 minutes spent on the date of the encounter including direct interaction with the patient, performing chart review, history and exam, documentation and further activities as noted above.    Spencer Kinsey MD  Urology  [facility]

## 2023-08-20 ENCOUNTER — HEALTH MAINTENANCE LETTER (OUTPATIENT)
Age: 78
End: 2023-08-20

## 2024-01-07 ENCOUNTER — HEALTH MAINTENANCE LETTER (OUTPATIENT)
Age: 79
End: 2024-01-07

## 2024-02-01 ENCOUNTER — LAB (OUTPATIENT)
Dept: LAB | Facility: CLINIC | Age: 79
End: 2024-02-01
Payer: COMMERCIAL

## 2024-02-01 DIAGNOSIS — C61 PROSTATE CANCER (H): ICD-10-CM

## 2024-02-01 LAB — PSA SERPL DL<=0.01 NG/ML-MCNC: <0.01 NG/ML (ref 0–6.5)

## 2024-02-01 PROCEDURE — 36415 COLL VENOUS BLD VENIPUNCTURE: CPT

## 2024-02-01 PROCEDURE — 84153 ASSAY OF PSA TOTAL: CPT

## 2024-02-08 ENCOUNTER — VIRTUAL VISIT (OUTPATIENT)
Dept: UROLOGY | Facility: CLINIC | Age: 79
End: 2024-02-08
Payer: COMMERCIAL

## 2024-02-08 DIAGNOSIS — C61 PROSTATE CANCER (H): Primary | ICD-10-CM

## 2024-02-08 PROCEDURE — 99213 OFFICE O/P EST LOW 20 MIN: CPT | Mod: 95 | Performed by: UROLOGY

## 2024-02-08 ASSESSMENT — PAIN SCALES - GENERAL: PAINLEVEL: NO PAIN (0)

## 2024-02-08 NOTE — NURSING NOTE
Is the patient currently in the state of [place]? YES    Visit mode:VIDEO    If the visit is dropped, the patient can be reconnected by: VIDEO VISIT: Text to cell phone:   Telephone Information:   Mobile [phone]       Will anyone else be joining the visit? NO  (If patient encounters technical issues they should call [phone])    How would you like to obtain your AVS? MyChart    Are changes needed to the allergy or medication list? No    Reason for visit: RECHECK    Jennifer PELAEZ

## 2024-02-08 NOTE — Clinical Note
"2/8/2024       RE: Krista Antonio  [address] [facility] [address]     Dear Colleague,    Thank you for referring your patient, Krista Antonio, to the [facility] at [facility]. Please see a copy of my visit note below.    Virtual Visit Details    Type of service:  Video Visit   Video Start Time: {video visit start/end time for provider to select:083018}  Video End Time:{video visit start/end time for provider to select:582865}    Originating Location (pt. Location): {video visit patient location:250104::\"Home\"}  {PROVIDER LOCATION On-site should be selected for visits conducted from your clinic location or adjoining [facility] hospital, academic office, or other nearby [facility] building. Off-site should be selected for all other provider locations, including home:367610}  Distant Location (provider location):  {virtual location provider:777625}  Platform used for Video Visit: {Virtual Visit Platforms:480657::\"[employer]\"}    Krista Antonio is a 78 year old male who is being evaluated via a billable video visit.      How would you like to obtain your AVS? MyChart  If the video visit is dropped, the invitation should be resent by: Text to cell phone: [phone]  Will anyone else be joining your video visit? No    Video Start Time: 9:46 AM    CHIEF COMPLAINT   It was my pleasure to see Krista Antonio who is a 78 year old male for follow-up of Prostate Cancer.      HPI  Krista Antonio is a very pleasant 78 year old male who presents with a history of prostate cancer. He is s/p RALP completed 12/19/2019.  PSA calli to 0.61. Salvage radiotherapy completed 4/2021. Tolerated radiation well, but did note some fatigue and a little worsening of his continence.     Noted ongoing improvement with continence over the first year after surgery but still with some leakage. Very active with pushups, Judo, etc. And has some ongoing bothersome " incontinence with activity. Stamina improving. Looked into a sling with Dr. Montgomery but decided not to have any additional surgery. Currently undergoing workup for possible myasthenia gravis.     Testosterone 230     PSA  2/1/2024 - <0.01  8/3/2023 - <0.01  2/3/2023 - <0.01  8/5/2022 -  0.01  2/7/2022 - <0.01  7/30/2021 - <0.01  4/30/2021 - <0.01  12/29/2020 - 0.61  9/24/2020 - 0.18  6/22/2020 - 0.05  3/16/2020 - 0.01     RALP 12/19/2019   Tumor        Histologic Type:   Adenocarcinoma, mixed: small acinar and ductal   (Ductal: Between 15 and 20% of tumor   area).             Primary Pattern:   4 (60%)             Secondary Pattern:   3 (40%).             Total [place] Score:   7 (Grade group 3).        Extraprostatic Extension:   Present: Focal: Left posterior inferior.        Seminal Vesicle Invasion:   Not identified     Margins:   Margins uninvolved by invasive carcinoma      Pathologic Staging (pTNM)        Primary Tumor (pT):    pT3a:  Extraprostatic extension.        Regional Lymph Nodes (pN):   pN0             Number of Lymph Nodes Examined:   11             Number of Lymph Nodes Involved:      0        Distant Metastasis (pM):    pM N/A.          Allergies:   Atorvastatin, Simvastatin, and Sulfa antibiotics         Review of Systems:  From intake questionnaire     Skin: negative  Eyes: negative  Ears/Nose/Throat: negative  Respiratory: No shortness of breath, dyspnea on exertion, cough, or hemoptysis  Cardiovascular: No chest pain or palpitations  Gastrointestinal: negative; no nausea/vomiting, constipation or diarrhea  Genitourinary: as per HPI  Musculoskeletal: negative  Neurologic: negative  Psychiatric: negative  Hematologic/Lymphatic/Immunologic: negative  Endocrine: negative         Physical Exam:     Vitals:  No vitals were obtained today due to virtual visit.    Physical Exam   EYES: Eyes grossly normal to inspection.  No discharge or erythema, or obvious scleral/conjunctival abnormalities.  SKIN:  Visible skin clear. No significant rash, abnormal pigmentation or lesions.  NEURO: Cranial nerves grossly intact.  Mentation and speech appropriate for age.  GENERAL: Healthy, alert and no distress  RESP: No audible wheeze, cough, or visible cyanosis.  No visible retractions or increased work of breathing.    PSYCH: Mentation appears normal, affect normal/bright, judgement and insight intact, normal speech and appearance well-groomed.      Outside and Past Medical records:    Review of the result(s) of each unique test - PSA          Assessment and Plan:      78 year old male with pT3a prostate cancer, Jimbo 3+4, s/p RALP 12/19/2019. Negative margins. PSA had risen to 0.61. He has now completed salvage radiotherapy as of 4/2021 and tolerated this well. Did Lupron with the radiation. PSA is again undetectable.      Did have slight worsening of continence toward the end of radiotherapy. He saw Dr. Montgomery but decided to forego additional surgery.     - Follow-up with me in 6 months with PSA    Orders  Orders Placed This Encounter   Procedures     PSA tumor marker [[id number]]       Video-Visit Details    Type of service:  Video Visit    Video End Time:9:53 AM    Originating Location (pt. Location): Home    Distant Location (provider location):  On-site    Platform used for Video Visit: [employer]    7 minutes spent on the date of the encounter including direct interaction with the patient, performing chart review, history and exam, documentation and further activities as noted above.    Spencer Kinsey MD  Urology  [facility]        Again, thank you for allowing me to participate in the care of your patient.      Sincerely,    Spencer Kinsey MD

## 2024-02-08 NOTE — PROGRESS NOTES
Krista Antonio is a 78 year old male who is being evaluated via a billable video visit.      How would you like to obtain your AVS? MyChart  If the video visit is dropped, the invitation should be resent by: Text to cell phone: [phone]  Will anyone else be joining your video visit? No    Video Start Time: 9:46 AM    CHIEF COMPLAINT   It was my pleasure to see Krista Antonio who is a 78 year old male for follow-up of Prostate Cancer.      HPI  Krista Antonio is a very pleasant 78 year old male who presents with a history of prostate cancer. He is s/p RALP completed 12/19/2019.  PSA calli to 0.61. Salvage radiotherapy completed 4/2021. Tolerated radiation well, but did note some fatigue and a little worsening of his continence.     Noted ongoing improvement with continence over the first year after surgery but still with some leakage. Very active with pushups, Judo, etc. And has some ongoing bothersome incontinence with activity. Stamina improving. Looked into a sling with Dr. Montgomery but decided not to have any additional surgery. Currently undergoing workup for possible myasthenia gravis.     Testosterone 230     PSA  2/1/2024 - <0.01  8/3/2023 - <0.01  2/3/2023 - <0.01  8/5/2022 -  0.01  2/7/2022 - <0.01  7/30/2021 - <0.01  4/30/2021 - <0.01  12/29/2020 - 0.61  9/24/2020 - 0.18  6/22/2020 - 0.05  3/16/2020 - 0.01     RALP 12/19/2019   Tumor        Histologic Type:   Adenocarcinoma, mixed: small acinar and ductal   (Ductal: Between 15 and 20% of tumor   area).             Primary Pattern:   4 (60%)             Secondary Pattern:   3 (40%).             Total [place] Score:   7 (Grade group 3).        Extraprostatic Extension:   Present: Focal: Left posterior inferior.        Seminal Vesicle Invasion:   Not identified     Margins:   Margins uninvolved by invasive carcinoma      Pathologic Staging (pTNM)        Primary Tumor (pT):    pT3a:  Extraprostatic extension.        Regional Lymph Nodes (pN):   pN0              Number of Lymph Nodes Examined:   11             Number of Lymph Nodes Involved:      0        Distant Metastasis (pM):    pM N/A.          Allergies:   Atorvastatin, Simvastatin, and Sulfa antibiotics         Review of Systems:  From intake questionnaire     Skin: negative  Eyes: negative  Ears/Nose/Throat: negative  Respiratory: No shortness of breath, dyspnea on exertion, cough, or hemoptysis  Cardiovascular: No chest pain or palpitations  Gastrointestinal: negative; no nausea/vomiting, constipation or diarrhea  Genitourinary: as per HPI  Musculoskeletal: negative  Neurologic: negative  Psychiatric: negative  Hematologic/Lymphatic/Immunologic: negative  Endocrine: negative         Physical Exam:     Vitals:  No vitals were obtained today due to virtual visit.    Physical Exam   EYES: Eyes grossly normal to inspection.  No discharge or erythema, or obvious scleral/conjunctival abnormalities.  SKIN: Visible skin clear. No significant rash, abnormal pigmentation or lesions.  NEURO: Cranial nerves grossly intact.  Mentation and speech appropriate for age.  GENERAL: Healthy, alert and no distress  RESP: No audible wheeze, cough, or visible cyanosis.  No visible retractions or increased work of breathing.    PSYCH: Mentation appears normal, affect normal/bright, judgement and insight intact, normal speech and appearance well-groomed.      Outside and Past Medical records:    Review of the result(s) of each unique test - PSA          Assessment and Plan:      78 year old male with pT3a prostate cancer, Jimbo 3+4, s/p RALP 12/19/2019. Negative margins. PSA had risen to 0.61. He has now completed salvage radiotherapy as of 4/2021 and tolerated this well. Did Lupron with the radiation. PSA is again undetectable.      Did have slight worsening of continence toward the end of radiotherapy. He saw Dr. Montgomery but decided to forego additional surgery.     - Follow-up with me in 6 months with PSA    Orders  Orders  Placed This Encounter   Procedures    PSA tumor marker [[id number]]       Video-Visit Details    Type of service:  Video Visit    Video End Time:9:53 AM    Originating Location (pt. Location): Home    Distant Location (provider location):  On-site    Platform [facility] for Video Visit: [facility]    7 minutes spent on the date of the encounter including direct interaction with the patient, performing chart review, history and exam, documentation and further activities as noted above.    Spencer Kinsey MD  Urology  [facility]

## 2024-05-26 ENCOUNTER — HEALTH MAINTENANCE LETTER (OUTPATIENT)
Age: 79
End: 2024-05-26

## 2024-08-04 ENCOUNTER — HEALTH MAINTENANCE LETTER (OUTPATIENT)
Age: 79
End: 2024-08-04

## 2024-08-28 ENCOUNTER — LAB (OUTPATIENT)
Dept: LAB | Facility: CLINIC | Age: 79
End: 2024-08-28
Payer: COMMERCIAL

## 2024-08-28 DIAGNOSIS — C61 PROSTATE CANCER (H): ICD-10-CM

## 2024-08-28 LAB — PSA SERPL DL<=0.01 NG/ML-MCNC: <0.01 NG/ML (ref 0–6.5)

## 2024-08-28 PROCEDURE — 36415 COLL VENOUS BLD VENIPUNCTURE: CPT

## 2024-08-28 PROCEDURE — 84153 ASSAY OF PSA TOTAL: CPT

## 2024-09-05 ENCOUNTER — VIRTUAL VISIT (OUTPATIENT)
Dept: UROLOGY | Facility: CLINIC | Age: 79
End: 2024-09-05
Payer: COMMERCIAL

## 2024-09-05 DIAGNOSIS — C61 PROSTATE CANCER (H): Primary | ICD-10-CM

## 2024-09-05 PROCEDURE — 99213 OFFICE O/P EST LOW 20 MIN: CPT | Mod: 95 | Performed by: UROLOGY

## 2024-09-05 ASSESSMENT — PAIN SCALES - GENERAL: PAINLEVEL: NO PAIN (0)

## 2024-09-05 NOTE — Clinical Note
9/5/2024       RE: Krista Antonio  [id number] [address] [address]     Dear Colleague,    Thank you for referring your patient, Krista Antonio, to the [facility] at [facility]. Please see a copy of my visit note below.    Virtual Visit Details    Type of service:  Video Visit   Video Start Time: {video visit start/end time for provider to select:214151}  Video End Time:{video visit start/end time for provider to select:563893}    Originating Location (pt. Location): Home  {PROVIDER LOCATION On-site should be selected for visits conducted from your clinic location or adjoining [facility] hospital, academic office, or other nearby [facility] building. Off-site should be selected for all other provider locations, including home:[address]}  Distant Location (provider location):  On-site  Platform used for Video Visit: Jennifer Antonio is a 79 year old male who is being evaluated via a billable video visit.      How would you like to obtain your AVS? MyChart  If the video visit is dropped, the invitation should be resent by: Text to cell phone: [phone]  Will anyone else be joining your video visit? No    Video Start Time: 9:33 AM    CHIEF COMPLAINT   It was my pleasure to see Krista Antonio who is a 79 year old male for follow-up of Prostate Cancer.      HPI  Krista Antonio is a very pleasant 79 year old male who presents with a history of prostate cancer. He is s/p RALP completed 12/19/2019.  PSA calli to 0.61. Salvage radiotherapy completed 4/2021. Tolerated radiation well, but did note some fatigue and a little worsening of his continence.     Noted ongoing improvement with continence over the first year after surgery but still with some leakage. Very active with pushups, Judo, etc. And has some ongoing bothersome incontinence with activity. Stamina improving. Looked into a sling with Dr. Montgomery but decided not to have any  "additional surgery. Currently undergoing workup for possible myasthenia gravis.    Recent diagnosis of myasthenia gravis.      Testosterone 230     PSA  8/28/2024 - <0.01  2/1/2024 - <0.01  8/3/2023 - <0.01  2/3/2023 - <0.01  8/5/2022 -  0.01  2/7/2022 - <0.01  7/30/2021 - <0.01  4/30/2021 - <0.01  12/29/2020 - 0.61  9/24/2020 - 0.18  6/22/2020 - 0.05  3/16/2020 - 0.01     RALP 12/19/2019   Tumor        Histologic Type:   Adenocarcinoma, mixed: small acinar and ductal   (Ductal: Between 15 and 20% of tumor   area).             Primary Pattern:   4 (60%)             Secondary Pattern:   3 (40%).             Total Jimbo Score:   7 (Grade group 3).        Extraprostatic Extension:   Present: Focal: Left posterior inferior.        Seminal Vesicle Invasion:   Not identified     Margins:   Margins uninvolved by invasive carcinoma      Pathologic Staging (pTNM)        Primary Tumor (pT):    pT3a:  Extraprostatic extension.        Regional Lymph Nodes (pN):   pN0             Number of Lymph Nodes Examined:   11             Number of Lymph Nodes Involved:      0        Distant Metastasis (pM):    pM N/A.          Allergies:   Atorvastatin, Simvastatin, and Sulfa antibiotics         Review of Systems:  From intake questionnaire     Skin: negative  Eyes: negative  Ears/Nose/Throat: negative  Respiratory: No shortness of breath, dyspnea on exertion, cough, or hemoptysis  Cardiovascular: No chest pain or palpitations  Gastrointestinal: negative; no nausea/vomiting, constipation or diarrhea  Genitourinary: as per HPI  Musculoskeletal: negative  Neurologic: negative  Psychiatric: negative  Hematologic/Lymphatic/Immunologic: negative  Endocrine: negative         Physical Exam:     Vitals:  No vitals were obtained today due to virtual visit.    Physical Exam   {video visit exam brief selected:044508::\"GENERAL: Healthy, alert and no distress\",\"EYES: Eyes grossly normal to inspection.  No discharge or erythema, or obvious " "scleral/conjunctival abnormalities.\",\"RESP: No audible wheeze, cough, or visible cyanosis.  No visible retractions or increased work of breathing.  \",\"SKIN: Visible skin clear. No significant rash, abnormal pigmentation or lesions.\",\"NEURO: Cranial nerves grossly intact.  Mentation and speech appropriate for age.\",\"PSYCH: Mentation appears normal, affect normal/bright, judgement and insight intact, normal speech and appearance well-groomed.\"}      Outside and Past Medical records:    Review of the result(s) of each unique test - PSA         Assessment and Plan:     79 year old male with pT3a prostate cancer, [place] 3+4, s/p RALP 12/19/2019. Negative margins. PSA had risen to 0.61. He has now completed salvage radiotherapy as of 4/2021 and tolerated this well. Did Lupron with the radiation. PSA is again undetectable.      Did have slight worsening of continence toward the end of radiotherapy. He saw Dr. Montgomery but decided to forego additional surgery.     - Follow-up with me in 12 months with PSA    Orders  No orders of the defined types were placed in this encounter.        Video-Visit Details    Type of service:  Video Visit    Video End Time:{video visit start/end time for provider to select:154707}    Originating Location (pt. Location): {video visit patient location:902997::\"Home\"}    Distant Location (provider location):  {virtual location provider:899929}    Platform used for Video Visit: {Virtual Visit Platforms:877915::\"[employer]\"}    *** minutes spent on the date of the encounter including direct interaction with the patient, performing chart review, history and exam, documentation and further activities as noted above.    Spencer Kinsey MD  Urology  [facility]        Again, thank you for allowing me to participate in the care of your patient.      Sincerely,    Spencer Kinsey MD    "

## 2024-09-05 NOTE — NURSING NOTE
Is the patient currently in the state of [place]? YES    Visit mode:VIDEO    If the visit is dropped, the patient can be reconnected by: VIDEO VISIT: Send to e-mail at: [email or web address]    Will anyone else be joining the visit? NO  (If patient encounters technical issues they should call [phone] :[phone])    How would you like to obtain your AVS? MyChart    Are changes needed to the allergy or medication list? No    Are refills needed on medications prescribed by this physician? NO    Reason for visit: Follow Up    Sonia PELAEZ

## 2024-09-05 NOTE — PROGRESS NOTES
Kritsa Antonio is a 79 year old male who is being evaluated via a billable video visit.      How would you like to obtain your AVS? MyChart  If the video visit is dropped, the invitation should be resent by: Text to cell phone: [phone]  Will anyone else be joining your video visit? No    Video Start Time: 9:33 AM    CHIEF COMPLAINT   It was my pleasure to see Krista Antonio who is a 79 year old male for follow-up of Prostate Cancer.      HPI  Krista Antonio is a very pleasant 79 year old male who presents with a history of prostate cancer. He is s/p RALP completed 12/19/2019.  PSA calli to 0.61. Salvage radiotherapy completed 4/2021. Tolerated radiation well.     Noted ongoing improvement with continence over the first year after surgery but still with some leakage. Very active with pushups, Judo, etc. And has some ongoing bothersome incontinence with activity. Stamina improving. Looked into a sling with Dr. Montgomery but decided not to have any additional surgery.     Recent diagnosis of myasthenia gravis. Muscles now working better with treatment.     Testosterone 230     PSA  8/28/2024 - <0.01  2/1/2024 - <0.01  8/3/2023 - <0.01  2/3/2023 - <0.01  8/5/2022 -  0.01  2/7/2022 - <0.01  7/30/2021 - <0.01  4/30/2021 - <0.01  12/29/2020 - 0.61  9/24/2020 - 0.18  6/22/2020 - 0.05  3/16/2020 - 0.01     RALP 12/19/2019   Tumor        Histologic Type:   Adenocarcinoma, mixed: small acinar and ductal   (Ductal: Between 15 and 20% of tumor   area).             Primary Pattern:   4 (60%)             Secondary Pattern:   3 (40%).             Total [place] Score:   7 (Grade group 3).        Extraprostatic Extension:   Present: Focal: Left posterior inferior.        Seminal Vesicle Invasion:   Not identified     Margins:   Margins uninvolved by invasive carcinoma      Pathologic Staging (pTNM)        Primary Tumor (pT):    pT3a:  Extraprostatic extension.        Regional Lymph Nodes (pN):   pN0             Number of  Lymph Nodes Examined:   11             Number of Lymph Nodes Involved:      0        Distant Metastasis (pM):    pM N/A.          Allergies:   Atorvastatin, Simvastatin, and Sulfa antibiotics         Review of Systems:  From intake questionnaire     Skin: negative  Eyes: negative  Ears/Nose/Throat: negative  Respiratory: No shortness of breath, dyspnea on exertion, cough, or hemoptysis  Cardiovascular: No chest pain or palpitations  Gastrointestinal: negative; no nausea/vomiting, constipation or diarrhea  Genitourinary: as per HPI  Musculoskeletal: negative  Neurologic: negative  Psychiatric: negative  Hematologic/Lymphatic/Immunologic: negative  Endocrine: negative         Physical Exam:     Vitals:  No vitals were obtained today due to virtual visit.    Physical Exam   EYES: Eyes grossly normal to inspection.  No discharge or erythema, or obvious scleral/conjunctival abnormalities.  SKIN: Visible skin clear. No significant rash, abnormal pigmentation or lesions.  NEURO: Cranial nerves grossly intact.  Mentation and speech appropriate for age.  GENERAL: Healthy, alert and no distress  RESP: No audible wheeze, cough, or visible cyanosis.  No visible retractions or increased work of breathing.    PSYCH: Mentation appears normal, affect normal/bright, judgement and insight intact, normal speech and appearance well-groomed.      Outside and Past Medical records:    Review of the result(s) of each unique test - PSA         Assessment and Plan:     79 year old male with pT3a prostate cancer, [place] 3+4, s/p RALP 12/19/2019. Negative margins. PSA had risen to 0.61. He has now completed salvage radiotherapy as of 4/2021 and tolerated this well. Did Lupron with the radiation. PSA is again undetectable.      Did have slight worsening of continence toward the end of radiotherapy. He saw Dr. Montgomery but decided to forego additional surgery. Will re-engage with Nori now that he is being treated for myasthenia gravis.     -  Follow-up with me in 12 months with PSA    Orders  Orders Placed This Encounter   Procedures    PSA tumor marker [[id number]]     Video-Visit Details    Type of service:  Video Visit    Video End Time:9:40 AM    Originating Location (pt. Location): Home    Distant Location (provider location):  On-site    Platform used for Video Visit: [facility] [facility]    7 minutes spent on the date of the encounter including direct interaction with the patient, performing chart review, history and exam, documentation and further activities as noted above.    Spencer Kinsey MD  Urology  [facility]

## 2024-10-13 ENCOUNTER — HEALTH MAINTENANCE LETTER (OUTPATIENT)
Age: 79
End: 2024-10-13

## 2025-01-25 ENCOUNTER — HEALTH MAINTENANCE LETTER (OUTPATIENT)
Age: 80
End: 2025-01-25

## 2025-05-03 ENCOUNTER — HEALTH MAINTENANCE LETTER (OUTPATIENT)
Age: 80
End: 2025-05-03

## 2025-05-24 ENCOUNTER — HEALTH MAINTENANCE LETTER (OUTPATIENT)
Age: 80
End: 2025-05-24

## 2025-08-16 ENCOUNTER — HEALTH MAINTENANCE LETTER (OUTPATIENT)
Age: 80
End: 2025-08-16

## 2025-09-04 ENCOUNTER — LAB (OUTPATIENT)
Dept: LAB | Facility: CLINIC | Age: 80
End: 2025-09-04
Payer: COMMERCIAL

## 2025-09-04 DIAGNOSIS — C61 PROSTATE CANCER (H): ICD-10-CM

## 2025-09-04 LAB — PSA SERPL DL<=0.01 NG/ML-MCNC: <0.01 NG/ML (ref 0–6.5)

## 2025-09-04 PROCEDURE — 36415 COLL VENOUS BLD VENIPUNCTURE: CPT

## 2025-09-04 PROCEDURE — 84153 ASSAY OF PSA TOTAL: CPT

## (undated) DEVICE — SOL NACL 0.9% IRRIG 1000ML BOTTLE 2F7124

## (undated) DEVICE — BLADE KNIFE SURG 11 371111

## (undated) DEVICE — ESU CORD MONOPOLAR 10'  E0510

## (undated) DEVICE — SU SILK 2-0 PSL 18" 673H

## (undated) DEVICE — GOWN LG DISP 9515

## (undated) DEVICE — SOL NACL 0.9% INJ 250ML BAG 2B1322Q

## (undated) DEVICE — SUCTION IRR STRYKERFLOW II W/TIP 250-070-520

## (undated) DEVICE — SUCTION CANISTER MEDIVAC LINER 3000ML W/LID 65651-530

## (undated) DEVICE — SU VICRYL 4-0 PS-2 18" UND J496H

## (undated) DEVICE — SYR 30ML LL W/O NDL 302832

## (undated) DEVICE — ESU GROUND PAD ADULT W/CORD E7507

## (undated) DEVICE — SOL WATER IRRIG 1000ML BOTTLE 07139-09

## (undated) DEVICE — SU MONOCRYL 4-0 PS-2 27" UND Y426H

## (undated) DEVICE — GLOVE PROTEXIS BLUE W/NEU-THERA 8.0  2D73EB80

## (undated) DEVICE — SU VICRYL 3-0 SH 27" J316H

## (undated) DEVICE — SOL NACL 0.9% INJ 1000ML BAG 2B1324X

## (undated) DEVICE — CLIP APPLIER ENDO 05MM MED/LG 176630

## (undated) DEVICE — TUBING SUCTION 12"X1/4" N612

## (undated) DEVICE — TAPE CLOTH ADHESIVE 3" LATEX FREE

## (undated) DEVICE — SUCTION MANIFOLD NEPTUNE 2 SYS 4 PORT 0702-020-000

## (undated) DEVICE — ENDO TROCAR 05MM VERSAONE BLADELESS W/STD FIX CAN NONB5STF

## (undated) DEVICE — COVER FOOTSWITCH W/CINCH 20X24" 923267

## (undated) DEVICE — ENDO CANNULA 05MM VERSAONE UNIVERSAL UNVCA5STF

## (undated) DEVICE — Device

## (undated) DEVICE — DRAIN JACKSON PRATT RESERVOIR 100ML SU130-1305

## (undated) DEVICE — GLOVE PROTEXIS BLUE W/NEU-THERA 7.5  2D73EB75

## (undated) DEVICE — PACK DAVINCI UROLOGY SBA15UDFSG

## (undated) DEVICE — COVER FOOTSWITCH URO

## (undated) DEVICE — LINEN ORTHO ACL PACK 5447

## (undated) DEVICE — DAVINCI DRAPE KIT 4-ARM 420291

## (undated) DEVICE — APPLICATOR COTTON TIP 6"X2 STERILE LF 6012

## (undated) DEVICE — SUCTION CANISTER STRAW 65652-008

## (undated) DEVICE — ENDO POUCH GOLD 10MM ECATCH 173050G

## (undated) DEVICE — CLIP ENDO HEMO-LOC PURPLE LG 544240

## (undated) DEVICE — SU WND CLOSURE V-LOC 3-0 CV-23 6" VLOCM1904

## (undated) DEVICE — BAG CLEAR TRASH 1.3M 39X33" P4040C

## (undated) DEVICE — ENDO TROCAR FIRST ENTRY KII FIOS Z-THRD 12X100MM CTF73

## (undated) DEVICE — GLOVE PROTEXIS W/NEU-THERA 7.5  2D73TE75

## (undated) DEVICE — ESU ELEC BLADE 2.75" COATED/INSULATED E1455

## (undated) DEVICE — TUBING CONMED AIRSEAL SMOKE EVAC INSUFFLATION ASM-EVAC

## (undated) DEVICE — NDL INSUFFLATION 13GA 120MM C2201

## (undated) DEVICE — SU VICRYL 0 CT-1 27" J340H

## (undated) DEVICE — LINEN TOWEL PACK X5 5464

## (undated) DEVICE — DRAIN BLAKE 19FR W/TROCAR SIL

## (undated) DEVICE — LINEN HALF SHEET 5512

## (undated) DEVICE — SU WND CLOSURE VLOC 90 ABS 3-0 VIOLET 6" CV-23 VLOCM0804

## (undated) DEVICE — WIRE GUIDE 0.025"X450CM JAGWIRE HP STR TIP 5656

## (undated) DEVICE — GLOVE PROTEXIS BLUE W/NEU-THERA 6.5  2D73EB65

## (undated) DEVICE — SPONGE RAY-TEC 4X8" 7318

## (undated) DEVICE — SU VICRYL 0 UR-6 27" J603H

## (undated) DEVICE — ADH SKIN CLOSURE PREMIERPRO EXOFIN 1.0ML 3470

## (undated) DEVICE — LINEN POUCH DBL 5427

## (undated) DEVICE — CATH FOLEY 20FR 5ML LUBRICATH LATEX 0165L20

## (undated) DEVICE — BLADE CLIPPER 3M 9670

## (undated) DEVICE — BALLOON EXTRACTION 3-LUMEN 15X190MM 2.8MM TL B-V233P-A

## (undated) DEVICE — GOWN XLG DISP 9545

## (undated) DEVICE — ESU PENCIL W/HOLSTER E2350H

## (undated) DEVICE — PACK ERCP CUSTOM RIDGES

## (undated) DEVICE — RAD RX ISOVUE 300 (50ML) 61% IOPAMIDOL CHARGE PER ML

## (undated) DEVICE — DECANTER BAG 2002S

## (undated) DEVICE — ENDO TROCAR CONMED AIRSEAL BLADELESS 12X120MM IAS12-120LP

## (undated) DEVICE — KIT PATIENT POSITIONING PIGAZZI LATEX FREE 40580

## (undated) DEVICE — SU VICRYL 4-0 RB-1 27" J304

## (undated) DEVICE — SYR 05ML LL W/O NDL

## (undated) DEVICE — SOL WATER IRRIG 500ML BOTTLE 2F7113

## (undated) DEVICE — DRSG GAUZE 4X4" 8044

## (undated) DEVICE — LINEN FULL SHEET 5511

## (undated) DEVICE — SOL WATER IRRIG 1000ML BOTTLE 2F7114

## (undated) DEVICE — DECANTER VIAL 2006S

## (undated) DEVICE — PREP PAD ALCOHOL 6818

## (undated) DEVICE — PROTECTOR ARM ONE-STEP TRENDELENBURG 40418

## (undated) DEVICE — DAVINCI HOT SHEARS TIP COVER  400180

## (undated) DEVICE — ESU CORD BIPOLAR GREEN 10-4000

## (undated) DEVICE — PREP CHLORAPREP 26ML TINTED ORANGE  260815

## (undated) DEVICE — ENDO POUCH UNIV RETRIEVAL SYSTEM INZII 10MM CD001

## (undated) RX ORDER — NEOSTIGMINE METHYLSULFATE 5 MG/5 ML
SYRINGE (ML) INTRAVENOUS
Status: DISPENSED
Start: 2017-02-09

## (undated) RX ORDER — GLYCOPYRROLATE 0.2 MG/ML
INJECTION INTRAMUSCULAR; INTRAVENOUS
Status: DISPENSED
Start: 2017-02-09

## (undated) RX ORDER — BUPIVACAINE HYDROCHLORIDE AND EPINEPHRINE 5; 5 MG/ML; UG/ML
INJECTION, SOLUTION EPIDURAL; INTRACAUDAL; PERINEURAL
Status: DISPENSED
Start: 2017-02-10

## (undated) RX ORDER — PROPOFOL 10 MG/ML
INJECTION, EMULSION INTRAVENOUS
Status: DISPENSED
Start: 2019-12-19

## (undated) RX ORDER — NEOSTIGMINE METHYLSULFATE 5 MG/5 ML
SYRINGE (ML) INTRAVENOUS
Status: DISPENSED
Start: 2017-02-10

## (undated) RX ORDER — NEOSTIGMINE METHYLSULFATE 1 MG/ML
VIAL (ML) INJECTION
Status: DISPENSED
Start: 2019-12-19

## (undated) RX ORDER — ONDANSETRON 2 MG/ML
INJECTION INTRAMUSCULAR; INTRAVENOUS
Status: DISPENSED
Start: 2017-02-10

## (undated) RX ORDER — PHENYLEPHRINE HCL IN 0.9% NACL 1 MG/10 ML
SYRINGE (ML) INTRAVENOUS
Status: DISPENSED
Start: 2019-12-19

## (undated) RX ORDER — EPHEDRINE SULFATE 50 MG/ML
INJECTION, SOLUTION INTRAMUSCULAR; INTRAVENOUS; SUBCUTANEOUS
Status: DISPENSED
Start: 2019-12-19

## (undated) RX ORDER — LIDOCAINE HYDROCHLORIDE 10 MG/ML
INJECTION, SOLUTION EPIDURAL; INFILTRATION; INTRACAUDAL; PERINEURAL
Status: DISPENSED
Start: 2019-12-19

## (undated) RX ORDER — DEXAMETHASONE SODIUM PHOSPHATE 4 MG/ML
INJECTION, SOLUTION INTRA-ARTICULAR; INTRALESIONAL; INTRAMUSCULAR; INTRAVENOUS; SOFT TISSUE
Status: DISPENSED
Start: 2019-12-19

## (undated) RX ORDER — GABAPENTIN 300 MG/1
CAPSULE ORAL
Status: DISPENSED
Start: 2019-12-19

## (undated) RX ORDER — GLYCOPYRROLATE 0.2 MG/ML
INJECTION INTRAMUSCULAR; INTRAVENOUS
Status: DISPENSED
Start: 2019-12-19

## (undated) RX ORDER — LABETALOL HYDROCHLORIDE 5 MG/ML
INJECTION, SOLUTION INTRAVENOUS
Status: DISPENSED
Start: 2017-02-10

## (undated) RX ORDER — HYDRALAZINE HYDROCHLORIDE 20 MG/ML
INJECTION INTRAMUSCULAR; INTRAVENOUS
Status: DISPENSED
Start: 2017-02-10

## (undated) RX ORDER — PROPOFOL 10 MG/ML
INJECTION, EMULSION INTRAVENOUS
Status: DISPENSED
Start: 2017-02-10

## (undated) RX ORDER — CEFAZOLIN SODIUM 1 G/3ML
INJECTION, POWDER, FOR SOLUTION INTRAMUSCULAR; INTRAVENOUS
Status: DISPENSED
Start: 2019-12-19

## (undated) RX ORDER — BUPIVACAINE HYDROCHLORIDE 2.5 MG/ML
INJECTION, SOLUTION EPIDURAL; INFILTRATION; INTRACAUDAL
Status: DISPENSED
Start: 2019-12-19

## (undated) RX ORDER — ACETAMINOPHEN 325 MG/1
TABLET ORAL
Status: DISPENSED
Start: 2019-12-19

## (undated) RX ORDER — LIDOCAINE HYDROCHLORIDE 10 MG/ML
INJECTION, SOLUTION EPIDURAL; INFILTRATION; INTRACAUDAL; PERINEURAL
Status: DISPENSED
Start: 2017-02-10

## (undated) RX ORDER — GLYCOPYRROLATE 0.2 MG/ML
INJECTION INTRAMUSCULAR; INTRAVENOUS
Status: DISPENSED
Start: 2017-02-10

## (undated) RX ORDER — FENTANYL CITRATE 50 UG/ML
INJECTION, SOLUTION INTRAMUSCULAR; INTRAVENOUS
Status: DISPENSED
Start: 2017-02-09

## (undated) RX ORDER — FENTANYL CITRATE 50 UG/ML
INJECTION, SOLUTION INTRAMUSCULAR; INTRAVENOUS
Status: DISPENSED
Start: 2019-12-19

## (undated) RX ORDER — DEXAMETHASONE SODIUM PHOSPHATE 4 MG/ML
INJECTION, SOLUTION INTRA-ARTICULAR; INTRALESIONAL; INTRAMUSCULAR; INTRAVENOUS; SOFT TISSUE
Status: DISPENSED
Start: 2017-02-09

## (undated) RX ORDER — ONDANSETRON 2 MG/ML
INJECTION INTRAMUSCULAR; INTRAVENOUS
Status: DISPENSED
Start: 2017-02-09

## (undated) RX ORDER — ONDANSETRON 2 MG/ML
INJECTION INTRAMUSCULAR; INTRAVENOUS
Status: DISPENSED
Start: 2019-12-19

## (undated) RX ORDER — PROPOFOL 10 MG/ML
INJECTION, EMULSION INTRAVENOUS
Status: DISPENSED
Start: 2017-02-09

## (undated) RX ORDER — CEFAZOLIN SODIUM 2 G/100ML
INJECTION, SOLUTION INTRAVENOUS
Status: DISPENSED
Start: 2019-12-19

## (undated) RX ORDER — HEPARIN SODIUM 5000 [USP'U]/.5ML
INJECTION, SOLUTION INTRAVENOUS; SUBCUTANEOUS
Status: DISPENSED
Start: 2019-12-19

## (undated) RX ORDER — LIDOCAINE HYDROCHLORIDE 20 MG/ML
JELLY TOPICAL
Status: DISPENSED
Start: 2023-02-21

## (undated) RX ORDER — KETOROLAC TROMETHAMINE 30 MG/ML
INJECTION, SOLUTION INTRAMUSCULAR; INTRAVENOUS
Status: DISPENSED
Start: 2019-12-19

## (undated) RX ORDER — LIDOCAINE HYDROCHLORIDE 10 MG/ML
INJECTION, SOLUTION EPIDURAL; INFILTRATION; INTRACAUDAL; PERINEURAL
Status: DISPENSED
Start: 2017-02-09